# Patient Record
Sex: FEMALE | Race: WHITE | NOT HISPANIC OR LATINO | Employment: FULL TIME | URBAN - METROPOLITAN AREA
[De-identification: names, ages, dates, MRNs, and addresses within clinical notes are randomized per-mention and may not be internally consistent; named-entity substitution may affect disease eponyms.]

---

## 2017-05-07 NOTE — PROGRESS NOTES
SUBJECTIVE:                                                    Joan Lam is a 52 year old female who presents to clinic today for the following health issues:      Depression and Anxiety Follow-Up    Status since last visit: Improved managing better (lifestyle)    Other associated symptoms: None    Complicating factors:     Significant life event: No     Current substance abuse: None    Using trazodone rarely.  Using lorazepam sparingly (Has used about #60 in past 5 months)    PHQ-9 SCORE 6/23/2016 10/17/2016 5/8/2017   Total Score - - -   Total Score 1 2 0     NATALIE-7 SCORE 6/23/2016 10/17/2016 5/8/2017   Total Score - - -   Total Score 0 6 2        PHQ-9  English      PHQ-9   Any Language     GAD7       Amount of exercise or physical activity: 4-5 days/week for an average of 30-45 minutes    Problems taking medications regularly: No    Medication side effects: none    Diet: regular (no restrictions)    Hypertension Follow-up      Outpatient blood pressures are not being checked.    Has gained some weight and not eating well over the winter.    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        Problem list and histories reviewed & adjusted, as indicated.  Additional history: as documented    Patient Active Problem List   Diagnosis     Allergic rhinitis     Mild recurrent major depression (H)     Eczema of eyelid     Anxiety disorder     Essential hypertension with goal blood pressure less than 140/90     Past Surgical History:   Procedure Laterality Date     BUNIONECTOMY RT/LT  2005    Right Foot     BUNIONECTOMY RT/LT  2006    Left Foot        Social History   Substance Use Topics     Smoking status: Never Smoker     Smokeless tobacco: Not on file     Alcohol use Yes      Comment: occ     Family History   Problem Relation Age of Onset     Hypertension Father      Breast Cancer Mother      CEREBROVASCULAR DISEASE Maternal  "Grandmother      Breast Cancer Maternal Grandmother          BP Readings from Last 3 Encounters:   05/08/17 (!) 146/99   12/15/16 126/82   10/17/16 (!) 128/94    Wt Readings from Last 3 Encounters:   05/08/17 200 lb 4 oz (90.8 kg)   12/15/16 199 lb (90.3 kg)   10/17/16 198 lb (89.8 kg)           Reviewed and updated as needed this visit by clinical staff  Tobacco  Allergies  Meds  Problems  Med Hx  Surg Hx  Fam Hx  Soc Hx        Reviewed and updated as needed this visit by Provider  Allergies  Meds  Problems         ROS:  CONST: NEGATIVE for problems with sleep and POSITIVE for weight gain  PSYCH: NEGATIVE for changes in mood    OBJECTIVE:                                                    BP (!) 146/99 (BP Location: Left arm, Patient Position: Chair, Cuff Size: Adult Large)  Pulse 90  Temp 98  F (36.7  C) (Oral)  Ht 5' 4.5\" (1.638 m)  Wt 200 lb 4 oz (90.8 kg)  LMP 04/10/2017  SpO2 98%  Breastfeeding? No  BMI 33.84 kg/m2  Body mass index is 33.84 kg/(m^2).  GEN:  no apparent distress  PSYCH:    Appearance: appropriately and casually dressed    Attitude: cooperative    Speech:  normal rate, rhythm, and tone    Thought Form: organized and logical    Thought Content: no evidence of psychotic thought    Mood: good    Affect: intensity is flat    Insight: good          ASSESSMENT/PLAN:                                                            1. Recurrent major depressive disorder, in full remission (H)  stable/well controlled   - FLUoxetine (PROZAC) 20 MG capsule; Take 2 capsules (40 mg) by mouth daily  Dispense: 180 capsule; Refill: 1    2. NATALIE (generalized anxiety disorder)  stable/well controlled.  Reviewed that lorazepam is a controlled substances and should be used sparingly as she is.    - LORazepam (ATIVAN) 0.5 MG tablet; Take 1 tablet (0.5 mg) by mouth daily as needed  Dispense: 60 tablet; Refill: 0  - FLUoxetine (PROZAC) 20 MG capsule; Take 2 capsules (40 mg) by mouth daily  Dispense: 180 " capsule; Refill: 1    3. Eczema of eyelid, unspecified laterality  Mild and related to seasonal allergy seasons  - desonide (DESOWEN) 0.05 % ointment; Apply topically 2 times daily as needed up to one week at a time.  Dispense: 15 g; Refill: 2    4. Essential hypertension with goal blood pressure less than 140/90  Running high today.  She wants to keep working on diet/exercise and will self-monitor blood pressure.  She will let me know if it persists > 140/90.  We did discuss sequelae/risks of untreated hypertension.      5. Screen for colon cancer  - GASTROENTEROLOGY ADULT REF PROCEDURE ONLY    FUTURE APPOINTMENTS:       - Follow-up visit in 6 months.    Doreen Aguila MD  [facility]

## 2017-05-08 ENCOUNTER — OFFICE VISIT (OUTPATIENT)
Dept: FAMILY MEDICINE | Facility: CLINIC | Age: 52
End: 2017-05-08
Payer: COMMERCIAL

## 2017-05-08 VITALS
WEIGHT: 200.25 LBS | TEMPERATURE: 98 F | DIASTOLIC BLOOD PRESSURE: 99 MMHG | BODY MASS INDEX: 33.36 KG/M2 | HEIGHT: 65 IN | SYSTOLIC BLOOD PRESSURE: 146 MMHG | OXYGEN SATURATION: 98 % | HEART RATE: 90 BPM

## 2017-05-08 DIAGNOSIS — H01.139 ECZEMA OF EYELID, UNSPECIFIED LATERALITY: ICD-10-CM

## 2017-05-08 DIAGNOSIS — F41.1 GAD (GENERALIZED ANXIETY DISORDER): ICD-10-CM

## 2017-05-08 DIAGNOSIS — F33.42 RECURRENT MAJOR DEPRESSIVE DISORDER, IN FULL REMISSION (H): Primary | ICD-10-CM

## 2017-05-08 DIAGNOSIS — Z12.11 SCREEN FOR COLON CANCER: ICD-10-CM

## 2017-05-08 DIAGNOSIS — I10 ESSENTIAL HYPERTENSION WITH GOAL BLOOD PRESSURE LESS THAN 140/90: ICD-10-CM

## 2017-05-08 PROCEDURE — 99214 OFFICE O/P EST MOD 30 MIN: CPT | Performed by: FAMILY MEDICINE

## 2017-05-08 RX ORDER — LORAZEPAM 0.5 MG/1
0.5 TABLET ORAL DAILY PRN
Qty: 60 TABLET | Refills: 0 | Status: SHIPPED | OUTPATIENT
Start: 2017-05-08 | End: 2017-08-03

## 2017-05-08 RX ORDER — DESONIDE 0.5 MG/G
OINTMENT TOPICAL 2 TIMES DAILY PRN
Qty: 15 G | Refills: 2 | Status: SHIPPED | OUTPATIENT
Start: 2017-05-08 | End: 2019-09-02

## 2017-05-08 ASSESSMENT — ANXIETY QUESTIONNAIRES
5. BEING SO RESTLESS THAT IT IS HARD TO SIT STILL: NOT AT ALL
1. FEELING NERVOUS, ANXIOUS, OR ON EDGE: NOT AT ALL
GAD7 TOTAL SCORE: 2
7. FEELING AFRAID AS IF SOMETHING AWFUL MIGHT HAPPEN: NOT AT ALL
3. WORRYING TOO MUCH ABOUT DIFFERENT THINGS: SEVERAL DAYS
2. NOT BEING ABLE TO STOP OR CONTROL WORRYING: NOT AT ALL
IF YOU CHECKED OFF ANY PROBLEMS ON THIS QUESTIONNAIRE, HOW DIFFICULT HAVE THESE PROBLEMS MADE IT FOR YOU TO DO YOUR WORK, TAKE CARE OF THINGS AT HOME, OR GET ALONG WITH OTHER PEOPLE: NOT DIFFICULT AT ALL
6. BECOMING EASILY ANNOYED OR IRRITABLE: NOT AT ALL

## 2017-05-08 ASSESSMENT — PATIENT HEALTH QUESTIONNAIRE - PHQ9: 5. POOR APPETITE OR OVEREATING: SEVERAL DAYS

## 2017-05-08 NOTE — NURSING NOTE
"Chief Complaint   Patient presents with     Depression     Anxiety       Initial BP (!) 146/99 (BP Location: Left arm, Patient Position: Chair, Cuff Size: Adult Large)  Pulse 90  Temp 98  F (36.7  C) (Oral)  Ht 5' 4.5\" (1.638 m)  Wt 200 lb 4 oz (90.8 kg)  LMP 04/10/2017  SpO2 98%  Breastfeeding? No  BMI 33.84 kg/m2 Estimated body mass index is 33.84 kg/(m^2) as calculated from the following:    Height as of this encounter: 5' 4.5\" (1.638 m).    Weight as of this encounter: 200 lb 4 oz (90.8 kg).  Medication Reconciliation: complete     Franci Grove MA      "

## 2017-05-08 NOTE — MR AVS SNAPSHOT
After Visit Summary   5/8/2017    Joan Lam    MRN: [id number]           Patient Information     Date Of Birth          1965        Visit Information        Provider Department      5/8/2017 9:00 AM Doreen Aguila MD [facility]        Today's Diagnoses     Screen for colon cancer    -  1    Eczema of eyelid, unspecified laterality        NATALIE (generalized anxiety disorder)        Recurrent major depressive disorder, in full remission (H)           Follow-ups after your visit        Additional Services     GASTROENTEROLOGY ADULT REF PROCEDURE ONLY       Last Lab Result: Creatinine (mg/dL)       Date                     Value                 10/09/2007               0.72             ----------  Body mass index is 33.84 kg/(m^2).      Patient will be contacted to schedule procedure.     Please be aware that coverage of these services is subject to the terms and limitations of your health insurance plan.  Call member services at your health plan with any benefit or coverage questions.  Any procedures must be performed at a [place] [place] facility OR coordinated by your clinic's referral office.    Please bring the following with you to your appointment:    (1) Any X-Rays, CTs or MRIs which have been performed.  Contact the facility where they were done to arrange for  prior to your scheduled appointment.    (2) List of current medications   (3) This referral request   (4) Any documents/labs given to you for this referral                  Follow-up notes from your care team     Return in about 6 months (around 11/8/2017).      Who to contact     If you have questions or need follow up information about today's clinic visit or your schedule please contact [facility] directly at [phone].  Normal or non-critical lab and imaging results will be communicated to you by MyChart, letter or phone within 4 business days after the clinic has received the results. If you  "do not hear from us within 7 days, please contact the clinic through [employer] or phone. If you have a critical or abnormal lab result, we will notify you by phone as soon as possible.  Submit refill requests through [employer] or call your pharmacy and they will forward the refill request to us. Please allow 3 business days for your refill to be completed.          Additional Information About Your Visit        [employer] Information     [employer] gives you secure access to your electronic health record. If you see a primary care provider, you can also send messages to your care team and make appointments. If you have questions, please call your primary care clinic.  If you do not have a primary care provider, please call [phone] and they will assist you.        Care EveryWhere ID     This is your Care EveryWhere ID. This could be used by other organizations to access your [place] medical records  [id number]        Your Vitals Were     Pulse Temperature Height Last Period Pulse Oximetry Breastfeeding?    90 98  F (36.7  C) (Oral) 5' 4.5\" (1.638 m) 04/10/2017 98% No    BMI (Body Mass Index)                   33.84 kg/m2            Blood Pressure from Last 3 Encounters:   05/08/17 (!) 146/92   12/15/16 126/82   10/17/16 (!) 128/94    Weight from Last 3 Encounters:   05/08/17 200 lb 4 oz (90.8 kg)   12/15/16 199 lb (90.3 kg)   10/17/16 198 lb (89.8 kg)              We Performed the Following     GASTROENTEROLOGY ADULT REF PROCEDURE ONLY          Where to get your medicines      These medications were sent to [facility] [address] AT [address] [address]     Phone:  [phone]     desonide 0.05 % ointment    FLUoxetine 20 MG capsule         Some of these will need a paper prescription and others can be bought over the counter.  Ask your nurse if you have questions.     Bring a paper prescription for each of these medications     LORazepam 0.5 MG " tablet          Primary Care Provider Office Phone # Fax #    Doreen Aguila [phone]       [facility] [address] [facility] [address]        Thank you!     Thank you for choosing [facility]  for your care. Our goal is always to provide you with excellent care. Hearing back from our patients is one way we can continue to improve our services. Please take a few minutes to complete the written survey that you may receive in the mail after your visit with us. Thank you!             Your Updated Medication List - Protect others around you: Learn how to safely use, store and throw away your medicines at www.disposemymeds.org.          This list is accurate as of: 5/8/17  9:25 AM.  Always use your most recent med list.                   Brand Name Dispense Instructions for use    cetirizine 10 MG tablet    zyrTEC     Take 10 mg by mouth daily       desonide 0.05 % ointment    DESOWEN    15 g    Apply topically 2 times daily as needed up to one week at a time.       FLUoxetine 20 MG capsule    PROzac    180 capsule    Take 2 capsules (40 mg) by mouth daily       fluticasone 50 MCG/ACT spray    FLONASE     Spray 2 sprays into both nostrils daily       levothyroxine 100 MCG tablet    SYNTHROID/LEVOTHROID     Take 1 tablet by mouth daily       LORazepam 0.5 MG tablet    ATIVAN    60 tablet    Take 1 tablet (0.5 mg) by mouth daily as needed       progesterone 100 MG capsule    PROMETRIUM     Take 300 mg by mouth At Bedtime       traZODone 100 MG tablet    DESYREL     Take 0.5-1 tablets ( mg) by mouth nightly as needed for sleep       VITAMIN D (CHOLECALCIFEROL) PO      Take 1,000 Units by mouth daily Takes 3 tablets in am

## 2017-05-09 ASSESSMENT — ANXIETY QUESTIONNAIRES: GAD7 TOTAL SCORE: 2

## 2017-05-09 ASSESSMENT — PATIENT HEALTH QUESTIONNAIRE - PHQ9: SUM OF ALL RESPONSES TO PHQ QUESTIONS 1-9: 0

## 2017-05-16 ENCOUNTER — TELEPHONE (OUTPATIENT)
Dept: FAMILY MEDICINE | Facility: CLINIC | Age: 52
End: 2017-05-16

## 2017-05-16 DIAGNOSIS — I10 ESSENTIAL HYPERTENSION WITH GOAL BLOOD PRESSURE LESS THAN 140/90: Primary | ICD-10-CM

## 2017-05-16 RX ORDER — AMLODIPINE BESYLATE 5 MG/1
5 TABLET ORAL DAILY
Qty: 30 TABLET | Refills: 1 | Status: SHIPPED | OUTPATIENT
Start: 2017-05-16 | End: 2017-06-12

## 2017-05-16 NOTE — TELEPHONE ENCOUNTER
The patient saw Dr. Aguila 5/8/17 and they discussed the possibility of starting blood pressure medication.  The patient has now decided that she would like to start taking a blood pressure medication, she uses the [facility] on [address].

## 2017-05-16 NOTE — TELEPHONE ENCOUNTER
5/8/17 ov- 4. Essential hypertension with goal blood pressure less than 140/90  Running high today. She wants to keep working on diet/exercise and will self-monitor blood pressure. She will let me know if it persists > 140/90. We did discuss sequelae/risks of untreated hypertension.     Pharmacy pended.  YOGI Mckenzie

## 2017-05-16 NOTE — TELEPHONE ENCOUNTER
Good choice!  I sent in prescription for amlodipine 5 mg once daily.  She should schedule nurse-only appointment for blood pressure recheck in about 3-4 weeks.  Let us know if she has any side effects: dizziness or ankle swelling.    Doreen Aguila MD

## 2017-06-12 ENCOUNTER — OFFICE VISIT (OUTPATIENT)
Dept: FAMILY MEDICINE | Facility: CLINIC | Age: 52
End: 2017-06-12
Payer: COMMERCIAL

## 2017-06-12 VITALS
SYSTOLIC BLOOD PRESSURE: 124 MMHG | TEMPERATURE: 98.3 F | BODY MASS INDEX: 33.72 KG/M2 | DIASTOLIC BLOOD PRESSURE: 82 MMHG | OXYGEN SATURATION: 98 % | HEART RATE: 73 BPM | WEIGHT: 199.5 LBS | RESPIRATION RATE: 22 BRPM

## 2017-06-12 DIAGNOSIS — I10 ESSENTIAL HYPERTENSION WITH GOAL BLOOD PRESSURE LESS THAN 140/90: Primary | ICD-10-CM

## 2017-06-12 LAB
ALBUMIN UR-MCNC: NEGATIVE MG/DL
APPEARANCE UR: CLEAR
BACTERIA #/AREA URNS HPF: ABNORMAL /HPF
BILIRUB UR QL STRIP: NEGATIVE
COLOR UR AUTO: YELLOW
GLUCOSE UR STRIP-MCNC: NEGATIVE MG/DL
HGB UR QL STRIP: ABNORMAL
KETONES UR STRIP-MCNC: NEGATIVE MG/DL
LEUKOCYTE ESTERASE UR QL STRIP: NEGATIVE
NITRATE UR QL: NEGATIVE
PH UR STRIP: 6 PH (ref 5–7)
RBC #/AREA URNS AUTO: ABNORMAL /HPF (ref 0–2)
SP GR UR STRIP: 1.01 (ref 1–1.03)
URN SPEC COLLECT METH UR: ABNORMAL
UROBILINOGEN UR STRIP-ACNC: 0.2 EU/DL (ref 0.2–1)
WBC #/AREA URNS AUTO: ABNORMAL /HPF (ref 0–2)

## 2017-06-12 PROCEDURE — 36415 COLL VENOUS BLD VENIPUNCTURE: CPT | Performed by: FAMILY MEDICINE

## 2017-06-12 PROCEDURE — 81001 URINALYSIS AUTO W/SCOPE: CPT | Performed by: FAMILY MEDICINE

## 2017-06-12 PROCEDURE — 99213 OFFICE O/P EST LOW 20 MIN: CPT | Performed by: FAMILY MEDICINE

## 2017-06-12 PROCEDURE — 80048 BASIC METABOLIC PNL TOTAL CA: CPT | Performed by: FAMILY MEDICINE

## 2017-06-12 PROCEDURE — 93000 ELECTROCARDIOGRAM COMPLETE: CPT | Performed by: FAMILY MEDICINE

## 2017-06-12 RX ORDER — AMLODIPINE BESYLATE 5 MG/1
5 TABLET ORAL DAILY
Qty: 90 TABLET | Refills: 3 | Status: SHIPPED | OUTPATIENT
Start: 2017-06-12 | End: 2018-06-12

## 2017-06-12 NOTE — NURSING NOTE
"Chief Complaint   Patient presents with     Hypertension       Initial /82  Pulse 73  Temp 98.3  F (36.8  C) (Oral)  Resp 22  Wt 199 lb 8 oz (90.5 kg)  SpO2 98%  BMI 33.72 kg/m2 Estimated body mass index is 33.72 kg/(m^2) as calculated from the following:    Height as of 5/8/17: 5' 4.5\" (1.638 m).    Weight as of this encounter: 199 lb 8 oz (90.5 kg).  Medication Reconciliation: complete   Samantha Bray      "

## 2017-06-12 NOTE — MR AVS SNAPSHOT
After Visit Summary   6/12/2017    Joan Lam    MRN: [id number]           Patient Information     Date Of Birth          1965        Visit Information        Provider Department      6/12/2017 4:40 PM Doreen Aguila MD [facility]        Today's Diagnoses     Essential hypertension with goal blood pressure less than 140/90    -  1      Care Instructions    Medical Supply Stores:    [facility] at [address] [phone]     [facility] at [address], [phone].    [facility], [facility] at [place], [address], [place], [place] [phone]    [facility] at [address], [phone] (Monay-Friday 8-5:30 and Saturday 9-2)                Follow-ups after your visit        Who to contact     If you have questions or need follow up information about today's clinic visit or your schedule please contact [facility] directly at [phone].  Normal or non-critical lab and imaging results will be communicated to you by MyChart, letter or phone within 4 business days after the clinic has received the results. If you do not hear from us within 7 days, please contact the clinic through [employer] or phone. If you have a critical or abnormal lab result, we will notify you by phone as soon as possible.  Submit refill requests through [employer] or call your pharmacy and they will forward the refill request to us. Please allow 3 business days for your refill to be completed.          Additional Information About Your Visit        MyChart Information     [employer] gives you secure access to your electronic health record. If you see a primary care provider, you can also send messages to your care team and make appointments. If you have questions, please call your primary care clinic.  If you do not have a primary care provider, please call [phone] and  they will assist you.        Care EveryWhere ID     This is your Care EveryWhere ID. This could be used by other organizations to access your [place] medical records  [id number]        Your Vitals Were     Pulse Temperature Respirations Pulse Oximetry BMI (Body Mass Index)       73 98.3  F (36.8  C) (Oral) 22 98% 33.72 kg/m2        Blood Pressure from Last 3 Encounters:   06/12/17 124/82   05/08/17 (!) 146/99   12/15/16 126/82    Weight from Last 3 Encounters:   06/12/17 199 lb 8 oz (90.5 kg)   05/08/17 200 lb 4 oz (90.8 kg)   12/15/16 199 lb (90.3 kg)              We Performed the Following     Basic metabolic panel     EKG 12-lead complete w/read - Clinics     UA with Microscopic reflex to Culture          Where to get your medicines      These medications were sent to [facility] [address] [place] [address] AT [address] [address]     Phone:  [phone]     amLODIPine 5 MG tablet          Primary Care Provider Office Phone # Fax #    Doreen Aguila [phone]       [facility] [address]  [facility] [address]        Thank you!     Thank you for choosing [facility]  for your care. Our goal is always to provide you with excellent care. Hearing back from our patients is one way we can continue to improve our services. Please take a few minutes to complete the written survey that you may receive in the mail after your visit with us. Thank you!             Your Updated Medication List - Protect others around you: Learn how to safely use, store and throw away your medicines at www.disposemymeds.org.          This list is accurate as of: 6/12/17  5:05 PM.  Always use your most recent med list.                   Brand Name Dispense Instructions for use    amLODIPine 5 MG tablet    NORVASC    90 tablet    Take 1 tablet (5 mg) by mouth daily       cetirizine 10 MG tablet    zyrTEC     Take 10 mg by mouth daily        desonide 0.05 % ointment    DESOWEN    15 g    Apply topically 2 times daily as needed up to one week at a time.       FLUoxetine 20 MG capsule    PROzac    180 capsule    Take 2 capsules (40 mg) by mouth daily       fluticasone 50 MCG/ACT spray    FLONASE     Spray 2 sprays into both nostrils daily       levothyroxine 100 MCG tablet    SYNTHROID/LEVOTHROID     Take 1 tablet by mouth daily       LORazepam 0.5 MG tablet    ATIVAN    60 tablet    Take 1 tablet (0.5 mg) by mouth daily as needed       progesterone 100 MG capsule    PROMETRIUM     Take 300 mg by mouth At Bedtime       traZODone 100 MG tablet    DESYREL     Take 0.5-1 tablets ( mg) by mouth nightly as needed for sleep       VITAMIN D (CHOLECALCIFEROL) PO      Take 1,000 Units by mouth daily Takes 3 tablets in am

## 2017-06-12 NOTE — PROGRESS NOTES
SUBJECTIVE:                                                    Joan Lam is a 52 year old female who presents to clinic today for the following health issues:      Hypertension Follow-up      Outpatient blood pressures are not being checked.    Low Salt Diet: low salt    She started amlodipine 5 mg daily 3 weeks ago.       Amount of exercise or physical activity: 4-5 days/week for an average of 30-45 minutes    Problems taking medications regularly: No    Medication side effects: none    Diet: regular (no restrictions)          Problem list and histories reviewed & adjusted, as indicated.  Additional history: as documented    Patient Active Problem List   Diagnosis     Allergic rhinitis     Mild recurrent major depression (H)     Eczema of eyelid     Anxiety disorder     Essential hypertension with goal blood pressure less than 140/90     Past Surgical History:   Procedure Laterality Date     BUNIONECTOMY RT/LT  2005    Right Foot     BUNIONECTOMY RT/LT  2006    Left Foot        Social History   Substance Use Topics     Smoking status: Never Smoker     Smokeless tobacco: Not on file     Alcohol use Yes      Comment: occ     Family History   Problem Relation Age of Onset     Hypertension Father      Breast Cancer Mother      CEREBROVASCULAR DISEASE Maternal Grandmother      Breast Cancer Maternal Grandmother          BP Readings from Last 3 Encounters:   06/12/17 124/82   05/08/17 (!) 146/99   12/15/16 126/82    Wt Readings from Last 3 Encounters:   06/12/17 199 lb 8 oz (90.5 kg)   05/08/17 200 lb 4 oz (90.8 kg)   12/15/16 199 lb (90.3 kg)              Reviewed and updated as needed this visit by clinical staff  Tobacco  Allergies  Meds  Med Hx  Surg Hx  Fam Hx  Soc Hx        ROS:  CV: NEGATIVE for swelling    OBJECTIVE:                                                    /82  Pulse 73  Temp 98.3  F (36.8  C) (Oral)  Resp 22  Wt 199 lb 8 oz (90.5 kg)  SpO2 98%  BMI 33.72 kg/m2  Body mass  index is 33.72 kg/(m^2).  GEN:  no apparent distress      Diagnostic Test Results:  EKG: appears normal, NSR, RR' in V1 consistent with incomplete RBBB, normal axis, normal intervals, no acute ST/T changes c/w ischemia, no LVH by voltage criteria    Results for orders placed or performed in visit on 06/12/17   UA with Microscopic reflex to Culture   Result Value Ref Range    Color Urine Yellow     Appearance Urine Clear     Glucose Urine Negative NEG mg/dL    Bilirubin Urine Negative NEG    Ketones Urine Negative NEG mg/dL    Specific Gravity Urine 1.015 1.003 - 1.035    pH Urine 6.0 5.0 - 7.0 pH    Protein Albumin Urine Negative NEG mg/dL    Urobilinogen Urine 0.2 0.2 - 1.0 EU/dL    Nitrite Urine Negative NEG    Blood Urine Trace (A) NEG    Leukocyte Esterase Urine Negative NEG    Source Midstream Urine     WBC Urine O - 2 0 - 2 /HPF    RBC Urine O - 2 0 - 2 /HPF    Bacteria Urine Few (A) NEG /HPF         ASSESSMENT/PLAN:                                                            1. Essential hypertension with goal blood pressure less than 140/90  New diagnosis.  We performed initial workup for new-onset hypertension.  She's doing well on amlodipine and we'll have her continue that.   - EKG 12-lead complete w/read - Clinics  - Basic metabolic panel  - UA with Microscopic reflex to Culture  - amLODIPine (NORVASC) 5 MG tablet; Take 1 tablet (5 mg) by mouth daily  Dispense: 90 tablet; Refill: 3    FUTURE APPOINTMENTS:       - Follow-up visit in 1 year.    Doreen Aguila MD  [facility]

## 2017-06-12 NOTE — PATIENT INSTRUCTIONS
Medical Supply Stores:    [facility] at [address] [place] [address] [phone]     [facility] at [address], [phone].    [facility], [facility], [address], [place], [place] [phone]    [facility] at [address], [phone] (Monay-Friday 8-5:30 and Saturday 9-2)

## 2017-06-14 LAB
ANION GAP SERPL CALCULATED.3IONS-SCNC: 9 MMOL/L (ref 3–14)
BUN SERPL-MCNC: 10 MG/DL (ref 7–30)
CALCIUM SERPL-MCNC: 9 MG/DL (ref 8.5–10.1)
CHLORIDE SERPL-SCNC: 105 MMOL/L (ref 94–109)
CO2 SERPL-SCNC: 24 MMOL/L (ref 20–32)
CREAT SERPL-MCNC: 0.67 MG/DL (ref 0.52–1.04)
GFR SERPL CREATININE-BSD FRML MDRD: NORMAL ML/MIN/1.7M2
GLUCOSE SERPL-MCNC: 94 MG/DL (ref 70–99)
POTASSIUM SERPL-SCNC: 3.8 MMOL/L (ref 3.4–5.3)
SODIUM SERPL-SCNC: 138 MMOL/L (ref 133–144)

## 2017-06-14 NOTE — PROGRESS NOTES
Twan Franco,  Thanks for coming to clinic.  This all looks great as did your EKG.  As we discussed these are all routine, baseline tests for new-onset hypertension (to rule-out other causes or complications of hypertension).     Doreen Aguila MD

## 2017-08-01 NOTE — PROGRESS NOTES
SUBJECTIVE:                                                    Joan Lam is a 52 year old female who presents to clinic today for the following health issues:      Depression and Anxiety Follow-Up    Status since last visit: No change    Other associated symptoms: more stress due to work, a lot of drama there.    Complicating factors:     Significant life event: No     Current substance abuse: None    She continues to take fluoxetine and lorazepam with no problems or noted side effects.  She has used #60 lorazepam over the past 3 months.  She typically uses this in the afternoon or evening and not every day - just PRN.  A lot of co-workers are leaving her company and so she is taking on more work while she too is trying to do a job search.       PHQ-9 SCORE 10/17/2016 5/8/2017 8/3/2017   Total Score - - -   Total Score 2 0 2     NATALIE-7 SCORE 10/17/2016 5/8/2017 8/3/2017   Total Score - - -   Total Score 6 2 2           Amount of exercise or physical activity: None    Problems taking medications regularly: No    Medication side effects: none    Diet: regular (no restrictions)          Problem list and histories reviewed & adjusted, as indicated.  Additional history: as documented    Past Medical History:   Diagnosis Date     Allergic rhinitis 8/11/2005     Anxiety disorder     Has Panic Attacks     History of prolactinoma     Dr. Fischer, [facility]     Mild recurrent major depression (H) 8/11/2005     Unspecified contraceptive management      Patient Active Problem List   Diagnosis     Allergic rhinitis     Mild recurrent major depression (H)     Eczema of eyelid     Anxiety disorder     Essential hypertension with goal blood pressure less than 140/90      BP Readings from Last 3 Encounters:   08/03/17 128/85   06/12/17 124/82   05/08/17 (!) 146/99    Wt Readings from Last 3 Encounters:   08/03/17 204 lb (92.5 kg)   06/12/17 199 lb 8 oz (90.5 kg)   05/08/17 200 lb 4 oz (90.8 kg)            Reviewed  "and updated as needed this visit by clinical staffTobacco  Allergies  Meds  Problems       Reviewed and updated as needed this visit by Provider  Meds  Problems             OBJECTIVE:     /85 (BP Location: Right arm)  Pulse 86  Temp 98.5  F (36.9  C) (Oral)  Resp 12  Ht 5' 4.5\" (1.638 m)  Wt 204 lb (92.5 kg)  LMP 07/14/2017 (Approximate)  SpO2 99%  BMI 34.48 kg/m2  Body mass index is 34.48 kg/(m^2).  GEN:  no apparent distress  PSYCH:    Appearance: appropriately and casually dressed    Attitude: cooperative    Speech:  normal rate, rhythm, and tone    Thought Form: organized    Thought Content: no evidence of psychotic thought    Mood: good    Affect: mood congruent    Insight: good     Diagnostic Test Results:  none     ASSESSMENT/PLAN:       1. NATALIE (generalized anxiety disorder)  Discussed importance of limiting use of benzo.  Discussed risk of tolerance and resulting need for dose escalation if used on a regular basis.  I recommended she try taking 1/2 tab at a time and that she look into other non-medication stress management strategies such as mindfulness, medication, yoga, etc.  I'll continue to monitor her use and encourage her to wean down.  - LORazepam (ATIVAN) 0.5 MG tablet; Take 1 tablet (0.5 mg) by mouth daily as needed  Dispense: 60 tablet; Refill: 0    2. Major depressive disorder, recurrent episode, in full remission (H)  - DEPRESSION ACTION PLAN (DAP)       FUTURE APPOINTMENTS:       - Follow-up visit in 3 months     Doreen Aguila MD  [facility]     "

## 2017-08-03 ENCOUNTER — OFFICE VISIT (OUTPATIENT)
Dept: FAMILY MEDICINE | Facility: CLINIC | Age: 52
End: 2017-08-03
Payer: COMMERCIAL

## 2017-08-03 VITALS
WEIGHT: 204 LBS | RESPIRATION RATE: 12 BRPM | SYSTOLIC BLOOD PRESSURE: 128 MMHG | OXYGEN SATURATION: 99 % | TEMPERATURE: 98.5 F | HEIGHT: 65 IN | BODY MASS INDEX: 33.99 KG/M2 | DIASTOLIC BLOOD PRESSURE: 85 MMHG | HEART RATE: 86 BPM

## 2017-08-03 DIAGNOSIS — F33.42 MAJOR DEPRESSIVE DISORDER, RECURRENT EPISODE, IN FULL REMISSION (H): ICD-10-CM

## 2017-08-03 DIAGNOSIS — F41.1 GAD (GENERALIZED ANXIETY DISORDER): Primary | ICD-10-CM

## 2017-08-03 PROCEDURE — 99213 OFFICE O/P EST LOW 20 MIN: CPT | Performed by: FAMILY MEDICINE

## 2017-08-03 RX ORDER — LORAZEPAM 0.5 MG/1
0.5 TABLET ORAL DAILY PRN
Qty: 60 TABLET | Refills: 0 | Status: SHIPPED | OUTPATIENT
Start: 2017-08-03 | End: 2017-12-13

## 2017-08-03 ASSESSMENT — ANXIETY QUESTIONNAIRES
6. BECOMING EASILY ANNOYED OR IRRITABLE: NOT AT ALL
5. BEING SO RESTLESS THAT IT IS HARD TO SIT STILL: NOT AT ALL
3. WORRYING TOO MUCH ABOUT DIFFERENT THINGS: NOT AT ALL
1. FEELING NERVOUS, ANXIOUS, OR ON EDGE: SEVERAL DAYS
GAD7 TOTAL SCORE: 2
7. FEELING AFRAID AS IF SOMETHING AWFUL MIGHT HAPPEN: NOT AT ALL
IF YOU CHECKED OFF ANY PROBLEMS ON THIS QUESTIONNAIRE, HOW DIFFICULT HAVE THESE PROBLEMS MADE IT FOR YOU TO DO YOUR WORK, TAKE CARE OF THINGS AT HOME, OR GET ALONG WITH OTHER PEOPLE: SOMEWHAT DIFFICULT
2. NOT BEING ABLE TO STOP OR CONTROL WORRYING: NOT AT ALL

## 2017-08-03 ASSESSMENT — PATIENT HEALTH QUESTIONNAIRE - PHQ9: 5. POOR APPETITE OR OVEREATING: SEVERAL DAYS

## 2017-08-03 NOTE — LETTER
My Depression Action Plan  Name: Joan Lam   Date of Birth 1965  Date: 8/3/2017    My doctor: Doreen Aguila   My clinic: [address] [address] [phone]          GREEN    ZONE   Good Control    What it looks like:     Things are going generally well. You have normal up s and down s. You may even feel depressed from time to time, but bad moods usually last less than a day.   What you need to do:  1. Continue to care for yourself (see self care plan)  2. Check your depression survival kit and update it as needed  3. Follow your physician s recommendations including any medication.  4. Do not stop taking medication unless you consult with your physician first.           YELLOW         ZONE Getting Worse    What it looks like:     Depression is starting to interfere with your life.     It may be hard to get out of bed; you may be starting to isolate yourself from others.    Symptoms of depression are starting to last most all day and this has happened for several days.     You may have suicidal thoughts but they are not constant.   What you need to do:     1. Call your care team, your response to treatment will improve if you keep your care team informed of your progress. Yellow periods are signs an adjustment may need to be made.     2. Continue your self-care, even if you have to fake it!    3. Talk to someone in your support network    4. Open up your depression survival kit           RED    ZONE Medical Alert - Get Help    What it looks like:     Depression is seriously interfering with your life.     You may experience these or other symptoms: You can t get out of bed most days, can t work or engage in other necessary activities, you have trouble taking care of basic hygiene, or basic responsibilities, thoughts of suicide or death that will not go away, self-injurious behavior.     What you need to do:  1. Call your care team and  request a same-day appointment. If they are not available (weekends or after hours) call your local crisis line, emergency room or 911.      Electronically signed by: Patricia Tamayo, August 3, 2017    Depression Self Care Plan / Survival Kit    Self-Care for Depression  Here s the deal. Your body and mind are really not as separate as most people think.  What you do and think affects how you feel and how you feel influences what you do and think. This means if you do things that people who feel good do, it will help you feel better.  Sometimes this is all it takes.  There is also a place for medication and therapy depending on how severe your depression is, so be sure to consult with your medical provider and/ or Behavioral Health Consultant if your symptoms are worsening or not improving.     In order to better manage my stress, I will:    Exercise  Get some form of exercise, every day. This will help reduce pain and release endorphins, the  feel good  chemicals in your brain. This is almost as good as taking antidepressants!  This is not the same as joining a gym and then never going! (they count on that by the way ) It can be as simple as just going for a walk or doing some gardening, anything that will get you moving.      Hygiene   Maintain good hygiene (Get out of bed in the morning, Make your bed, Brush your teeth, Take a shower, and Get dressed like you were going to work, even if you are unemployed).  If your clothes don't fit try to get ones that do.    Diet  I will strive to eat foods that are good for me, drink plenty of water, and avoid excessive sugar, caffeine, alcohol, and other mood-altering substances.  Some foods that are helpful in depression are: complex carbohydrates, B vitamins, flaxseed, fish or fish oil, fresh fruits and vegetables.    Psychotherapy  I agree to participate in Individual Therapy (if recommended).    Medication  If prescribed medications, I agree to take them.  Missing doses can  result in serious side effects.  I understand that drinking alcohol, or other illicit drug use, may cause potential side effects.  I will not stop my medication abruptly without first discussing it with my provider.    Staying Connected With Others  I will stay in touch with my friends, family members, and my primary care provider/team.    Use your imagination  Be creative.  We all have a creative side; it doesn t matter if it s oil painting, sand castles, or mud pies! This will also kick up the endorphins.    Witness Beauty  (AKA stop and smell the roses) Take a look outside, even in mid-winter. Notice colors, textures. Watch the squirrels and birds.     Service to others  Be of service to others.  There is always someone else in need.  By helping others we can  get out of ourselves  and remember the really important things.  This also provides opportunities for practicing all the other parts of the program.    Humor  Laugh and be silly!  Adjust your TV habits for less news and crime-drama and more comedy.    Control your stress  Try breathing deep, massage therapy, biofeedback, and meditation. Find time to relax each day.     My support system    Clinic Contact:  Phone number:    Contact 1:  Phone number:    Contact 2:  Phone number:    Latter day/:  Phone number:    Therapist:  Phone number:    Local crisis center:    Phone number:    Other community support:  Phone number:

## 2017-08-03 NOTE — MR AVS SNAPSHOT
"              After Visit Summary   8/3/2017    Joan Lam    MRN: [id number]           Patient Information     Date Of Birth          1965        Visit Information        Provider Department      8/3/2017 11:40 AM Doreen Aguila MD [facility]        Today's Diagnoses     Mild recurrent major depression (H)    -  1    NATALIE (generalized anxiety disorder)           Follow-ups after your visit        Who to contact     If you have questions or need follow up information about today's clinic visit or your schedule please contact [facility] directly at [phone].  Normal or non-critical lab and imaging results will be communicated to you by MyChart, letter or phone within 4 business days after the clinic has received the results. If you do not hear from us within 7 days, please contact the clinic through [employer] or phone. If you have a critical or abnormal lab result, we will notify you by phone as soon as possible.  Submit refill requests through [employer] or call your pharmacy and they will forward the refill request to us. Please allow 3 business days for your refill to be completed.          Additional Information About Your Visit        MyChart Information     [employer] gives you secure access to your electronic health record. If you see a primary care provider, you can also send messages to your care team and make appointments. If you have questions, please call your primary care clinic.  If you do not have a primary care provider, please call [phone] and they will assist you.        Care EveryWhere ID     This is your Care EveryWhere ID. This could be used by other organizations to access your [place] medical records  [id number]        Your Vitals Were     Pulse Temperature Respirations Height Last Period Pulse Oximetry    86 98.5  F (36.9  C) (Oral) 12 5' 4.5\" (1.638 m) 07/14/2017 (Approximate) 99%    BMI (Body Mass Index)                   34.48 kg/m2            " Blood Pressure from Last 3 Encounters:   08/03/17 128/85   06/12/17 124/82   05/08/17 (!) 146/99    Weight from Last 3 Encounters:   08/03/17 204 lb (92.5 kg)   06/12/17 199 lb 8 oz (90.5 kg)   05/08/17 200 lb 4 oz (90.8 kg)              We Performed the Following     DEPRESSION ACTION PLAN (DAP)          Where to get your medicines      Some of these will need a paper prescription and others can be bought over the counter.  Ask your nurse if you have questions.     Bring a paper prescription for each of these medications     LORazepam 0.5 MG tablet          Primary Care Provider Office Phone # Fax #    Doreen Aguila [phone]       [facility] [address]  [facility] [address]        Equal Access to Services     LAURA JACOBSON : Meaghan Colunga, waaxshiela rider, qaybta kaalmashiela portillo, shital graham . So [facility] [place] [phone].    ATENCIÓN: Si habla español, tiene a pérez disposición servicios gratuitos de asistencia lingüística. Ervin al [phone].    We comply with applicable federal civil rights laws and [place] laws. We do not discriminate on the basis of race, color, national origin, age, disability sex, sexual orientation or gender identity.            Thank you!     Thank you for choosing [facility]  for your care. Our goal is always to provide you with excellent care. Hearing back from our patients is one way we can continue to improve our services. Please take a few minutes to complete the written survey that you may receive in the mail after your visit with us. Thank you!             Your Updated Medication List - Protect others around you: Learn how to safely use, store and throw away your medicines at www.disposemymeds.org.          This list is accurate as of: 8/3/17 12:12 PM.  Always use your most recent med list.                   Brand Name Dispense Instructions for use Diagnosis    amLODIPine 5 MG tablet     NORVASC    90 tablet    Take 1 tablet (5 mg) by mouth daily    Essential hypertension with goal blood pressure less than 140/90       cetirizine 10 MG tablet    zyrTEC     Take 10 mg by mouth daily        desonide 0.05 % ointment    DESOWEN    15 g    Apply topically 2 times daily as needed up to one week at a time.    Eczema of eyelid, unspecified laterality       FLUoxetine 20 MG capsule    PROzac    180 capsule    Take 2 capsules (40 mg) by mouth daily    NATALIE (generalized anxiety disorder), Recurrent major depressive disorder, in full remission (H)       fluticasone 50 MCG/ACT spray    FLONASE     Spray 2 sprays into both nostrils daily        levothyroxine 100 MCG tablet    SYNTHROID/LEVOTHROID     Take 1 tablet by mouth daily        LORazepam 0.5 MG tablet    ATIVAN    60 tablet    Take 1 tablet (0.5 mg) by mouth daily as needed    NATALIE (generalized anxiety disorder)       progesterone 100 MG capsule    PROMETRIUM     Take 300 mg by mouth At Bedtime        traZODone 100 MG tablet    DESYREL     Take 0.5-1 tablets ( mg) by mouth nightly as needed for sleep    Insomnia, unspecified type       VITAMIN D (CHOLECALCIFEROL) PO      Take 1,000 Units by mouth daily Takes 3 tablets in am

## 2017-08-03 NOTE — NURSING NOTE
"Chief Complaint   Patient presents with     Anxiety     Depression       Initial /85 (BP Location: Right arm)  Pulse 86  Temp 98.5  F (36.9  C) (Oral)  Resp 12  Ht 5' 4.5\" (1.638 m)  Wt 204 lb (92.5 kg)  LMP 07/14/2017 (Approximate)  SpO2 99%  BMI 34.48 kg/m2 Estimated body mass index is 34.48 kg/(m^2) as calculated from the following:    Height as of this encounter: 5' 4.5\" (1.638 m).    Weight as of this encounter: 204 lb (92.5 kg).  Medication Reconciliation: complete     Patricia Tamayo CMA       "

## 2017-08-04 ASSESSMENT — PATIENT HEALTH QUESTIONNAIRE - PHQ9: SUM OF ALL RESPONSES TO PHQ QUESTIONS 1-9: 2

## 2017-08-04 ASSESSMENT — ANXIETY QUESTIONNAIRES: GAD7 TOTAL SCORE: 2

## 2017-11-02 DIAGNOSIS — F41.1 GAD (GENERALIZED ANXIETY DISORDER): ICD-10-CM

## 2017-11-02 DIAGNOSIS — F33.42 RECURRENT MAJOR DEPRESSIVE DISORDER, IN FULL REMISSION (H): ICD-10-CM

## 2017-12-10 NOTE — PROGRESS NOTES
SUBJECTIVE:  Joan Lam, a 52 year old female, is here to discuss the following issues:     HYPERTENSION FOLLOW-UP    Current medication regimen includes amlodipine 5 mg.   Patient reports no problems or side effects with the medication.  Headaches:  No  Dizziness: No  Patient does have blood pressure checked at dentist and other specialty appointments and it has been 120's/80's.       DEPRESSION AND ANXIETY FOLLOW-UP   She continues to take fluoxetine 40 mg daily and lorazepam 0.5 mg prn with no noted side effects.  She has a controlled substance agreement for #60 lorazepam every 3 months but has not needed refill for 4.5 months.  She sometimes uses just half tab and finds that is sometimes effective.    She has also used trazodone prn for sleep.  When I saw her 4 months ago she noted increased stress at work and was starting to do a job search.  Dog .    Sleep:  Some trouble falling asleep.  Finds if she takes her prometrium earlier in the evening.      Alcohol:  Less than 1 beverage / week  Exercise:  Minimal - did get a portable step machine and has tried using it in her living room.  Also does yoga.      PHQ-9 SCORE 2017 8/3/2017 2017   Total Score - - -   Total Score 0 2 9     No flowsheet data found.  NATALIE-7 SCORE 2017 8/3/2017 2017   Total Score - - -   Total Score 2 2 8           Problem list and histories reviewed & updated, as indicated.  Patient Active Problem List   Diagnosis     Allergic rhinitis     Mild recurrent major depression (H)     Eczema of eyelid     Anxiety disorder     Essential hypertension with goal blood pressure less than 140/90       BP Readings from Last 3 Encounters:   17 138/89   17 128/85   17 124/82    Wt Readings from Last 3 Encounters:   17 200 lb (90.7 kg)   17 204 lb (92.5 kg)   17 199 lb 8 oz (90.5 kg)          ROS:  RESP: NEGATIVE for shortness of breath  CV: NEGATIVE for chest pain        OBJECTIVE:    BP  138/89 (BP Location: Right arm)  Pulse 68  Temp 98.1  F (36.7  C) (Oral)  Resp 12  Wt 200 lb (90.7 kg)  LMP 11/15/2017  SpO2 99%  BMI 33.8 kg/m2  GEN:  no apparent distress  LUNGS:  normal respiratory effort, and lungs clear to auscultation bilaterally - no rales, rhonchi or wheezes  CV: regular rate and rhythm, normal S1 S2, no S3 or S4 and no murmur, click or rub         ASSESSMENT/PLAN:  1. Recurrent major depressive disorder, in partial remission (H)  2. NATALIE (generalized anxiety disorder)  Worsening control due, she feels, in large part to work stress and season.  Discussed option of changing medication or increasing fluoxetine dose but she'd like to continue with current regimen for now.  Reviewed importance of exercise to help improve mood and sleep.    - FLUoxetine (PROZAC) 20 MG capsule; Take 2 capsules (40 mg) by mouth daily  Dispense: 180 capsule; Refill: 1  - LORazepam (ATIVAN) 0.5 MG tablet; Take 1 tablet (0.5 mg) by mouth daily as needed  Dispense: 60 tablet; Refill: 0    3. Essential hypertension with goal blood pressure less than 140/90  Borderline high today but overall adequately controlled.  Continue amlodipine at same dose.         Reminded patient to schedule colonoscopy.  Return to Clinic in 3 months - or when next needing lorazepam renewed.       Doreen Aguila MD   [facility]

## 2017-12-13 ENCOUNTER — OFFICE VISIT (OUTPATIENT)
Dept: FAMILY MEDICINE | Facility: CLINIC | Age: 52
End: 2017-12-13
Payer: COMMERCIAL

## 2017-12-13 VITALS
TEMPERATURE: 98.1 F | BODY MASS INDEX: 33.8 KG/M2 | SYSTOLIC BLOOD PRESSURE: 138 MMHG | OXYGEN SATURATION: 99 % | DIASTOLIC BLOOD PRESSURE: 89 MMHG | HEART RATE: 68 BPM | RESPIRATION RATE: 12 BRPM | WEIGHT: 200 LBS

## 2017-12-13 DIAGNOSIS — I10 ESSENTIAL HYPERTENSION WITH GOAL BLOOD PRESSURE LESS THAN 140/90: ICD-10-CM

## 2017-12-13 DIAGNOSIS — F33.41 RECURRENT MAJOR DEPRESSIVE DISORDER, IN PARTIAL REMISSION (H): Primary | ICD-10-CM

## 2017-12-13 DIAGNOSIS — F41.1 GAD (GENERALIZED ANXIETY DISORDER): ICD-10-CM

## 2017-12-13 PROCEDURE — 99214 OFFICE O/P EST MOD 30 MIN: CPT | Performed by: FAMILY MEDICINE

## 2017-12-13 RX ORDER — LORAZEPAM 0.5 MG/1
0.5 TABLET ORAL DAILY PRN
Qty: 60 TABLET | Refills: 0 | Status: SHIPPED | OUTPATIENT
Start: 2017-12-13 | End: 2018-07-26

## 2017-12-13 ASSESSMENT — ANXIETY QUESTIONNAIRES
2. NOT BEING ABLE TO STOP OR CONTROL WORRYING: SEVERAL DAYS
GAD7 TOTAL SCORE: 8
1. FEELING NERVOUS, ANXIOUS, OR ON EDGE: MORE THAN HALF THE DAYS
7. FEELING AFRAID AS IF SOMETHING AWFUL MIGHT HAPPEN: NOT AT ALL
IF YOU CHECKED OFF ANY PROBLEMS ON THIS QUESTIONNAIRE, HOW DIFFICULT HAVE THESE PROBLEMS MADE IT FOR YOU TO DO YOUR WORK, TAKE CARE OF THINGS AT HOME, OR GET ALONG WITH OTHER PEOPLE: SOMEWHAT DIFFICULT
3. WORRYING TOO MUCH ABOUT DIFFERENT THINGS: SEVERAL DAYS
6. BECOMING EASILY ANNOYED OR IRRITABLE: SEVERAL DAYS
5. BEING SO RESTLESS THAT IT IS HARD TO SIT STILL: NOT AT ALL

## 2017-12-13 ASSESSMENT — PATIENT HEALTH QUESTIONNAIRE - PHQ9
5. POOR APPETITE OR OVEREATING: NEARLY EVERY DAY
SUM OF ALL RESPONSES TO PHQ QUESTIONS 1-9: 9

## 2017-12-13 NOTE — NURSING NOTE
"Chief Complaint   Patient presents with     Recheck Medication     Anxiety     Depression       Initial /89 (BP Location: Right arm)  Pulse 68  Temp 98.1  F (36.7  C) (Oral)  Resp 12  Wt 200 lb (90.7 kg)  LMP 11/15/2017  SpO2 99%  BMI 33.8 kg/m2 Estimated body mass index is 33.8 kg/(m^2) as calculated from the following:    Height as of 8/3/17: 5' 4.5\" (1.638 m).    Weight as of this encounter: 200 lb (90.7 kg).  Medication Reconciliation: complete     Patricia Tamayo CMA      "

## 2017-12-13 NOTE — MR AVS SNAPSHOT
After Visit Summary   12/13/2017    Joan Lam    MRN: [id number]           Patient Information     Date Of Birth          1965        Visit Information        Provider Department      12/13/2017 8:40 AM Doreen Aguila MD [facility]        Today's Diagnoses     Screen for colon cancer        Need for prophylactic vaccination and inoculation against influenza        NATALIE (generalized anxiety disorder)        Recurrent major depressive disorder, in full remission (H)           Follow-ups after your visit        Follow-up notes from your care team     Return in about 3 months (around 3/13/2018).      Who to contact     If you have questions or need follow up information about today's clinic visit or your schedule please contact [facility] directly at [phone].  Normal or non-critical lab and imaging results will be communicated to you by MyChart, letter or phone within 4 business days after the clinic has received the results. If you do not hear from us within 7 days, please contact the clinic through [employer] or phone. If you have a critical or abnormal lab result, we will notify you by phone as soon as possible.  Submit refill requests through MyPrint[employer] or call your pharmacy and they will forward the refill request to us. Please allow 3 business days for your refill to be completed.          Additional Information About Your Visit        MyChart Information     MyPrintCloud gives you secure access to your electronic health record. If you see a primary care provider, you can also send messages to your care team and make appointments. If you have questions, please call your primary care clinic.  If you do not have a primary care provider, please call [phone] and they will assist you.        Care EveryWhere ID     This is your Care EveryWhere ID. This could be used by other organizations to access your [place] medical records  [id number]        Your Vitals Were      Pulse Temperature Respirations Last Period Pulse Oximetry BMI (Body Mass Index)    68 98.1  F (36.7  C) (Oral) 12 11/15/2017 99% 33.8 kg/m2       Blood Pressure from Last 3 Encounters:   12/13/17 138/89   08/03/17 128/85   06/12/17 124/82    Weight from Last 3 Encounters:   12/13/17 200 lb (90.7 kg)   08/03/17 204 lb (92.5 kg)   06/12/17 199 lb 8 oz (90.5 kg)              Today, you had the following     No orders found for display         Today's Medication Changes          These changes are accurate as of: 12/13/17  9:08 AM.  If you have any questions, ask your nurse or doctor.               These medicines have changed or have updated prescriptions.        Dose/Directions    FLUoxetine 20 MG capsule   Commonly known as:  PROzac   This may have changed:  See the new instructions.   Used for:  NATALIE (generalized anxiety disorder), Recurrent major depressive disorder, in full remission (H)   Changed by:  Doreen Aguila MD        Dose:  40 mg   Take 2 capsules (40 mg) by mouth daily   Quantity:  180 capsule   Refills:  1            Where to get your medicines      These medications were sent to [facility] [address] [place] [address] AT [address] [address]     Phone:  [phone]     FLUoxetine 20 MG capsule         Some of these will need a paper prescription and others can be bought over the counter.  Ask your nurse if you have questions.     Bring a paper prescription for each of these medications     LORazepam 0.5 MG tablet                Primary Care Provider Office Phone # Fax #    Doreen Aguila [phone] [address]  [facility] [place] [address]        Equal Access to Services     RAMSES JACOBSON AH: Meaghan Colunga, waconstance luqadaha, qaybta kaalmada adeegyada, shital hooks. So [facility] [phone].    ATENCIÓN: Si habla español, tiene a pérez disposición servicios gratuitos de asistencia  lingüísticaChester Mueller al [phone].    We comply with applicable federal civil rights laws and [place] laws. We do not discriminate on the basis of race, color, national origin, age, disability, sex, sexual orientation, or gender identity.            Thank you!     Thank you for choosing [facility][note 3]  for your care. Our goal is always to provide you with excellent care. Hearing back from our patients is one way we can continue to improve our services. Please take a few minutes to complete the written survey that you may receive in the mail after your visit with us. Thank you!             Your Updated Medication List - Protect others around you: Learn how to safely use, store and throw away your medicines at www.disposemymeds.org.          This list is accurate as of: 12/13/17  9:08 AM.  Always use your most recent med list.                   Brand Name Dispense Instructions for use Diagnosis    amLODIPine 5 MG tablet    NORVASC    90 tablet    Take 1 tablet (5 mg) by mouth daily    Essential hypertension with goal blood pressure less than 140/90       cetirizine 10 MG tablet    zyrTEC     Take 10 mg by mouth daily        desonide 0.05 % ointment    DESOWEN    15 g    Apply topically 2 times daily as needed up to one week at a time.    Eczema of eyelid, unspecified laterality       FLUoxetine 20 MG capsule    PROzac    180 capsule    Take 2 capsules (40 mg) by mouth daily    NATALIE (generalized anxiety disorder), Recurrent major depressive disorder, in full remission (H)       fluticasone 50 MCG/ACT spray    FLONASE     Spray 2 sprays into both nostrils daily        levothyroxine 100 MCG tablet    SYNTHROID/LEVOTHROID     Take 1 tablet by mouth daily        LORazepam 0.5 MG tablet    ATIVAN    60 tablet    Take 1 tablet (0.5 mg) by mouth daily as needed    NATALIE (generalized anxiety disorder)       progesterone 100 MG capsule    PROMETRIUM     Take 300 mg by mouth At Bedtime        traZODone 100 MG tablet     DESYREL     Take 0.5-1 tablets ( mg) by mouth nightly as needed for sleep    Insomnia, unspecified type       * VITAMIN D (CHOLECALCIFEROL) PO      Take 1,000 Units by mouth daily Takes 3 tablets in am        * cholecalciferol 1000 UNIT tablet    vitamin D3     Take 3 tablets (3,000 Units) by mouth daily        * Notice:  This list has 2 medication(s) that are the same as other medications prescribed for you. Read the directions carefully, and ask your doctor or other care provider to review them with you.

## 2017-12-14 ASSESSMENT — ANXIETY QUESTIONNAIRES: GAD7 TOTAL SCORE: 8

## 2017-12-28 ENCOUNTER — MYC MEDICAL ADVICE (OUTPATIENT)
Dept: FAMILY MEDICINE | Facility: CLINIC | Age: 52
End: 2017-12-28

## 2017-12-28 DIAGNOSIS — D35.2 PROLACTINOMA (H): Primary | ICD-10-CM

## 2017-12-29 ENCOUNTER — TELEPHONE (OUTPATIENT)
Dept: ENDOCRINOLOGY | Facility: CLINIC | Age: 52
End: 2017-12-29

## 2017-12-29 NOTE — TELEPHONE ENCOUNTER
To schedulers: Please schedule her for lst available endocrine. Preferred provider Kristian.    Amanda Meza MD  Endocrine triage

## 2017-12-29 NOTE — TELEPHONE ENCOUNTER
----- Message from Daniela Cast sent at 12/29/2017  9:52 AM CST -----  Regarding: New Pt  Contact: [phone]  New pt referred by CAITLYN BAUTISTA.    Please call the pt at [phone]    Julio Suarez    Please DO NOT send this message and/or reply back to sender.  Call Center Representatives DO NOT respond to messages.

## 2018-01-08 ENCOUNTER — TELEPHONE (OUTPATIENT)
Dept: GASTROENTEROLOGY | Facility: OUTPATIENT CENTER | Age: 53
End: 2018-01-08

## 2018-01-09 ENCOUNTER — TELEPHONE (OUTPATIENT)
Dept: GASTROENTEROLOGY | Facility: OUTPATIENT CENTER | Age: 53
End: 2018-01-09

## 2018-01-09 NOTE — TELEPHONE ENCOUNTER
Patient taking any blood thinners ? No    Heart disease ? denies    Lung disease ? denies      Sleep apnea ? denies    Diabetic ? denies    Kidney disease ? denies    Dialysis ? n/a    Electronic implanted medical devices ?    Are you taking any narcotic pain medication ? no  What is your daily dosage ?    PTSD ? n/a    Prep instructions reviewed with patient ? Patient declined review.  policy, MAC sedation plan reviewed. Advised patient to have someone stay with her post exam    Pharmacy : n/a    Indication for procedure : Screen for colon cancer [Z12.11    Referring provider :Doreen Aguila MD     Arrival Time : instructed patient to arrive at 8:45 AM

## 2018-01-15 ENCOUNTER — TRANSFERRED RECORDS (OUTPATIENT)
Dept: HEALTH INFORMATION MANAGEMENT | Facility: CLINIC | Age: 53
End: 2018-01-15

## 2018-01-15 ENCOUNTER — DOCUMENTATION ONLY (OUTPATIENT)
Dept: GASTROENTEROLOGY | Facility: OUTPATIENT CENTER | Age: 53
End: 2018-01-15
Payer: COMMERCIAL

## 2018-01-17 LAB — COPATH REPORT: NORMAL

## 2018-04-09 ASSESSMENT — ENCOUNTER SYMPTOMS
DECREASED LIBIDO: 1
DIARRHEA: 0
BREAST MASS: 0
NAUSEA: 0
RECTAL PAIN: 0
VOMITING: 0
HOT FLASHES: 1
ABDOMINAL PAIN: 0
CONSTIPATION: 0
BOWEL INCONTINENCE: 0
HEARTBURN: 1
BLOATING: 0
BREAST PAIN: 1
BLOOD IN STOOL: 0
JAUNDICE: 0

## 2018-04-10 ENCOUNTER — OFFICE VISIT (OUTPATIENT)
Dept: ENDOCRINOLOGY | Facility: CLINIC | Age: 53
End: 2018-04-10

## 2018-04-10 VITALS
WEIGHT: 202.2 LBS | BODY MASS INDEX: 33.69 KG/M2 | DIASTOLIC BLOOD PRESSURE: 88 MMHG | SYSTOLIC BLOOD PRESSURE: 137 MMHG | HEIGHT: 65 IN | HEART RATE: 76 BPM

## 2018-04-10 DIAGNOSIS — D49.7 PITUITARY TUMOR: ICD-10-CM

## 2018-04-10 DIAGNOSIS — D35.2 PROLACTINOMA (H): ICD-10-CM

## 2018-04-10 DIAGNOSIS — E03.9 HYPOTHYROIDISM, UNSPECIFIED TYPE: ICD-10-CM

## 2018-04-10 DIAGNOSIS — D49.7 PITUITARY TUMOR: Primary | ICD-10-CM

## 2018-04-10 LAB
ALBUMIN SERPL-MCNC: 4 G/DL (ref 3.4–5)
ALP SERPL-CCNC: 112 U/L (ref 40–150)
ALT SERPL W P-5'-P-CCNC: 26 U/L (ref 0–50)
ANION GAP SERPL CALCULATED.3IONS-SCNC: 6 MMOL/L (ref 3–14)
AST SERPL W P-5'-P-CCNC: 14 U/L (ref 0–45)
BILIRUB SERPL-MCNC: 0.2 MG/DL (ref 0.2–1.3)
BUN SERPL-MCNC: 9 MG/DL (ref 7–30)
CALCIUM SERPL-MCNC: 8.9 MG/DL (ref 8.5–10.1)
CHLORIDE SERPL-SCNC: 103 MMOL/L (ref 94–109)
CO2 SERPL-SCNC: 29 MMOL/L (ref 20–32)
CREAT SERPL-MCNC: 0.69 MG/DL (ref 0.52–1.04)
GFR SERPL CREATININE-BSD FRML MDRD: 89 ML/MIN/1.7M2
GLUCOSE SERPL-MCNC: 89 MG/DL (ref 70–99)
POTASSIUM SERPL-SCNC: 3.4 MMOL/L (ref 3.4–5.3)
PROLACTIN SERPL-MCNC: 67 UG/L (ref 3–27)
PROT SERPL-MCNC: 7.9 G/DL (ref 6.8–8.8)
SODIUM SERPL-SCNC: 138 MMOL/L (ref 133–144)
T4 FREE SERPL-MCNC: 1.1 NG/DL (ref 0.76–1.46)
TSH SERPL DL<=0.005 MIU/L-ACNC: 0.97 MU/L (ref 0.4–4)

## 2018-04-10 NOTE — LETTER
4/10/2018       RE: Joan Lam  [address] [place] [facility] [address]     Dear Colleague,    Thank you for referring your patient, Joan Lam, to the [facility] ENDOCRINOLOGY at [facility]. Please see a copy of my visit note below.    - Endocrinology Initial Consultation -    Reason for visit/consult: Elevated prolactin level, remote history of a pituitary microadenoma    Primary care provider: Doreen Aguila    HPI: A 52yo female here for evaluation of her elevated prolactin level.  Different from her primary care physician.     She has remote history of micro prolactinoma 12 years ago age 40, when she experienced galactorrhea and amenorrhea.  At that time her prolactin level was around 60-70s, she also underwent brain MRI which showed microadenoma per patient.  At the time she never tried any medical treatment and prolactin level spontaneously decreased and she stopped visiting endocrinologist at that time.   Meanwhile she was on progesterone p.o. for her hot flashes range from 100-300 mg daily.     She has been followed prolactin level approximately once a year at her primary care physician's office.  In 2015 her prolactin level was 42, however in November 2017 her prolactin level increased to 96.1, with a symptom of the breast tenderness, without the galactorrhea.     HA: no , Dizziness: no, knausea: no, fatigue: no, braest tendernss: for the 2 weeks, bilateral,   No galactorrhea, No hair loss, gainign weight: 5 lb past 1 year  Nirsutism: no, no Acrnes, no DM, mild HTN with norvasc, no change of shoes size      LMP: after acupuncture since 2008. Age 43. Prozac 40 mg daily, Ativan 0.5 mg twice a week    Other medical history includes, anxiety, depression and hypothryoidism for 4 years and taking levothryoixne 100 mcg, stable dose for a while.       Past Medical/Surgical History:  Past Medical History:   Diagnosis Date     Allergic rhinitis 8/11/2005      "Anxiety disorder     Has Panic Attacks     History of prolactinoma     Dr. Fischer, [facility]     Mild recurrent major depression (H) 8/11/2005     Unspecified contraceptive management      Past Surgical History:   Procedure Laterality Date     BUNIONECTOMY RT/LT  2005    Right Foot     BUNIONECTOMY RT/LT  2006    Left Foot        Allergies:  Allergies   Allergen Reactions     No Known Drug Allergies        Current Medications   Current Outpatient Prescriptions   Medication     cholecalciferol (VITAMIN D3) 1000 UNIT tablet     FLUoxetine (PROZAC) 20 MG capsule     LORazepam (ATIVAN) 0.5 MG tablet     amLODIPine (NORVASC) 5 MG tablet     desonide (DESOWEN) 0.05 % ointment     traZODone (DESYREL) 100 MG tablet     levothyroxine (SYNTHROID,LEVOTHROID) 100 MCG tablet     progesterone (PROMETRIUM) 100 MG capsule     fluticasone (FLONASE) 50 MCG/ACT nasal spray     cetirizine (ZYRTEC) 10 MG tablet     No current facility-administered medications for this visit.        Family History:  Family History   Problem Relation Age of Onset     Hypertension Father      Breast Cancer Mother      CEREBROVASCULAR DISEASE Maternal Grandmother      Breast Cancer Maternal Grandmother    no family history of pituitary disease, no family history of thyroid dz, no family history of early menopause.     Social History:  Social History   Substance Use Topics     Smoking status: Never Smoker     Smokeless tobacco: Not on file     Alcohol use Yes      Comment: occ   Lives with room mate. Single     ROS:  Full review of systems taken with the help of the intake sheet. Otherwise a complete 14 point review of systems was taken and is negative unless stated in the history above.      Physical Exam:   Blood pressure 137/88, pulse 76, height 1.645 m (5' 4.75\"), weight 91.7 kg (202 lb 3.2 oz), not currently breastfeeding.    General: well appearing, no acute distress, pleasant and conversant,   Mental Status/neuro: alert and oriented  Face: " symmetrical, normal facial color  Eyes: anicteric, PERRL, no proptosis or lid lag  Visual Field: intact  Occular motor: full  Facial skin: no acnes and no hirsutism  Neck: suppler, no lymphadenopahty  Thyroid: normal size and texture, no nodule palpable, no bruits  Breast: no galactorrhea, very mild tenderness on the nipple bilateral  Heart: regular rhythm, S1S2, no murmur appreciated  Lung: clear to auscultation bilaterally  Abdomen: soft, NT/ND, no hepatomegaly, no striea  Legs: no swelling or edema        Labs : I reviewed data from epic and extract and summarize the pertinent data here.     11/2017 [facility];   PRL 96.1 (4.8-23.3)  TSH 1.141  Free T4 0.99  Vitamin D 24.86  WBC 7.7, hemoglobin 13.9, platelet 290  Lab Results   Component Value Date     06/12/2017      Lab Results   Component Value Date    POTASSIUM 3.8 06/12/2017     Lab Results   Component Value Date    CHLORIDE 105 06/12/2017     Lab Results   Component Value Date    ESTELLA 9.0 06/12/2017     Lab Results   Component Value Date    CO2 24 06/12/2017     Lab Results   Component Value Date    BUN 10 06/12/2017     Lab Results   Component Value Date    CR 0.67 06/12/2017     Lab Results   Component Value Date    GLC 94 06/12/2017 7/2015: PRL 42 (1.9-25),     MRI Brain: Last MRI in 2008 per patient but no report available.    No results found for this or any previous visit.      Assessment and Plan  53 year old female with elevated prolactin level remote history of a microadenoma,     # Elevated Prolactin levels  Patient has a history of galactorrhea and amenorrhea.  And currently has breast tenderness.   Also has remote history of microadenoma, prolactinoma possibly   We will check prolactin, IGF-I, CMP today.   - PRL, IGF1, CMP today   - If PRL still elevated and has evidence of tumor, then we will  try cabergoline with small dose such as half tab once a week.   - Recheck PRL in 2 month    # remote history of a microadenoma    - MRI pituitary   In May 2018 to evaluate results will be discussed over the phone.    # Hypothyroidism  - TSH, free T4 today, meanwhile continue current dose of levothyroxine 100 mcg daily.      - RTC with me in 7 month    I spent 45 minutes with this patient face to face and explained the conditions and plans (more than 50% of time was counseling/coordination of care, symptoms related elevated prolactin, follow up plan for pituitary tumor) . The patient understood and is satisfied with today's visit. Return to clinic with me in 7 months.         Shaunna Townsend MD  Staff Physician  Endocrinology and Metabolism  License: [id number]

## 2018-04-10 NOTE — PROGRESS NOTES
- Endocrinology Initial Consultation -    Reason for visit/consult: Elevated prolactin level, remote history of a pituitary microadenoma    Primary care provider: Doreen Aguila    HPI: A 52yo female here for evaluation of her elevated prolactin level.  Different from her primary care physician.     She has remote history of micro prolactinoma 12 years ago age 40, when she experienced galactorrhea and amenorrhea.  At that time her prolactin level was around 60-70s, she also underwent brain MRI which showed microadenoma per patient.  At the time she never tried any medical treatment and prolactin level spontaneously decreased and she stopped visiting endocrinologist at that time.   Meanwhile she was on progesterone p.o. for her hot flashes range from 100-300 mg daily.     She has been followed prolactin level approximately once a year at her primary care physician's office.  In 2015 her prolactin level was 42, however in November 2017 her prolactin level increased to 96.1, with a symptom of the breast tenderness, without the galactorrhea.     HA: no , Dizziness: no, knausea: no, fatigue: no, braest tendernss: for the 2 weeks, bilateral,   No galactorrhea, No hair loss, gainign weight: 5 lb past 1 year  Nirsutism: no, no Acrnes, no DM, mild HTN with norvasc, no change of shoes size      LMP: after acupuncture since 2008. Age 43. Prozac 40 mg daily, Ativan 0.5 mg twice a week    Other medical history includes, anxiety, depression and hypothryoidism for 4 years and taking levothryoixne 100 mcg, stable dose for a while.       Past Medical/Surgical History:  Past Medical History:   Diagnosis Date     Allergic rhinitis 8/11/2005     Anxiety disorder     Has Panic Attacks     History of prolactinoma     Dr. Fischer, [facility]     Mild recurrent major depression (H) 8/11/2005     Unspecified contraceptive management      Past Surgical  "History:   Procedure Laterality Date     BUNIONECTOMY RT/LT  2005    Right Foot     BUNIONECTOMY RT/LT  2006    Left Foot        Allergies:  Allergies   Allergen Reactions     No Known Drug Allergies        Current Medications   Current Outpatient Prescriptions   Medication     cholecalciferol (VITAMIN D3) 1000 UNIT tablet     FLUoxetine (PROZAC) 20 MG capsule     LORazepam (ATIVAN) 0.5 MG tablet     amLODIPine (NORVASC) 5 MG tablet     desonide (DESOWEN) 0.05 % ointment     traZODone (DESYREL) 100 MG tablet     levothyroxine (SYNTHROID,LEVOTHROID) 100 MCG tablet     progesterone (PROMETRIUM) 100 MG capsule     fluticasone (FLONASE) 50 MCG/ACT nasal spray     cetirizine (ZYRTEC) 10 MG tablet     No current facility-administered medications for this visit.        Family History:  Family History   Problem Relation Age of Onset     Hypertension Father      Breast Cancer Mother      CEREBROVASCULAR DISEASE Maternal Grandmother      Breast Cancer Maternal Grandmother    no family history of pituitary disease, no family history of thyroid dz, no family history of early menopause.     Social History:  Social History   Substance Use Topics     Smoking status: Never Smoker     Smokeless tobacco: Not on file     Alcohol use Yes      Comment: occ   Lives with room mate. Single     ROS:  Full review of systems taken with the help of the intake sheet. Otherwise a complete 14 point review of systems was taken and is negative unless stated in the history above.      Physical Exam:   Blood pressure 137/88, pulse 76, height 1.645 m (5' 4.75\"), weight 91.7 kg (202 lb 3.2 oz), not currently breastfeeding.    General: well appearing, no acute distress, pleasant and conversant,   Mental Status/neuro: alert and oriented  Face: symmetrical, normal facial color  Eyes: anicteric, PERRL, no proptosis or lid lag  Visual Field: intact  Occular motor: full  Facial skin: no acnes and no hirsutism  Neck: suppler, no lymphadenopahty  Thyroid: " normal size and texture, no nodule palpable, no bruits  Breast: no galactorrhea, very mild tenderness on the nipple bilateral  Heart: regular rhythm, S1S2, no murmur appreciated  Lung: clear to auscultation bilaterally  Abdomen: soft, NT/ND, no hepatomegaly, no striea  Legs: no swelling or edema        Labs : I reviewed data from epic and extract and summarize the pertinent data here.     11/2017 [facility];   PRL 96.1 (4.8-23.3)  TSH 1.141  Free T4 0.99  Vitamin D 24.86  WBC 7.7, hemoglobin 13.9, platelet 290  Lab Results   Component Value Date     06/12/2017      Lab Results   Component Value Date    POTASSIUM 3.8 06/12/2017     Lab Results   Component Value Date    CHLORIDE 105 06/12/2017     Lab Results   Component Value Date    ESTELLA 9.0 06/12/2017     Lab Results   Component Value Date    CO2 24 06/12/2017     Lab Results   Component Value Date    BUN 10 06/12/2017     Lab Results   Component Value Date    CR 0.67 06/12/2017     Lab Results   Component Value Date    GLC 94 06/12/2017 7/2015: PRL 42 (1.9-25),     MRI Brain: Last MRI in 2008 per patient but no report available.    No results found for this or any previous visit.      Assessment and Plan  53 year old female with elevated prolactin level remote history of a microadenoma,     # Elevated Prolactin levels  Patient has a history of galactorrhea and amenorrhea.  And currently has breast tenderness.   Also has remote history of microadenoma, prolactinoma possibly   We will check prolactin, IGF-I, CMP today.   - PRL, IGF1, CMP today   - If PRL still elevated and has evidence of tumor, then we will  try cabergoline with small dose such as half tab once a week.   - Recheck PRL in 2 month    # remote history of a microadenoma    - MRI pituitary  In May 2018 to evaluate results will be discussed over the phone.    # Hypothyroidism  - TSH, free T4 today, meanwhile continue current dose of levothyroxine 100 mcg daily.      - RTC with me in 7 month    I  spent 45 minutes with this patient face to face and explained the conditions and plans (more than 50% of time was counseling/coordination of care, symptoms related elevated prolactin, follow up plan for pituitary tumor) . The patient understood and is satisfied with today's visit. Return to clinic with me in 7 months.         Shaunna Townsend MD  Staff Physician  Endocrinology and Metabolism  License: [id number]

## 2018-04-10 NOTE — NURSING NOTE
"Chief Complaint   Patient presents with     Consult     PROLACTINOMA CONSULTATION        Initial /88 (BP Location: Right arm)  Pulse 76  Ht 1.645 m (5' 4.75\")  Wt 91.7 kg (202 lb 3.2 oz)  BMI 33.91 kg/m2 Estimated body mass index is 33.91 kg/(m^2) as calculated from the following:    Height as of this encounter: 1.645 m (5' 4.75\").    Weight as of this encounter: 91.7 kg (202 lb 3.2 oz).  Medication Reconciliation: complete           "

## 2018-04-10 NOTE — MR AVS SNAPSHOT
After Visit Summary   4/10/2018    Joan Lam    MRN: [id number]           Patient Information     Date Of Birth          1965        Visit Information        Provider Department      4/10/2018 3:30 PM Shaunna Townsend MD [facility]        Today's Diagnoses     Pituitary tumor    -  1    Prolactinoma (H)        Hypothyroidism, unspecified type          Care Instructions    Radiology/Imaging [phone]          Follow-ups after your visit        Your next 10 appointments already scheduled     Apr 10, 2018  4:45 PM CDT   LAB with [facility] ([facility])    [address] [place] [address] [phone]           Please do not eat 10-12 hours before your appointment if you are coming in fasting for labs on lipids, cholesterol, or glucose (sugar). This does not apply to pregnant women. Water, hot tea and black coffee (with nothing added) are okay. Do not drink other fluids, diet soda or chew gum.              Future tests that were ordered for you today     Open Future Orders        Priority Expected Expires Ordered    MRI Brain-PITUITARY  w & w/o contrast Routine  11/6/2018 4/10/2018    Prolactin Routine  4/10/2019 4/10/2018    TSH Routine  4/10/2019 4/10/2018    T4 free Routine  4/6/2019 4/10/2018    Comprehensive metabolic panel Routine  4/10/2019 4/10/2018    Insulin Growth Factor 1 by Immunoassay Routine  4/10/2019 4/10/2018            Who to contact     Please call your clinic at [phone] to:    Ask questions about your health    Make or cancel appointments    Discuss your medicines    Learn about your test results    Speak to your doctor            Additional Information About Your Visit        SubHubhart Information     [employer]ined [place] gives you secure access to your electronic health record. If you see a primary care provider, you can also send messages to your care team and make appointments. If you have questions,  "please call your primary care clinic.  If you do not have a primary care provider, please call [phone] and they will assist you.      Brandizi is an electronic gateway that provides easy, online access to your medical records. With Brandizi, you can request a clinic appointment, read your test results, renew a prescription or communicate with your care team.     To access your existing account, please contact your [facility] or call [phone] for assistance.        Care EveryWhere ID     This is your Care EveryWhere ID. This could be used by other organizations to access your [place] medical records  [id number]        Your Vitals Were     Pulse Height BMI (Body Mass Index)             76 1.645 m (5' 4.75\") 33.91 kg/m2          Blood Pressure from Last 3 Encounters:   04/10/18 137/88   12/13/17 138/89   08/03/17 128/85    Weight from Last 3 Encounters:   04/10/18 91.7 kg (202 lb 3.2 oz)   12/13/17 90.7 kg (200 lb)   08/03/17 92.5 kg (204 lb)               Primary Care Provider Office Phone # Fax #    Doreen Aguila [phone]       [address] [facility] [address]        Equal Access to Services     LAURA JACOBSON : Hadii rip ku hadasho Soomaali, waaxda luqadaha, qaybta kaalmada adeegyada, shital de leónin hayterrancen di graham . So [facility] [phone].    ATENCIÓN: Si habla español, tiene a pérez disposición servicios gratuitos de asistencia lingüística. [facility] [phone].    We comply with applicable federal civil rights laws and [place] laws. We do not discriminate on the basis of race, color, national origin, age, disability, sex, sexual orientation, or gender identity.            Thank you!     Thank you for choosing [facility]  for your care. Our goal is always to provide you with excellent care. Hearing back from our patients is one way we can continue to improve our services. Please take a few minutes to complete the written survey " that you may receive in the mail after your visit with us. Thank you!             Your Updated Medication List - Protect others around you: Learn how to safely use, store and throw away your medicines at www.disposemymeds.org.          This list is accurate as of 4/10/18  4:37 PM.  Always use your most recent med list.                   Brand Name Dispense Instructions for use Diagnosis    amLODIPine 5 MG tablet    NORVASC    90 tablet    Take 1 tablet (5 mg) by mouth daily    Essential hypertension with goal blood pressure less than 140/90       cetirizine 10 MG tablet    zyrTEC     Take 10 mg by mouth daily        cholecalciferol 1000 UNIT tablet    vitamin D3     Take 3 tablets (3,000 Units) by mouth daily        desonide 0.05 % ointment    DESOWEN    15 g    Apply topically 2 times daily as needed up to one week at a time.    Eczema of eyelid, unspecified laterality       FLUoxetine 20 MG capsule    PROzac    180 capsule    Take 2 capsules (40 mg) by mouth daily    NATALIE (generalized anxiety disorder), Recurrent major depressive disorder, in partial remission (H)       fluticasone 50 MCG/ACT spray    FLONASE     Spray 2 sprays into both nostrils daily        levothyroxine 100 MCG tablet    SYNTHROID/LEVOTHROID     Take 1 tablet by mouth daily        LORazepam 0.5 MG tablet    ATIVAN    60 tablet    Take 1 tablet (0.5 mg) by mouth daily as needed    NATALIE (generalized anxiety disorder)       progesterone 100 MG capsule    PROMETRIUM     Take 300 mg by mouth At Bedtime        traZODone 100 MG tablet    DESYREL     Take 0.5-1 tablets ( mg) by mouth nightly as needed for sleep    Insomnia, unspecified type

## 2018-04-12 DIAGNOSIS — D35.2 PROLACTINOMA (H): Primary | ICD-10-CM

## 2018-04-12 RX ORDER — CABERGOLINE 0.5 MG/1
0.25 TABLET ORAL WEEKLY
Qty: 4 TABLET | Refills: 3 | Status: SHIPPED | OUTPATIENT
Start: 2018-04-12 | End: 2018-11-29

## 2018-04-12 NOTE — PROGRESS NOTES
Results for SANDRA CLIFFORD (MRN [id number]) as of 4/12/2018 10:55   Ref. Range 4/10/2018 16:45   Prolactin Latest Ref Range: 3 - 27 ug/L 67 (H)     Notified patient and start cabergoline 0.25 mg once a week.        Shaunna Townsend MD  Endocrinology and Metabolism  [facility]

## 2018-04-12 NOTE — PROGRESS NOTES
Dear Joan     Here is your results. Prescribed cabergoline half tablet once a week.  Please continue the plan we discussed.  Please call nursing line at [phone] if you have any questions.    Take care  Shaunna Townsend MD

## 2018-04-13 LAB — IGF-I BLD-MCNC: 102 NG/ML (ref 55–235)

## 2018-04-15 NOTE — PROGRESS NOTES
Dear Joan     Here is your results. Other hormone (IGF1) was within normal limits.  Please continue the plan we discussed.  Please call nursing line at [phone] if you have any questions.    Take care  Shaunna Townsend MD

## 2018-06-12 DIAGNOSIS — I10 ESSENTIAL HYPERTENSION WITH GOAL BLOOD PRESSURE LESS THAN 140/90: ICD-10-CM

## 2018-06-12 NOTE — TELEPHONE ENCOUNTER
"Requested Prescriptions   Pending Prescriptions Disp Refills     amLODIPine (NORVASC) 5 MG tablet [Pharmacy Med Name: AMLODIPINE BESYLATE 5MG TABLETS]  Last Written Prescription Date:  6-12-17  Last Fill Quantity: 90 tab,  # refills: 3   Last office visit: 12/13/2017 with prescribing provider:  Doreen Aguila   Future Office Visit:    90 tablet 0     Sig: TAKE 1 TABLET(5 MG) BY MOUTH DAILY    Calcium Channel Blockers Protocol  Passed    6/12/2018  4:23 PM       Passed - Blood pressure under 140/90 in past 12 months    BP Readings from Last 3 Encounters:   04/10/18 137/88   12/13/17 138/89   08/03/17 128/85          Passed - Recent (12 mo) or future (30 days) visit within the authorizing provider's specialty    Patient had office visit in the last 12 months or has a visit in the next 30 days with authorizing provider or within the authorizing provider's specialty.  See \"Patient Info\" tab in inbasket, or \"Choose Columns\" in Meds & Orders section of the refill encounter.           Passed - Patient is age 18 or older       Passed - No active pregnancy on record       Passed - Normal serum creatinine on file in past 12 months    Recent Labs   Lab Test  04/10/18   1645   CR  0.69          Passed - No positive pregnancy test in past 12 months          "

## 2018-06-14 RX ORDER — AMLODIPINE BESYLATE 5 MG/1
TABLET ORAL
Qty: 90 TABLET | Refills: 1 | Status: SHIPPED | OUTPATIENT
Start: 2018-06-14 | End: 2018-11-29

## 2018-07-24 NOTE — PROGRESS NOTES
SUBJECTIVE:  Joan Lam, a 53 year old female, is here to discuss the following issues:     HYPERTENSION FOLLOW-UP    Current medication regimen includes amlodipine.   Patient reports no problems or side effects with the medication.    DEPRESSION AND ANXIETY FOLLOW-UP   She continues to take fluoxetine 40 QD and lorazepam 0.5 mg PRN with no noted side effects.    She has been getting #60 lorazepam every 3-6 months and has a controlled substance agreement.  Her last script for lorazepam was 7 months ago.  She describes her mood and anxiety as much improved since changing jobs.  She now works as an  for [employer].    PHQ-9 8/3/2017 12/13/2017 7/26/2018   Total Score 2 9 1   Q9: Suicide Ideation Not at all Not at all Not at all     NATALIE-7 SCORE 8/3/2017 12/13/2017 7/26/2018   Total Score - - -   Total Score - - 5 (mild anxiety)   Total Score 2 8 5     ELEVATED PROLACTIN LEVELS  She has a remote history of prolactinoma and re-established care with Endocrinology this past spring when prolactin levels were noted to be elevated.  Dr. Townsend ordered MRI and started her on cabergoline 0.25 MG once a week with plan to check MRI and recheck prolactin level in 2 months.  Given the job and insurance changes she deferred both the MRI and medication but did have repeat levels checked through her NP which showed decreasing prolactin.  She has had 2 periods in the past year - after several years of amenorrhea.    Problem list and histories reviewed & updated, as indicated.  Patient Active Problem List   Diagnosis     Allergic rhinitis     Mild recurrent major depression (H)     Eczema of eyelid     Anxiety disorder     Essential hypertension with goal blood pressure less than 140/90       BP Readings from Last 3 Encounters:   07/26/18 135/83   04/10/18 137/88   12/13/17 138/89    Wt Readings from Last 3 Encounters:   07/26/18 204 lb (92.5 kg)   04/10/18 202 lb 3.2 oz (91.7 kg)   12/13/17 200 lb (90.7  kg)          ROS:  RESP: NEGATIVE for shortness of breath  CV: NEGATIVE for chest pain    OBJECTIVE:    /83  Pulse 72  Temp 98.1  F (36.7  C) (Oral)  Resp 16  Wt 204 lb (92.5 kg)  LMP 06/10/2018 (Approximate)  SpO2 96%  BMI 34.21 kg/m2  GEN:  no apparent distress  ENT: external ears and nose without lesions or scars and oropharynx clear with moist mucus membranes and normal landmarks  NECK:  Supple without adenopathy, mass, or thyromegaly  LUNGS:  normal respiratory effort, and lungs clear to auscultation bilaterally - no rales, rhonchi or wheezes  CV: regular rate and rhythm, normal S1 S2, no S3 or S4 and no murmur, click or rub     ASSESSMENT/PLAN:  1. Major depressive disorder, recurrent episode, in full remission (H)  Improved control with recent job change.  She'll continue on fluoxetine for now.  - DEPRESSION ACTION PLAN (DAP)  - FLUoxetine (PROZAC) 20 MG capsule; Take 2 capsules (40 mg) by mouth daily  Dispense: 180 capsule; Refill: 1    2. NATALIE (generalized anxiety disorder)  Improved control with recent job change.  She has needed and used much less lorazepam.    - LORazepam (ATIVAN) 0.5 MG tablet; Take 1 tablet (0.5 mg) by mouth daily as needed  Dispense: 60 tablet; Refill: 0  - FLUoxetine (PROZAC) 20 MG capsule; Take 2 capsules (40 mg) by mouth daily  Dispense: 180 capsule; Refill: 1    3. Essential hypertension with goal blood pressure less than 140/90  stable/well controlled - continue amlodipine.     4. Postmenopausal bleeding  Discussed that postmenopausal bleeding can be a sign of endometrial cancer or precancerous changes and does require further workup.  Certainly the bleeding could be due to hormonal changes, including (potentially) falling prolactin levels but I do recommend further workup and discussed that this would typically start with imaging.  I ordered pelvic ultrasound.    - US Pelvic Complete with Transvaginal; Future     Patient is behind on multiple preventive measures and  I encouraged her to schedule mammogram and routine preventive physical with pap smear.      Patient Instructions   Call [facility] at [phone] ([facility] - [address], [address]) to schedule pelvic ultrasound.      Schedule your mammogram and routine preventive physical with pap.      Doreen Aguila MD   [facility]

## 2018-07-26 ENCOUNTER — OFFICE VISIT (OUTPATIENT)
Dept: FAMILY MEDICINE | Facility: CLINIC | Age: 53
End: 2018-07-26
Payer: COMMERCIAL

## 2018-07-26 VITALS
TEMPERATURE: 98.1 F | WEIGHT: 204 LBS | OXYGEN SATURATION: 96 % | DIASTOLIC BLOOD PRESSURE: 83 MMHG | RESPIRATION RATE: 16 BRPM | SYSTOLIC BLOOD PRESSURE: 135 MMHG | HEART RATE: 72 BPM | BODY MASS INDEX: 34.21 KG/M2

## 2018-07-26 DIAGNOSIS — F33.42 MAJOR DEPRESSIVE DISORDER, RECURRENT EPISODE, IN FULL REMISSION (H): Primary | ICD-10-CM

## 2018-07-26 DIAGNOSIS — N95.0 POSTMENOPAUSAL BLEEDING: ICD-10-CM

## 2018-07-26 DIAGNOSIS — I10 ESSENTIAL HYPERTENSION WITH GOAL BLOOD PRESSURE LESS THAN 140/90: ICD-10-CM

## 2018-07-26 DIAGNOSIS — F41.1 GAD (GENERALIZED ANXIETY DISORDER): ICD-10-CM

## 2018-07-26 PROCEDURE — 99214 OFFICE O/P EST MOD 30 MIN: CPT | Performed by: FAMILY MEDICINE

## 2018-07-26 RX ORDER — LORAZEPAM 0.5 MG/1
0.5 TABLET ORAL DAILY PRN
Qty: 60 TABLET | Refills: 0 | Status: SHIPPED | OUTPATIENT
Start: 2018-07-26 | End: 2018-11-29

## 2018-07-26 ASSESSMENT — ANXIETY QUESTIONNAIRES
2. NOT BEING ABLE TO STOP OR CONTROL WORRYING: NOT AT ALL
1. FEELING NERVOUS, ANXIOUS, OR ON EDGE: SEVERAL DAYS
GAD7 TOTAL SCORE: 5
3. WORRYING TOO MUCH ABOUT DIFFERENT THINGS: SEVERAL DAYS
6. BECOMING EASILY ANNOYED OR IRRITABLE: NOT AT ALL
7. FEELING AFRAID AS IF SOMETHING AWFUL MIGHT HAPPEN: SEVERAL DAYS
5. BEING SO RESTLESS THAT IT IS HARD TO SIT STILL: NOT AT ALL
GAD7 TOTAL SCORE: 5
4. TROUBLE RELAXING: MORE THAN HALF THE DAYS
GAD7 TOTAL SCORE: 5
7. FEELING AFRAID AS IF SOMETHING AWFUL MIGHT HAPPEN: SEVERAL DAYS

## 2018-07-26 ASSESSMENT — PATIENT HEALTH QUESTIONNAIRE - PHQ9
10. IF YOU CHECKED OFF ANY PROBLEMS, HOW DIFFICULT HAVE THESE PROBLEMS MADE IT FOR YOU TO DO YOUR WORK, TAKE CARE OF THINGS AT HOME, OR GET ALONG WITH OTHER PEOPLE: NOT DIFFICULT AT ALL
SUM OF ALL RESPONSES TO PHQ QUESTIONS 1-9: 1
SUM OF ALL RESPONSES TO PHQ QUESTIONS 1-9: 1

## 2018-07-26 NOTE — MR AVS SNAPSHOT
After Visit Summary   7/26/2018    Joan Lam    MRN: [id number]           Patient Information     Date Of Birth          1965        Visit Information        Provider Department      7/26/2018 9:00 AM Doreen Aguila MD [facility]        Today's Diagnoses     Screening for malignant neoplasm of cervix    -  1    Mild recurrent major depression (H)        NATALIE (generalized anxiety disorder)        Postmenopausal bleeding        Recurrent major depressive disorder, in partial remission (H)          Care Instructions    Call [facility] at [phone] ([facility] - [address] [place] [address] [facility] [address] to schedule pelvic ultrasound.      Schedule your mammogram and routine preventive physical with pap.              Follow-ups after your visit        Future tests that were ordered for you today     Open Future Orders        Priority Expected Expires Ordered    US Pelvic Complete with Transvaginal Routine 10/24/2018 1/22/2019 7/26/2018            Who to contact     If you have questions or need follow up information about today's clinic visit or your schedule please contact [facility] directly at [phone].  Normal or non-critical lab and imaging results will be communicated to you by Social Yuppieshart, letter or phone within 4 business days after the clinic has received the results. If you do not hear from us within 7 days, please contact the clinic through [employer] or phone. If you have a critical or abnormal lab result, we will notify you by phone as soon as possible.  Submit refill requests through [employer] or call your pharmacy and they will forward the refill request to us. Please allow 3 business days for your refill to be completed.          Additional Information About Your Visit        Social Yuppieshart Information     [employer] gives you secure access to your electronic health record. If you see a primary care provider, you can also send  messages to your care team and make appointments. If you have questions, please call your primary care clinic.  If you do not have a primary care provider, please call [phone] and they will assist you.        Care EveryWhere ID     This is your Care EveryWhere ID. This could be used by other organizations to access your [place] medical records  [id number]        Your Vitals Were     Pulse Temperature Respirations Last Period Pulse Oximetry BMI (Body Mass Index)    72 98.1  F (36.7  C) (Oral) 16 06/10/2018 (Approximate) 96% 34.21 kg/m2       Blood Pressure from Last 3 Encounters:   07/26/18 135/83   04/10/18 137/88   12/13/17 138/89    Weight from Last 3 Encounters:   07/26/18 204 lb (92.5 kg)   04/10/18 202 lb 3.2 oz (91.7 kg)   12/13/17 200 lb (90.7 kg)              We Performed the Following     DEPRESSION ACTION PLAN (DAP)          Today's Medication Changes          These changes are accurate as of 7/26/18  9:18 AM.  If you have any questions, ask your nurse or doctor.               Stop taking these medicines if you haven't already. Please contact your care team if you have questions.     traZODone 100 MG tablet   Commonly known as:  DESYREL   Stopped by:  Doreen Aguila MD                Where to get your medicines      These medications were sent to [facility] [address] [place] [address] AT [address] [address]     Phone:  [phone]     FLUoxetine 20 MG capsule         Some of these will need a paper prescription and others can be bought over the counter.  Ask your nurse if you have questions.     Bring a paper prescription for each of these medications     LORazepam 0.5 MG tablet                Primary Care Provider Office Phone # Fax #    Doreen Aguila [phone] [address]  [facility] [address]        Equal Access to Services     LAURA JACOBSON AH: yoon Silva qaybta  shital sherman mag hooks. So [facility] [phone].    ATENCIÓN: Si sabino alexander, tiene a pérez disposición servicios gratuitos de asistencia lingüística. Ervin al [phone].    We comply with applicable federal civil rights laws and [place] laws. We do not discriminate on the basis of race, color, national origin, age, disability, sex, sexual orientation, or gender identity.            Thank you!     Thank you for choosing [facility]  for your care. Our goal is always to provide you with excellent care. Hearing back from our patients is one way we can continue to improve our services. Please take a few minutes to complete the written survey that you may receive in the mail after your visit with us. Thank you!             Your Updated Medication List - Protect others around you: Learn how to safely use, store and throw away your medicines at www.disposemymeds.org.          This list is accurate as of 7/26/18  9:18 AM.  Always use your most recent med list.                   Brand Name Dispense Instructions for use Diagnosis    amLODIPine 5 MG tablet    NORVASC    90 tablet    TAKE 1 TABLET(5 MG) BY MOUTH DAILY    Essential hypertension with goal blood pressure less than 140/90       cabergoline 0.5 MG tablet    DOSTINEX    4 tablet    Take 0.5 tablets (0.25 mg) by mouth once a week    Prolactinoma (H)       cetirizine 10 MG tablet    zyrTEC     Take 10 mg by mouth daily        cholecalciferol 1000 UNIT tablet    vitamin D3     Take 3 tablets (3,000 Units) by mouth daily        desonide 0.05 % ointment    DESOWEN    15 g    Apply topically 2 times daily as needed up to one week at a time.    Eczema of eyelid, unspecified laterality       FLUoxetine 20 MG capsule    PROzac    180 capsule    Take 2 capsules (40 mg) by mouth daily    NATALIE (generalized anxiety disorder), Recurrent major depressive disorder, in partial remission (H)       fluticasone 50 MCG/ACT spray     FLONASE     Spray 2 sprays into both nostrils daily        levothyroxine 100 MCG tablet    SYNTHROID/LEVOTHROID     Take 1 tablet by mouth daily        LORazepam 0.5 MG tablet    ATIVAN    60 tablet    Take 1 tablet (0.5 mg) by mouth daily as needed    NATALIE (generalized anxiety disorder)       progesterone 100 MG capsule    PROMETRIUM     Take 300 mg by mouth At Bedtime

## 2018-07-26 NOTE — PATIENT INSTRUCTIONS
Call [facility] at [phone] ([facility] - [address] [place] [address], [address]) to schedule pelvic ultrasound.      Schedule your mammogram and routine preventive physical with pap.

## 2018-07-26 NOTE — LETTER
My Depression Action Plan  Name: Joan Lam   Date of Birth 1965  Date: 7/26/2018    My doctor: Doreen Aguila   My clinic: [address] [address] [phone]          GREEN    ZONE   Good Control    What it looks like:     Things are going generally well. You have normal up s and down s. You may even feel depressed from time to time, but bad moods usually last less than a day.   What you need to do:  1. Continue to care for yourself (see self care plan)  2. Check your depression survival kit and update it as needed  3. Follow your physician s recommendations including any medication.  4. Do not stop taking medication unless you consult with your physician first.           YELLOW         ZONE Getting Worse    What it looks like:     Depression is starting to interfere with your life.     It may be hard to get out of bed; you may be starting to isolate yourself from others.    Symptoms of depression are starting to last most all day and this has happened for several days.     You may have suicidal thoughts but they are not constant.   What you need to do:     1. Call your care team, your response to treatment will improve if you keep your care team informed of your progress. Yellow periods are signs an adjustment may need to be made.     2. Continue your self-care, even if you have to fake it!    3. Talk to someone in your support network    4. Open up your depression survival kit           RED    ZONE Medical Alert - Get Help    What it looks like:     Depression is seriously interfering with your life.     You may experience these or other symptoms: You can t get out of bed most days, can t work or engage in other necessary activities, you have trouble taking care of basic hygiene, or basic responsibilities, thoughts of suicide or death that will not go away, self-injurious behavior.     What you need to do:  1. Call your care team and  request a same-day appointment. If they are not available (weekends or after hours) call your local crisis line, emergency room or 911.            Depression Self Care Plan / Survival Kit    Self-Care for Depression  Here s the deal. Your body and mind are really not as separate as most people think.  What you do and think affects how you feel and how you feel influences what you do and think. This means if you do things that people who feel good do, it will help you feel better.  Sometimes this is all it takes.  There is also a place for medication and therapy depending on how severe your depression is, so be sure to consult with your medical provider and/ or Behavioral Health Consultant if your symptoms are worsening or not improving.     In order to better manage my stress, I will:    Exercise  Get some form of exercise, every day. This will help reduce pain and release endorphins, the  feel good  chemicals in your brain. This is almost as good as taking antidepressants!  This is not the same as joining a gym and then never going! (they count on that by the way ) It can be as simple as just going for a walk or doing some gardening, anything that will get you moving.      Hygiene   Maintain good hygiene (Get out of bed in the morning, Make your bed, Brush your teeth, Take a shower, and Get dressed like you were going to work, even if you are unemployed).  If your clothes don't fit try to get ones that do.    Diet  I will strive to eat foods that are good for me, drink plenty of water, and avoid excessive sugar, caffeine, alcohol, and other mood-altering substances.  Some foods that are helpful in depression are: complex carbohydrates, B vitamins, flaxseed, fish or fish oil, fresh fruits and vegetables.    Psychotherapy  I agree to participate in Individual Therapy (if recommended).    Medication  If prescribed medications, I agree to take them.  Missing doses can result in serious side effects.  I understand that  drinking alcohol, or other illicit drug use, may cause potential side effects.  I will not stop my medication abruptly without first discussing it with my provider.    Staying Connected With Others  I will stay in touch with my friends, family members, and my primary care provider/team.    Use your imagination  Be creative.  We all have a creative side; it doesn t matter if it s oil painting, sand castles, or mud pies! This will also kick up the endorphins.    Witness Beauty  (AKA stop and smell the roses) Take a look outside, even in mid-winter. Notice colors, textures. Watch the squirrels and birds.     Service to others  Be of service to others.  There is always someone else in need.  By helping others we can  get out of ourselves  and remember the really important things.  This also provides opportunities for practicing all the other parts of the program.    Humor  Laugh and be silly!  Adjust your TV habits for less news and crime-drama and more comedy.    Control your stress  Try breathing deep, massage therapy, biofeedback, and meditation. Find time to relax each day.     My support system    Clinic Contact:  Phone number:    Contact 1:  Phone number:    Contact 2:  Phone number:    Christian/:  Phone number:    Therapist:  Phone number:    Local crisis center:    Phone number:    Other community support:  Phone number:

## 2018-07-27 ASSESSMENT — ANXIETY QUESTIONNAIRES: GAD7 TOTAL SCORE: 5

## 2018-07-27 ASSESSMENT — PATIENT HEALTH QUESTIONNAIRE - PHQ9: SUM OF ALL RESPONSES TO PHQ QUESTIONS 1-9: 1

## 2018-07-30 ENCOUNTER — RADIANT APPOINTMENT (OUTPATIENT)
Dept: ULTRASOUND IMAGING | Facility: CLINIC | Age: 53
End: 2018-07-30
Attending: FAMILY MEDICINE
Payer: COMMERCIAL

## 2018-07-30 ENCOUNTER — RADIANT APPOINTMENT (OUTPATIENT)
Dept: MAMMOGRAPHY | Facility: CLINIC | Age: 53
End: 2018-07-30
Attending: FAMILY MEDICINE
Payer: COMMERCIAL

## 2018-07-30 DIAGNOSIS — Z12.31 VISIT FOR SCREENING MAMMOGRAM: ICD-10-CM

## 2018-07-30 DIAGNOSIS — N95.0 POSTMENOPAUSAL BLEEDING: ICD-10-CM

## 2018-07-30 PROCEDURE — 77067 SCR MAMMO BI INCL CAD: CPT

## 2018-07-30 PROCEDURE — 76856 US EXAM PELVIC COMPLETE: CPT | Performed by: OBSTETRICS & GYNECOLOGY

## 2018-07-30 PROCEDURE — 76830 TRANSVAGINAL US NON-OB: CPT | Performed by: OBSTETRICS & GYNECOLOGY

## 2018-08-02 NOTE — PROGRESS NOTES
Kalpesh Doss for completing the pelvic ultrasound.  Your ovaries look fine.  Your endometrial lining is slightly thickened.  I recommend that you see one of the gynecologists to discuss these findings and determine if any further workup (such as biopsy) is advised.  You can schedule that with Dr. Low at [facility] at [phone] or any of the gynecologists at her main office: [facility] at [phone] ([facility] - [address] [place] [address], [address]).     Doreen Aguila MD

## 2018-08-14 ENCOUNTER — OFFICE VISIT (OUTPATIENT)
Dept: OBGYN | Facility: CLINIC | Age: 53
End: 2018-08-14
Payer: COMMERCIAL

## 2018-08-14 VITALS
DIASTOLIC BLOOD PRESSURE: 104 MMHG | WEIGHT: 198 LBS | HEART RATE: 115 BPM | OXYGEN SATURATION: 98 % | BODY MASS INDEX: 33.2 KG/M2 | SYSTOLIC BLOOD PRESSURE: 177 MMHG

## 2018-08-14 DIAGNOSIS — N95.0 POSTMENOPAUSAL BLEEDING: Primary | ICD-10-CM

## 2018-08-14 PROCEDURE — 88305 TISSUE EXAM BY PATHOLOGIST: CPT | Performed by: OBSTETRICS & GYNECOLOGY

## 2018-08-14 PROCEDURE — 99202 OFFICE O/P NEW SF 15 MIN: CPT | Mod: 25 | Performed by: OBSTETRICS & GYNECOLOGY

## 2018-08-14 PROCEDURE — 58100 BIOPSY OF UTERUS LINING: CPT | Performed by: OBSTETRICS & GYNECOLOGY

## 2018-08-14 NOTE — MR AVS SNAPSHOT
After Visit Summary   8/14/2018    Joan Lam    MRN: [id number]           Patient Information     Date Of Birth          1965        Visit Information        Provider Department      8/14/2018 3:00 PM Hayley Low MD [facility]        Today's Diagnoses     Postmenopausal bleeding    -  1       Follow-ups after your visit        Who to contact     If you have questions or need follow up information about today's clinic visit or your schedule please contact [facility] directly at [phone].  Normal or non-critical lab and imaging results will be communicated to you by MyChart, letter or phone within 4 business days after the clinic has received the results. If you do not hear from us within 7 days, please contact the clinic through [employer] or phone. If you have a critical or abnormal lab result, we will notify you by phone as soon as possible.  Submit refill requests through [employer] or call your pharmacy and they will forward the refill request to us. Please allow 3 business days for your refill to be completed.          Additional Information About Your Visit        MyChart Information     [employer] gives you secure access to your electronic health record. If you see a primary care provider, you can also send messages to your care team and make appointments. If you have questions, please call your primary care clinic.  If you do not have a primary care provider, please call [phone] and they will assist you.        Care EveryWhere ID     This is your Care EveryWhere ID. This could be used by other organizations to access your [facility] medical records  [id number]        Your Vitals Were     Pulse Pulse Oximetry Breastfeeding? BMI (Body Mass Index)          115 98% No 33.2 kg/m2         Blood Pressure from Last 3 Encounters:   08/14/18 (!) 177/104   07/26/18 135/83   04/10/18 137/88    Weight from Last 3 Encounters:   08/14/18 198 lb (89.8 kg)   07/26/18  204 lb (92.5 kg)   04/10/18 202 lb 3.2 oz (91.7 kg)              We Performed the Following     ENDOMETRIAL BIOPSY W/O CERVICAL DILATION     Surgical pathology exam        Primary Care Provider Office Phone # Fax #    Doreen Aguila [phone] [address]  [facility] [address]        Equal Access to Services     [facility] : Hadii aad ku hadasho Soomaali, waaxda luqadaha, qaybta kaalmada adeegyada, waxay idiin hayaan adeeg kharash la'aan . So [facility] [phone].    ATENCIÓN: Si habla catherine, tiene a pérez disposición servicios gratuitos de asistencia lingüística. Darcyame al [phone].    We comply with applicable federal civil rights laws and [place] laws. We do not discriminate on the basis of race, color, national origin, age, disability, sex, sexual orientation, or gender identity.            Thank you!     Thank you for choosing [facility]  for your care. Our goal is always to provide you with excellent care. Hearing back from our patients is one way we can continue to improve our services. Please take a few minutes to complete the written survey that you may receive in the mail after your visit with us. Thank you!             Your Updated Medication List - Protect others around you: Learn how to safely use, store and throw away your medicines at www.disposemymeds.org.          This list is accurate as of 8/14/18  4:26 PM.  Always use your most recent med list.                   Brand Name Dispense Instructions for use Diagnosis    amLODIPine 5 MG tablet    NORVASC    90 tablet    TAKE 1 TABLET(5 MG) BY MOUTH DAILY    Essential hypertension with goal blood pressure less than 140/90       cabergoline 0.5 MG tablet    DOSTINEX    4 tablet    Take 0.5 tablets (0.25 mg) by mouth once a week    Prolactinoma (H)       cetirizine 10 MG tablet    zyrTEC     Take 10 mg by mouth daily        cholecalciferol 1000 UNIT tablet    vitamin D3     Take 3 tablets (3,000 Units) by  mouth daily        desonide 0.05 % ointment    DESOWEN    15 g    Apply topically 2 times daily as needed up to one week at a time.    Eczema of eyelid, unspecified laterality       FLUoxetine 20 MG capsule    PROzac    180 capsule    Take 2 capsules (40 mg) by mouth daily    NATALIE (generalized anxiety disorder), Major depressive disorder, recurrent episode, in full remission (H)       fluticasone 50 MCG/ACT spray    FLONASE     Spray 2 sprays into both nostrils daily        levothyroxine 100 MCG tablet    SYNTHROID/LEVOTHROID     Take 1 tablet by mouth daily        LORazepam 0.5 MG tablet    ATIVAN    60 tablet    Take 1 tablet (0.5 mg) by mouth daily as needed    NATALIE (generalized anxiety disorder)       progesterone 100 MG capsule    PROMETRIUM     Take 300 mg by mouth At Bedtime

## 2018-08-14 NOTE — PROGRESS NOTES
Joan Lam is a 53 year old  who presents to assess vaginal bleeding.  She had no bleeding for a few years, but in the past year had fairly heavy bleeding beginning April 10 and then later bleeding episodes in  and July.  She had had increased pain and cramping with the bleeding.  She has a known prolactinoma, with most recent prolactin level 67 on 4/10/2018.       She has been taking Prometrium, 200-300 mg, daily in order to improve sleep and well-being.  Her provider [facility] has monitored this medication.    An ultrasound done 2018 showed small simple bilateral ovarian cysts (stable from prior ultrasound in ).  The endometrium measured 4.9 mm.    We discussed postmenopausal bleeding.  We discussed estrogen-depletion atrophy, breakthrough bleeding.  We discussed cervical problems.  We discussed endometrial hyperplasia, and endometrial cancer.  We discussed treatment of the prior problems.  We discussed further evaluation with ultrasound, endometrial assessment.  We discussed that while ultrasound does not offer a pathologic diagnosis, a thin endometrium is reassuring.  We discussed office endometrial biopsy.  We discussed hysteroscopy and D&C in the operating room, with local anesthetic and sedation.  Operative risks and benefits were discussed with the patient and questions were answered.  These risks include but are not limited to anesthesia, injury to internal organs, uterine perforation, bleeding, infection, and need for further surgery.  Questions answered.        After discussion, she wished to proceed with endometrial biopsy.       OBJECTIVE: BP (!) 177/104  Pulse 115  Wt 198 lb (89.8 kg)  SpO2 98%  Breastfeeding? No  BMI 33.2 kg/m2     Informed consent was obtained for endometrial biopsy.  Using standard technique and hibiclens prep of the cervix and vagina, pipelle aspiration of the endometrium was obtained.  The cervix was grasped anteriorly with  a tenaculum.  The uterus sounded to 7 cm.  Tissue was obtained, placed in a container with formalin, labeled, and submitted to pathology.    The patient tolerated the procedure well.  Blood loss was less than one cc.  Standard post-biopsy precautions were given.    ASSESSMENT:  Postmenopausal bleeding.  Prolactinoma, elevated prolactin.      PLAN:  Call for biopsy results in 1 week.  Will re-evaluate depending on findings.  If benign, she may observe.       In addition to the procedure, I spent 20 minutes with the patient, with more than 50% spent in counseling/discussing the diagnosis and treatment options.

## 2018-08-14 NOTE — NURSING NOTE
"Chief Complaint   Patient presents with     Consult       Initial There were no vitals taken for this visit. Estimated body mass index is 34.21 kg/(m^2) as calculated from the following:    Height as of 4/10/18: 5' 4.75\" (1.645 m).    Weight as of 18: 204 lb (92.5 kg).  BP completed using cuff size: regular        The following HM Due: NONE      The following patient reported/Care Every where data was sent to:  P ABSTRACT QUALITY INITIATIVES [55830]  none    n/a              "

## 2018-08-17 LAB — COPATH REPORT: NORMAL

## 2018-08-21 ENCOUNTER — MYC MEDICAL ADVICE (OUTPATIENT)
Dept: OBGYN | Facility: CLINIC | Age: 53
End: 2018-08-21

## 2018-08-22 NOTE — TELEPHONE ENCOUNTER
Spoke with patient--discussed simple hyperplasia found on biopsy.  She has not been taking prometrium consistently for a while. We discussed complex endometrial hyperplasia, options of treatment of hyperplasia.  We discussed endometrial cancer, referral to GYN/Oncology for further treatment including surgery if indicated.  After discussion, she will continue oral prometrium 200 mg daily, will call if she needs refill.  She will call if she has abnormal bleeding.  If all is well, will plan repeat office endometrial biopsy in about 2 months, late October 2018.  Questions answered, she agrees with plan.

## 2018-09-30 ENCOUNTER — HEALTH MAINTENANCE LETTER (OUTPATIENT)
Age: 53
End: 2018-09-30

## 2018-10-23 ENCOUNTER — TELEPHONE (OUTPATIENT)
Dept: FAMILY MEDICINE | Facility: CLINIC | Age: 53
End: 2018-10-23

## 2018-10-23 DIAGNOSIS — R23.4 SKIN TEXTURE CHANGES: ICD-10-CM

## 2018-10-23 DIAGNOSIS — B07.9 WART ON THUMB: Primary | ICD-10-CM

## 2018-10-23 NOTE — TELEPHONE ENCOUNTER
Reason for Call:  Other call back    Detailed comments: Patient has questions about dermatology     Phone Number Patient can be reached at: Home number on file [phone] (home)    Best Time: Any     Can we leave a detailed message on this number? YES    Call taken on 10/23/2018 at 12:49 PM by Chris Ramirez

## 2018-10-23 NOTE — TELEPHONE ENCOUNTER
Writer called patient who stated:  1. Saw dermatology about 1 year ago to have rough areas on hands removed   A. Not moles, but they have recurred and she needs them removed  2. Also has a wart on thumb  3. Should she see dermatology or can she come in to family practice clinic?    Writer explained to patient wart on thumb can be addressed at [facility] by a provider, but for the other concerns on hands that she would like removed, probable best to see dermatology.    Offered to place dermatology referral.    Patient verbalized understanding and in agreement with plan.    Patient would like to see dermatology and was last seen at [facility] in [place], [place].  Has their phone number.    Dermatology referral ordered.    TIFFANY AshbyN, RN

## 2018-11-28 NOTE — PROGRESS NOTES
SUBJECTIVE:  Joan Lam, a 53 year old female, is here to discuss the following issues:     Depression and Anxiety Follow-Up    She continues to take fluoxetine 40 mg QD with no problems or noted side effects.     She also uses lorazepam 0.5 mg prn for anxiety.  She has been getting #60 tabs every 3-6 months and she last received #60 tabs 4 months ago.    Status since last visit: Mood is improved but still struggling with anxiety.  She notes that the prometrium is also calming and helpful for managing anxiety.    Complicating factors:     Significant life event: Yes-  Has had 4 jobs in past year.  This has been very stressful.  She now has started a new job that is a permanent, full-time position with [employer] and is pleased about that.  (She just started 2 weeks ago.)     PHQ 12/13/2017 7/26/2018 11/29/2018   PHQ-9 Total Score 9 1 4   Q9: Suicide Ideation Not at all Not at all Not at all     NATALIE-7 SCORE 12/13/2017 7/26/2018 11/29/2018   Total Score - - -   Total Score - 5 (mild anxiety) -   Total Score 8 5 10       Hypertension Follow-up   Current medication regimen includes amlodipine 5 mg QD.   Patient reports no problems or side effects with the medication.  Patient does not check blood pressure outside of clinic.          Problem list and histories reviewed & updated, as indicated.  Patient Active Problem List   Diagnosis     Allergic rhinitis     Mild recurrent major depression (H)     Eczema of eyelid     Anxiety disorder     Essential hypertension with goal blood pressure less than 140/90       BP Readings from Last 3 Encounters:   11/29/18 139/86   08/14/18 (!) 177/104   07/26/18 135/83    Wt Readings from Last 3 Encounters:   11/29/18 191 lb (86.6 kg)   08/14/18 198 lb (89.8 kg)   07/26/18 204 lb (92.5 kg)                    ROS:  12 point ROS of systems including Constitutional, Eyes, ENT, Respiratory, Cardiovascular, Gastroenterology, Genitourinary, Integumentary, Muscularskeletal,  Neurologic, Psychiatric, and Hematologic/Lymphatic were all negative except for pertinent positives noted in my HPI.     OBJECTIVE:    /86  Pulse 72  Temp 97.2  F (36.2  C) (Oral)  Resp 16  Wt 191 lb (86.6 kg)  SpO2 98%  BMI 32.03 kg/m2  GENERAL: No apparent distress   HEAD: Normocephalic.  EYES:  No discharge or erythema.   NOSE: Normal without discharge.  SKIN: Clear. No significant rash, abnormal pigmentation or lesions  NEUROLOGIC: No focal findings. Cranial nerves grossly intact. No tics or tremors. Normal gait, strength and tone    PSYCH:    Appearance: appropriately and casually dressed    Eye Contact: good  Behavior: calm  Attitude: cooperative    Speech:  normal rate, tone, latency, and volume    Thought Form: organized    Thought Content: no evidence of psychotic thought    Mood: euthymic    Affect: mood congruent    Insight: good       ASSESSMENT/PLAN:  1. NATALIE (generalized anxiety disorder)  Adequate control - anticipate improvement in anxiety once she settles into her new job.  [place] Prescription Monitoring Program website checked and no concerning activity so I did renew her lorazepam script.    - FLUoxetine (PROZAC) 20 MG capsule; Take 2 capsules (40 mg) by mouth daily  Dispense: 180 capsule; Refill: 1  - LORazepam (ATIVAN) 0.5 MG tablet; Take 1 tablet (0.5 mg) by mouth daily as needed for anxiety  Dispense: 60 tablet; Refill: 0    2. Major depressive disorder, recurrent episode, in full remission (H)  stable/well controlled  - FLUoxetine (PROZAC) 20 MG capsule; Take 2 capsules (40 mg) by mouth daily  Dispense: 180 capsule; Refill: 1    3. Essential hypertension with goal blood pressure less than 140/90  Adequate control.  She is considering obtaining a home blood pressure monitor.  Discussed that we could consider increasing amlodipine dose if blood pressures are > 140/90.  - amLODIPine (NORVASC) 5 MG tablet; TAKE 1 TABLET(5 MG) BY MOUTH DAILY  Dispense: 90 tablet; Refill: 1     Return to  Clinic in 6 months.    Patient Instructions   Check with your insurance on the coverage of Shingrix (new shingles vaccine).   The Shingrix vaccination is a 2-shot series.  The second dose is given 6 months after the first.  (It cannot be given sooner than 2 months after the first dose - at the earliest.)  You should be aware that patients are reporting feeling a bit under the weather after the injection, including fatigue, malaise, and low-grade fever that may last a couple days.  Rarely patients can get a mild, shingles-like rash.   But these symptoms are much better than the Shingles disease.         Doreen Aguila MD   [facility]

## 2018-11-29 ENCOUNTER — OFFICE VISIT (OUTPATIENT)
Dept: FAMILY MEDICINE | Facility: CLINIC | Age: 53
End: 2018-11-29
Payer: COMMERCIAL

## 2018-11-29 VITALS
TEMPERATURE: 97.2 F | RESPIRATION RATE: 16 BRPM | SYSTOLIC BLOOD PRESSURE: 139 MMHG | WEIGHT: 191 LBS | BODY MASS INDEX: 32.03 KG/M2 | DIASTOLIC BLOOD PRESSURE: 86 MMHG | OXYGEN SATURATION: 98 % | HEART RATE: 72 BPM

## 2018-11-29 DIAGNOSIS — F41.1 GAD (GENERALIZED ANXIETY DISORDER): Primary | ICD-10-CM

## 2018-11-29 DIAGNOSIS — I10 ESSENTIAL HYPERTENSION WITH GOAL BLOOD PRESSURE LESS THAN 140/90: ICD-10-CM

## 2018-11-29 DIAGNOSIS — F33.42 MAJOR DEPRESSIVE DISORDER, RECURRENT EPISODE, IN FULL REMISSION (H): ICD-10-CM

## 2018-11-29 PROCEDURE — 99214 OFFICE O/P EST MOD 30 MIN: CPT | Performed by: FAMILY MEDICINE

## 2018-11-29 RX ORDER — AMLODIPINE BESYLATE 5 MG/1
TABLET ORAL
Qty: 90 TABLET | Refills: 1 | Status: SHIPPED | OUTPATIENT
Start: 2018-11-29 | End: 2019-04-06

## 2018-11-29 RX ORDER — LORAZEPAM 0.5 MG/1
0.5 TABLET ORAL DAILY PRN
Qty: 60 TABLET | Refills: 0 | Status: SHIPPED | OUTPATIENT
Start: 2018-11-29 | End: 2019-04-08

## 2018-11-29 ASSESSMENT — ANXIETY QUESTIONNAIRES
5. BEING SO RESTLESS THAT IT IS HARD TO SIT STILL: SEVERAL DAYS
GAD7 TOTAL SCORE: 10
2. NOT BEING ABLE TO STOP OR CONTROL WORRYING: MORE THAN HALF THE DAYS
7. FEELING AFRAID AS IF SOMETHING AWFUL MIGHT HAPPEN: NOT AT ALL
1. FEELING NERVOUS, ANXIOUS, OR ON EDGE: NEARLY EVERY DAY
3. WORRYING TOO MUCH ABOUT DIFFERENT THINGS: SEVERAL DAYS
IF YOU CHECKED OFF ANY PROBLEMS ON THIS QUESTIONNAIRE, HOW DIFFICULT HAVE THESE PROBLEMS MADE IT FOR YOU TO DO YOUR WORK, TAKE CARE OF THINGS AT HOME, OR GET ALONG WITH OTHER PEOPLE: SOMEWHAT DIFFICULT
6. BECOMING EASILY ANNOYED OR IRRITABLE: NOT AT ALL

## 2018-11-29 ASSESSMENT — PATIENT HEALTH QUESTIONNAIRE - PHQ9
SUM OF ALL RESPONSES TO PHQ QUESTIONS 1-9: 4
5. POOR APPETITE OR OVEREATING: NEARLY EVERY DAY

## 2018-11-29 NOTE — MR AVS SNAPSHOT
After Visit Summary   11/29/2018    Joan Lam    MRN: [id number]           Patient Information     Date Of Birth          1965        Visit Information        Provider Department      11/29/2018 3:40 PM Doreen Aguila MD [facility]        Today's Diagnoses     Screening for malignant neoplasm of cervix    -  1    NATALIE (generalized anxiety disorder)        Major depressive disorder, recurrent episode, in full remission (H)        Essential hypertension with goal blood pressure less than 140/90          Care Instructions    Check with your insurance on the coverage of Shingrix (new shingles vaccine).   The Shingrix vaccination is a 2-shot series.  The second dose is given 6 months after the first.  (It cannot be given sooner than 2 months after the first dose - at the earliest.)  You should be aware that patients are reporting feeling a bit under the weather after the injection, including fatigue, malaise, and low-grade fever that may last a couple days.  Rarely patients can get a mild, shingles-like rash.   But these symptoms are much better than the Shingles disease.             Follow-ups after your visit        Follow-up notes from your care team     Return in about 6 months (around 5/29/2019) for Follow-up chronic condition(s).      Who to contact     If you have questions or need follow up information about today's clinic visit or your schedule please contact [facility] directly at [phone].  Normal or non-critical lab and imaging results will be communicated to you by MyChart, letter or phone within 4 business days after the clinic has received the results. If you do not hear from us within 7 days, please contact the clinic through MyChart or phone. If you have a critical or abnormal lab result, we will notify you by phone as soon as possible.  Submit refill requests through [email or web address] or call your pharmacy and they will forward the refill request to us.  Please allow 3 business days for your refill to be completed.          Additional Information About Your Visit        MyChart Information     [employer] gives you secure access to your electronic health record. If you see a primary care provider, you can also send messages to your care team and make appointments. If you have questions, please call your primary care clinic.  If you do not have a primary care provider, please call [phone] and they will assist you.        Care EveryWhere ID     This is your Care EveryWhere ID. This could be used by other organizations to access your [facility] medical records  [id number]        Your Vitals Were     Pulse Temperature Respirations Pulse Oximetry BMI (Body Mass Index)       72 97.2  F (36.2  C) (Oral) 16 98% 32.03 kg/m2        Blood Pressure from Last 3 Encounters:   11/29/18 139/86   08/14/18 (!) 177/104   07/26/18 135/83    Weight from Last 3 Encounters:   11/29/18 191 lb (86.6 kg)   08/14/18 198 lb (89.8 kg)   07/26/18 204 lb (92.5 kg)              Today, you had the following     No orders found for display         Today's Medication Changes          These changes are accurate as of 11/29/18  4:30 PM.  If you have any questions, ask your nurse or doctor.               These medicines have changed or have updated prescriptions.        Dose/Directions    LORazepam 0.5 MG tablet   Commonly known as:  ATIVAN   This may have changed:  reasons to take this   Used for:  NATALIE (generalized anxiety disorder)   Changed by:  Doreen Aguila MD        Dose:  0.5 mg   Take 1 tablet (0.5 mg) by mouth daily as needed for anxiety   Quantity:  60 tablet   Refills:  0            Where to get your medicines      These medications were sent to [facility] [address] [address] AT [address] [address]     Phone:  [phone]     amLODIPine 5 MG tablet    FLUoxetine 20 MG capsule         Some of these will need a paper  prescription and others can be bought over the counter.  Ask your nurse if you have questions.     Bring a paper prescription for each of these medications     LORazepam 0.5 MG tablet                Primary Care Provider Office Phone # Fax #    Doreen Aguila [phone] [address]  [facility] [place] [address]        Equal Access to Services     LAURA JACOBSON : Hadii aad ku hadasho Soomaali, waaxda luqadaha, qaybta kaalmada adeegyada, waxay idiin hayaan adeeg jaspreetlatashajuany lagisselle . So [facility] [phone].    ATENCIÓN: Si habla español, tiene a pérez disposición servicios gratuitos de asistencia lingüística. Darcyame al [phone].    We comply with applicable federal civil rights laws and [place] laws. We do not discriminate on the basis of race, color, national origin, age, disability, sex, sexual orientation, or gender identity.            Thank you!     Thank you for choosing [facility]  for your care. Our goal is always to provide you with excellent care. Hearing back from our patients is one way we can continue to improve our services. Please take a few minutes to complete the written survey that you may receive in the mail after your visit with us. Thank you!             Your Updated Medication List - Protect others around you: Learn how to safely use, store and throw away your medicines at www.disposemymeds.org.          This list is accurate as of 11/29/18  4:30 PM.  Always use your most recent med list.                   Brand Name Dispense Instructions for use Diagnosis    amLODIPine 5 MG tablet    NORVASC    90 tablet    TAKE 1 TABLET(5 MG) BY MOUTH DAILY    Essential hypertension with goal blood pressure less than 140/90       cetirizine 10 MG tablet    zyrTEC     Take 10 mg by mouth daily        desonide 0.05 % external ointment    DESOWEN    15 g    Apply topically 2 times daily as needed up to one week at a time.    Eczema of eyelid, unspecified laterality       FLUoxetine  20 MG capsule    PROzac    180 capsule    Take 2 capsules (40 mg) by mouth daily    NATALIE (generalized anxiety disorder), Major depressive disorder, recurrent episode, in full remission (H)       fluticasone 50 MCG/ACT nasal spray    FLONASE     Spray 2 sprays into both nostrils daily        levothyroxine 100 MCG tablet    SYNTHROID/LEVOTHROID     Take 1 tablet by mouth daily        LORazepam 0.5 MG tablet    ATIVAN    60 tablet    Take 1 tablet (0.5 mg) by mouth daily as needed for anxiety    NATALIE (generalized anxiety disorder)       progesterone 100 MG capsule    PROMETRIUM     Take 300 mg by mouth At Bedtime        vitamin D3 1000 units (25 mcg) tablet    CHOLECALCIFEROL     Take 3 tablets (3,000 Units) by mouth daily

## 2018-11-30 ASSESSMENT — ANXIETY QUESTIONNAIRES: GAD7 TOTAL SCORE: 10

## 2018-12-13 DIAGNOSIS — I10 ESSENTIAL HYPERTENSION WITH GOAL BLOOD PRESSURE LESS THAN 140/90: ICD-10-CM

## 2018-12-13 NOTE — TELEPHONE ENCOUNTER
"Requested Prescriptions   Pending Prescriptions Disp Refills     amLODIPine (NORVASC) 5 MG tablet [Pharmacy Med Name: AMLODIPINE BESYLATE 5MG TABLETS]  Last Written Prescription Date:  11/29/2018  Last Fill Quantity: 90 tablet,  # refills: 1   Last Office Visit: 11/29/2018   Future Office Visit:      90 tablet 0     Sig: TAKE 1 TABLET(5 MG) BY MOUTH DAILY    Calcium Channel Blockers Protocol  Passed - 12/13/2018  4:03 AM       Passed - Blood pressure under 140/90 in past 12 months    BP Readings from Last 3 Encounters:   11/29/18 139/86   08/14/18 (!) 177/104   07/26/18 135/83          Passed - Recent (12 mo) or future (30 days) visit within the authorizing provider's specialty    Patient had office visit in the last 12 months or has a visit in the next 30 days with authorizing provider or within the authorizing provider's specialty.  See \"Patient Info\" tab in inbasket, or \"Choose Columns\" in Meds & Orders section of the refill encounter.           Passed - Patient is age 18 or older       Passed - No active pregnancy on record       Passed - Normal serum creatinine on file in past 12 months    Recent Labs   Lab Test 04/10/18  1645   CR 0.69          Passed - No positive pregnancy test in past 12 months          "

## 2018-12-14 RX ORDER — AMLODIPINE BESYLATE 5 MG/1
TABLET ORAL
Qty: 90 TABLET | Refills: 2 | Status: SHIPPED | OUTPATIENT
Start: 2018-12-14 | End: 2019-04-16

## 2019-01-23 ENCOUNTER — OFFICE VISIT (OUTPATIENT)
Dept: OBGYN | Facility: CLINIC | Age: 54
End: 2019-01-23
Payer: COMMERCIAL

## 2019-01-23 VITALS
BODY MASS INDEX: 31.42 KG/M2 | WEIGHT: 195.5 LBS | HEART RATE: 76 BPM | DIASTOLIC BLOOD PRESSURE: 86 MMHG | OXYGEN SATURATION: 97 % | SYSTOLIC BLOOD PRESSURE: 156 MMHG | HEIGHT: 66 IN

## 2019-01-23 DIAGNOSIS — N85.01 SIMPLE ENDOMETRIAL HYPERPLASIA: ICD-10-CM

## 2019-01-23 DIAGNOSIS — Z12.4 SCREENING FOR MALIGNANT NEOPLASM OF CERVIX: ICD-10-CM

## 2019-01-23 DIAGNOSIS — Z01.419 ENCOUNTER FOR GYNECOLOGICAL EXAMINATION WITHOUT ABNORMAL FINDING: Primary | ICD-10-CM

## 2019-01-23 PROCEDURE — 58100 BIOPSY OF UTERUS LINING: CPT | Performed by: OBSTETRICS & GYNECOLOGY

## 2019-01-23 PROCEDURE — 99213 OFFICE O/P EST LOW 20 MIN: CPT | Mod: 25 | Performed by: OBSTETRICS & GYNECOLOGY

## 2019-01-23 PROCEDURE — 88305 TISSUE EXAM BY PATHOLOGIST: CPT | Performed by: OBSTETRICS & GYNECOLOGY

## 2019-01-23 PROCEDURE — 99396 PREV VISIT EST AGE 40-64: CPT | Mod: 25 | Performed by: OBSTETRICS & GYNECOLOGY

## 2019-01-23 PROCEDURE — 87624 HPV HI-RISK TYP POOLED RSLT: CPT | Performed by: OBSTETRICS & GYNECOLOGY

## 2019-01-23 PROCEDURE — G0145 SCR C/V CYTO,THINLAYER,RESCR: HCPCS | Performed by: OBSTETRICS & GYNECOLOGY

## 2019-01-23 ASSESSMENT — MIFFLIN-ST. JEOR: SCORE: 1510.59

## 2019-01-23 NOTE — PROGRESS NOTES
Joan Lam is a 53 year old  female who presents for annual exam.  History of simple endometrial hyperplasia, biopsy 18.  She had been taking Prometrium intermittently at [facility], began taking it consistently after the biopsy results were known.  She currently is taking 300 mg daily.  She had a bleeding episode in early January which seemed like a normal menstrual period.  I recommended endometrial biopsy and she agreed.    Informed consent was obtained for endometrial biopsy.  Using standard technique and hibiclens prep of the cervix and vagina, pipelle aspiration of the endometrium was obtained.  The cervix was grasped anteriorly with a tenaculum.  The uterus sounded to 7 cm.  Scant tissue was obtained, placed in a container with formalin, labeled, and submitted to pathology.    The patient tolerated the procedure well.  Blood loss was less than one cc.  Standard post-biopsy precautions were given.    PLAN:  Call for biopsy results in 1 week.  Will re-evaluate depending on findings.  If benign, she may observe.        Menses are irregular and normal lasting 4 days.  Menses flow: normal.  Patient's last menstrual period was 2019.. Using none for contraception.  She is not currently considering pregnancy.  Besides routine health maintenance, she has no other health concerns today .  GYNECOLOGIC HISTORY:  Menarche:     Joan is  Not sexually active with male partner(s) and is not currently in monogamous relationship with 0.    History sexually transmitted infections:No STD history  STI testing offered?  Declined  KAITLIN exposure: No  History of abnormal Pap smear: NO - age 30- 65 PAP every 3 years recommended  Family history of breast CA: Yes (Please explain): mother and m, aaunt  Family history of uterine/ovarian CA: No    Family history of colon CA: m grandmother    HEALTH MAINTENANCE:  Cholesterol: (  Cholesterol   Date Value Ref Range Status    2013 142 115 - 199 mg/dL Final    History of abnormal lipids: No  Mammo:  20. History of abnormal Mammo: Not applicable.  Regular Self Breast Exams: Yes  Calcium/Vitamin D intake: source:  dairy, dietary supplement(s) Adequate? Yes  TSH: (  TSH   Date Value Ref Range Status   04/10/2018 0.97 0.40 - 4.00 mU/L Final    )  Pap; (No results found for: PAP )    HISTORY:  Obstetric History       T0      L0     SAB0   TAB0   Ectopic0   Multiple0   Live Births0         Past Medical History:   Diagnosis Date     Allergic rhinitis 2005     Anxiety disorder     Has Panic Attacks     Depressive disorder     and NATALIE. Taking Fluoxetine (40mg)     [facility] of prolactinoma     [facility]     Hypertension 2016    As of late , now taking Amlopidine     Mild recurrent major depression (H) 2005     Thyroid disease 2014    Hypo-thyroid. Taking Levothyroxine     Unspecified contraceptive management      Past Surgical History:   Procedure Laterality Date     BIOPSY  mid-90's    from cervix after abnormal Pap. Benign.     BUNIONECTOMY RT/LT      Right Foot     BUNIONECTOMY RT/LT      Left Foot      COLONOSCOPY  January 15, 2018    1 polyp - benign. Next exam in 10 years.     Family History   Problem Relation Age of Onset     Hypertension Father      Breast Cancer Mother         Cancer-free for 2 years     Cerebrovascular Disease Maternal Grandmother      Breast Cancer Maternal Grandmother      Colon Cancer Maternal Grandmother      Substance Abuse Maternal Grandfather      Anxiety Disorder Paternal Grandmother      Depression Paternal Grandfather      Breast Cancer Other         Mat. Aunt - cancer free now     Substance Abuse Brother      Social History     Socioeconomic History     Marital status: Single     Spouse name: Single     Number of children: 0     Years of education: None     Highest education level: None   Social Needs     Financial  resource strain: None     Food insecurity - worry: None     Food insecurity - inability: None     Transportation needs - medical: None     Transportation needs - non-medical: None   Occupational History     Occupation:      Employer: [employer]   Tobacco Use     Smoking status: Never Smoker     Smokeless tobacco: Never Used   Substance and Sexual Activity     Alcohol use: Yes     Comment: Less than 4 servings (drinks) per month     Drug use: No     Sexual activity: No   Other Topics Concern     Parent/sibling w/ CABG, MI or angioplasty before 65F 55M? No   Social History Narrative     None       Current Outpatient Medications:      amLODIPine (NORVASC) 5 MG tablet, TAKE 1 TABLET(5 MG) BY MOUTH DAILY, Disp: 90 tablet, Rfl: 2     amLODIPine (NORVASC) 5 MG tablet, TAKE 1 TABLET(5 MG) BY MOUTH DAILY, Disp: 90 tablet, Rfl: 1     cetirizine (ZYRTEC) 10 MG tablet, Take 10 mg by mouth daily, Disp: , Rfl:      cholecalciferol (VITAMIN D3) 1000 UNIT tablet, Take 3 tablets (3,000 Units) by mouth daily, Disp: , Rfl:      desonide (DESOWEN) 0.05 % ointment, Apply topically 2 times daily as needed up to one week at a time., Disp: 15 g, Rfl: 2     FLUoxetine (PROZAC) 20 MG capsule, Take 2 capsules (40 mg) by mouth daily, Disp: 180 capsule, Rfl: 1     fluticasone (FLONASE) 50 MCG/ACT nasal spray, Spray 2 sprays into both nostrils daily, Disp: , Rfl:      levothyroxine (SYNTHROID,LEVOTHROID) 100 MCG tablet, Take 1 tablet by mouth daily, Disp: , Rfl:      progesterone (PROMETRIUM) 100 MG capsule, Take 300 mg by mouth At Bedtime, Disp: , Rfl:      LORazepam (ATIVAN) 0.5 MG tablet, Take 1 tablet (0.5 mg) by mouth daily as needed for anxiety (Patient not taking: Reported on 1/23/2019), Disp: 60 tablet, Rfl: 0     Allergies   Allergen Reactions     No Known Drug Allergies        Past medical, surgical, social and family history were reviewed and updated in EPIC.    ROS:   C:     NEGATIVE for fever, chills,  "change in weight  I:       NEGATIVE for worrisome rashes, moles or lesions  E:     NEGATIVE for vision changes or irritation  E/M: NEGATIVE for ear, mouth and throat problems  R:     NEGATIVE for significant cough or SOB  CV:   NEGATIVE for chest pain, palpitations or peripheral edema  GI:     NEGATIVE for nausea, abdominal pain, heartburn, or change in bowel habits  :   NEGATIVE for frequency, dysuria, hematuria, vaginal discharge, or irregular bleeding  M:     NEGATIVE for significant arthralgias or myalgia  N:      NEGATIVE for weakness, dizziness or paresthesias  E:      NEGATIVE for temperature intolerance, skin/hair changes  P:      NEGATIVE for changes in mood or affect.    EXAM:  /86   Pulse 76   Ht 1.664 m (5' 5.5\")   Wt 88.7 kg (195 lb 8 oz)   LMP 01/09/2019   SpO2 97%   Breastfeeding? No   BMI 32.04 kg/m     BMI: Body mass index is 32.04 kg/m .  Constitutional: healthy, alert and no distress  Head: Normocephalic. No masses, lesions, tenderness or abnormalities  Neck: Neck supple. Trachea midline. No adenopathy. Thyroid symmetric, normal size.   Cardiovascular: RRR.   Respiratory: Negative.   Breast: No nodularity, asymmetry or nipple discharge bilaterally.  Gastrointestinal: Abdomen soft, non-tender, non-distended. No masses, organomegaly.  :  Vulva:  No external lesions, normal female hair distribution, no inguinal adenopathy.    Urethra:  Midline, non-tender, well supported, no discharge  Vagina:  Moist, pink, no abnormal discharge, no lesions  Uterus:  Normal size, non-tender, freely mobile  Ovaries:  No masses appreciated, non-tender, mobile  Rectal Exam: deferred  Musculoskeletal: extremities normal  Skin: no suspicious lesions or rashes  Psychiatric: Affect appropriate, cooperative,mentation appears normal.     COUNSELING:      reports that  has never smoked. she has never used smokeless tobacco.    Body mass index is 32.04 kg/m .  Weight management plan: Diet and exercise  FRAX " Risk Assessment    ASSESSMENT:  53 year old female with satisfactory annual exam  (Z01.419) Encounter for gynecological examination without abnormal finding  (primary encounter diagnosis)  Comment:   Plan:     (Z12.4) Screening for malignant neoplasm of cervix  Comment:   Plan: Pap imaged thin layer screen with HPV -         recommended age 30 - 65 years (select HPV order        below), HPV High Risk Types DNA Cervical            (N85.01) Simple endometrial hyperplasia  Comment:   Plan: ENDOMETRIAL BIOPSY W/O CERVICAL DILATION,         Surgical pathology exam

## 2019-01-23 NOTE — NURSING NOTE
"Chief Complaint   Patient presents with     Physical       Initial /86   Pulse 76   Ht 1.664 m (5' 5.5\")   Wt 88.7 kg (195 lb 8 oz)   LMP 2019   SpO2 97%   Breastfeeding? No   BMI 32.04 kg/m   Estimated body mass index is 32.04 kg/m  as calculated from the following:    Height as of this encounter: 1.664 m (5' 5.5\").    Weight as of this encounter: 88.7 kg (195 lb 8 oz).  BP completed using cuff size: regular    Questioned patient about current smoking habits.  Pt. has never smoked.          The following HM Due: pap smear      The following patient reported/Care Every where data was sent to:  P ABSTRACT QUALITY INITIATIVES [[id number]]  none      n/a              "

## 2019-01-25 LAB
COPATH REPORT: NORMAL
PAP: NORMAL

## 2019-01-26 LAB — COPATH REPORT: NORMAL

## 2019-01-28 LAB
FINAL DIAGNOSIS: ABNORMAL
HPV HR 12 DNA CVX QL NAA+PROBE: POSITIVE
HPV16 DNA SPEC QL NAA+PROBE: NEGATIVE
HPV18 DNA SPEC QL NAA+PROBE: NEGATIVE
SPECIMEN DESCRIPTION: ABNORMAL
SPECIMEN SOURCE CVX/VAG CYTO: ABNORMAL

## 2019-01-29 PROBLEM — R87.810 CERVICAL HIGH RISK HPV (HUMAN PAPILLOMAVIRUS) TEST POSITIVE: Status: ACTIVE | Noted: 2019-01-23

## 2019-04-06 NOTE — PROGRESS NOTES
SUBJECTIVE:  Joan Lam, a 54 year old female, is here to discuss the following issues:     Hypertension Follow-up    She continues to take amlodipine 5 mg QD with no problems or noted side effects.    Outpatient blood pressures are being checked at home.  Results are 140's/90's.    Low Salt Diet: no added salt    Depression and Anxiety Follow-Up    She continues to take fluoxetine 40 mg QD and lorazepam 0.5 mg prn with no problems or noted side effects.  I last prescribed lorazepam for her on 11/29/18 for #60 tabs.      Status since last visit: No change    Had a hard winter but overall feels she is doing alright.  Likes her job.  Walks her dog.      Other associated symptoms: None    Current substance abuse: None    PHQ 7/26/2018 11/29/2018 4/8/2019   PHQ-9 Total Score 1 4 3   Q9: Thoughts of better off dead/self-harm past 2 weeks Not at all Not at all Not at all     NATALIE-7 SCORE 7/26/2018 11/29/2018 4/8/2019   Total Score - - -   Total Score 5 (mild anxiety) - -   Total Score 5 10 4         Problem list and histories reviewed & updated, as indicated.  Patient Active Problem List   Diagnosis     Allergic rhinitis     Mild recurrent major depression (H)     Eczema of eyelid     Anxiety disorder     Essential hypertension with goal blood pressure less than 140/90     Cervical high risk HPV (human papillomavirus) test positive       BP Readings from Last 3 Encounters:   04/08/19 130/84   01/23/19 156/86   11/29/18 139/86    Wt Readings from Last 3 Encounters:   04/08/19 89.6 kg (197 lb 8 oz)   01/23/19 88.7 kg (195 lb 8 oz)   11/29/18 86.6 kg (191 lb)            ROS:  RESP: NEGATIVE for shortness of breath  CV: NEGATIVE for chest pain    OBJECTIVE:    /84 (BP Location: Right arm, Patient Position: Sitting, Cuff Size: Adult Large)   Pulse 77   Temp 98.4  F (36.9  C) (Oral)   Wt 89.6 kg (197 lb 8 oz)   SpO2 97%   BMI 32.37 kg/m    GENERAL: No apparent distress   HEAD: Normocephalic.  EYES:  No discharge  or erythema.   NOSE: Normal without discharge.  LUNGS: Normal respiratory effort.  Clear to auscultation - no rales, rhonchi or wheezing.  HEART: Regular rhythm. Normal S1/S2. No murmurs. No edema of lower extremities.  SKIN: Clear. No significant rash, abnormal pigmentation or lesions  NEUROLOGIC: No focal findings. Cranial nerves grossly intact. Normal gait, strength and tone      ASSESSMENT/PLAN:  1. Major depressive disorder, recurrent episode, in full remission (H)  2. NATALIE (generalized anxiety disorder)  stable/well controlled   - LORazepam (ATIVAN) 0.5 MG tablet; Take 1 tablet (0.5 mg) by mouth daily as needed for anxiety  Dispense: 60 tablet; Refill: 0  - FLUoxetine (PROZAC) 20 MG capsule; Take 2 capsules (40 mg) by mouth daily  Dispense: 180 capsule; Refill: 1    3. Essential hypertension with goal blood pressure less than 140/90  stable/well controlled by our reading but reported high home readings.  I recommended she have her home blood pressure monitor checked against clinic sphygmomanometer.     Return to Clinic in 6 months.    Patient Instructions   Schedule a Medical Assistant (MA)-only appointment and bring in your home blood pressure monitor so we can check it against clinic reading.        Doreen Aguila MD   [facility]

## 2019-04-08 ENCOUNTER — OFFICE VISIT (OUTPATIENT)
Dept: FAMILY MEDICINE | Facility: CLINIC | Age: 54
End: 2019-04-08
Payer: COMMERCIAL

## 2019-04-08 VITALS
TEMPERATURE: 98.4 F | OXYGEN SATURATION: 97 % | HEART RATE: 77 BPM | BODY MASS INDEX: 32.37 KG/M2 | DIASTOLIC BLOOD PRESSURE: 84 MMHG | SYSTOLIC BLOOD PRESSURE: 130 MMHG | WEIGHT: 197.5 LBS

## 2019-04-08 DIAGNOSIS — F33.42 MAJOR DEPRESSIVE DISORDER, RECURRENT EPISODE, IN FULL REMISSION (H): ICD-10-CM

## 2019-04-08 DIAGNOSIS — I10 ESSENTIAL HYPERTENSION WITH GOAL BLOOD PRESSURE LESS THAN 140/90: ICD-10-CM

## 2019-04-08 DIAGNOSIS — F41.1 GAD (GENERALIZED ANXIETY DISORDER): ICD-10-CM

## 2019-04-08 PROCEDURE — 99214 OFFICE O/P EST MOD 30 MIN: CPT | Performed by: FAMILY MEDICINE

## 2019-04-08 RX ORDER — LORAZEPAM 0.5 MG/1
0.5 TABLET ORAL DAILY PRN
Qty: 60 TABLET | Refills: 0 | Status: SHIPPED | OUTPATIENT
Start: 2019-04-08 | End: 2019-07-10

## 2019-04-08 ASSESSMENT — PATIENT HEALTH QUESTIONNAIRE - PHQ9
5. POOR APPETITE OR OVEREATING: MORE THAN HALF THE DAYS
SUM OF ALL RESPONSES TO PHQ QUESTIONS 1-9: 3

## 2019-04-08 ASSESSMENT — ANXIETY QUESTIONNAIRES
7. FEELING AFRAID AS IF SOMETHING AWFUL MIGHT HAPPEN: NOT AT ALL
2. NOT BEING ABLE TO STOP OR CONTROL WORRYING: SEVERAL DAYS
GAD7 TOTAL SCORE: 4
IF YOU CHECKED OFF ANY PROBLEMS ON THIS QUESTIONNAIRE, HOW DIFFICULT HAVE THESE PROBLEMS MADE IT FOR YOU TO DO YOUR WORK, TAKE CARE OF THINGS AT HOME, OR GET ALONG WITH OTHER PEOPLE: SOMEWHAT DIFFICULT
5. BEING SO RESTLESS THAT IT IS HARD TO SIT STILL: NOT AT ALL
6. BECOMING EASILY ANNOYED OR IRRITABLE: NOT AT ALL
1. FEELING NERVOUS, ANXIOUS, OR ON EDGE: SEVERAL DAYS
3. WORRYING TOO MUCH ABOUT DIFFERENT THINGS: NOT AT ALL

## 2019-04-08 NOTE — PATIENT INSTRUCTIONS
Schedule a Medical Assistant (MA)-only appointment and bring in your home blood pressure monitor so we can check it against clinic reading.

## 2019-04-09 ASSESSMENT — ANXIETY QUESTIONNAIRES: GAD7 TOTAL SCORE: 4

## 2019-04-16 DIAGNOSIS — I10 ESSENTIAL HYPERTENSION WITH GOAL BLOOD PRESSURE LESS THAN 140/90: ICD-10-CM

## 2019-04-16 NOTE — TELEPHONE ENCOUNTER
"Requested Prescriptions   Pending Prescriptions Disp Refills     amLODIPine (NORVASC) 5 MG tablet  Last Written Prescription Date:  12/14/2018  Last Fill Quantity: 90 tablet,  # refills: 2   Last Office Visit: 4/8/2019   Future Office Visit:      90 tablet 2       Calcium Channel Blockers Protocol  Failed - 4/16/2019  4:20 PM        Failed - Normal serum creatinine on file in past 12 months     Recent Labs   Lab Test 04/10/18  1645   CR 0.69           Passed - Blood pressure under 140/90 in past 12 months     BP Readings from Last 3 Encounters:   04/08/19 130/84   01/23/19 156/86   11/29/18 139/86           Passed - Recent (12 mo) or future (30 days) visit within the authorizing provider's specialty     Patient had office visit in the last 12 months or has a visit in the next 30 days with authorizing provider or within the authorizing provider's specialty.  See \"Patient Info\" tab in inbasket, or \"Choose Columns\" in Meds & Orders section of the refill encounter.            Passed - Medication is active on med list        Passed - Patient is age 18 or older        Passed - No active pregnancy on record        Passed - No positive pregnancy test in past 12 months          "

## 2019-04-18 RX ORDER — AMLODIPINE BESYLATE 5 MG/1
TABLET ORAL
Qty: 90 TABLET | Refills: 1 | Status: SHIPPED | OUTPATIENT
Start: 2019-04-18 | End: 2019-09-08

## 2019-05-14 ENCOUNTER — MYC MEDICAL ADVICE (OUTPATIENT)
Dept: FAMILY MEDICINE | Facility: CLINIC | Age: 54
End: 2019-05-14

## 2019-05-14 DIAGNOSIS — Z13.220 LIPID SCREENING: ICD-10-CM

## 2019-05-14 DIAGNOSIS — E03.9 HYPOTHYROIDISM, UNSPECIFIED TYPE: ICD-10-CM

## 2019-05-14 DIAGNOSIS — I10 ESSENTIAL HYPERTENSION WITH GOAL BLOOD PRESSURE LESS THAN 140/90: Primary | ICD-10-CM

## 2019-05-14 NOTE — TELEPHONE ENCOUNTER
Dr Aguila are you willing to assume this prescription for patient?  Patient had been seeing endo.  Is due for annual TSH  Would you need to see her in clinic?  Lab only?  Eli Willams RN

## 2019-05-15 NOTE — TELEPHONE ENCOUNTER
Lipid panel, TSH and BMP, ordered per Dr. Aguila.    Writer responded as per below.    TIFFANY AshbyN, RN

## 2019-05-15 NOTE — TELEPHONE ENCOUNTER
I think lab-only is ok.  Please order all of the labs on her HM and instruct her to fast for a lab-only appointment.

## 2019-05-20 DIAGNOSIS — N85.01 ENDOMETRIAL HYPERPLASIA, SIMPLE: Primary | ICD-10-CM

## 2019-05-20 NOTE — TELEPHONE ENCOUNTER
Reason for Call:  Medication or medication refill:    Do you use a [facility]?  Name of the pharmacy and phone number for the current request:  [facility] on [address] [facility] - [phone]    Name of the medication requested: progesterone (PROMETRIUM) 100 MG capsule [[id number]      Other request: none    Can we leave a detailed message on this number? YES    Phone number patient can be reached at: Home number on file [phone] (home)    Best Time: asap    Call taken on 5/20/2019 at 10:39 AM by Becki Cline

## 2019-05-20 NOTE — TELEPHONE ENCOUNTER
Routing refill request to provider for review/approval because:  Medication is reported/historical  Dose warning firing.  Eli Willams RN

## 2019-05-22 NOTE — TELEPHONE ENCOUNTER
Yes, it would be appropriate to continue the Prometrium.  I'd advise a slight reduction in dosage, to 200 mg daily.  Rx sent.

## 2019-05-22 NOTE — TELEPHONE ENCOUNTER
Patient is calling asking as to the status of her message because of the long weekend coming up. She would like to know asap so if she needs to contact other provider she will have enough time to contact them

## 2019-06-07 DIAGNOSIS — I10 ESSENTIAL HYPERTENSION WITH GOAL BLOOD PRESSURE LESS THAN 140/90: ICD-10-CM

## 2019-06-07 DIAGNOSIS — E03.9 HYPOTHYROIDISM, UNSPECIFIED TYPE: ICD-10-CM

## 2019-06-07 DIAGNOSIS — Z13.220 LIPID SCREENING: ICD-10-CM

## 2019-06-07 LAB
ANION GAP SERPL CALCULATED.3IONS-SCNC: 7 MMOL/L (ref 3–14)
BUN SERPL-MCNC: 11 MG/DL (ref 7–30)
CALCIUM SERPL-MCNC: 8.3 MG/DL (ref 8.5–10.1)
CHLORIDE SERPL-SCNC: 108 MMOL/L (ref 94–109)
CHOLEST SERPL-MCNC: 136 MG/DL
CO2 SERPL-SCNC: 26 MMOL/L (ref 20–32)
CREAT SERPL-MCNC: 0.72 MG/DL (ref 0.52–1.04)
GFR SERPL CREATININE-BSD FRML MDRD: >90 ML/MIN/{1.73_M2}
GLUCOSE SERPL-MCNC: 87 MG/DL (ref 70–99)
HDLC SERPL-MCNC: 37 MG/DL
LDLC SERPL CALC-MCNC: 63 MG/DL
NONHDLC SERPL-MCNC: 99 MG/DL
POTASSIUM SERPL-SCNC: 4 MMOL/L (ref 3.4–5.3)
SODIUM SERPL-SCNC: 141 MMOL/L (ref 133–144)
TRIGL SERPL-MCNC: 181 MG/DL
TSH SERPL DL<=0.005 MIU/L-ACNC: 0.98 MU/L (ref 0.4–4)

## 2019-06-07 PROCEDURE — 80048 BASIC METABOLIC PNL TOTAL CA: CPT | Performed by: FAMILY MEDICINE

## 2019-06-07 PROCEDURE — 84443 ASSAY THYROID STIM HORMONE: CPT | Performed by: FAMILY MEDICINE

## 2019-06-07 PROCEDURE — 36415 COLL VENOUS BLD VENIPUNCTURE: CPT | Performed by: FAMILY MEDICINE

## 2019-06-07 PROCEDURE — 80061 LIPID PANEL: CPT | Performed by: FAMILY MEDICINE

## 2019-06-07 RX ORDER — LEVOTHYROXINE SODIUM 100 UG/1
100 TABLET ORAL DAILY
Qty: 90 TABLET | Refills: 3 | Status: SHIPPED | OUTPATIENT
Start: 2019-06-07 | End: 2020-05-12

## 2019-06-07 NOTE — RESULT ENCOUNTER NOTE
Twan Franco,  Your TSH (thyroid function test) and basic metabolic panel results (blood salts, blood sugar, and kidney function) look good.  Your triglycerides are a bit high and your HDL (good) cholesterol is a bit low but otherwise your lipid results look good.      I sent authorization to your pharmacy to fill the levothyroxine at the same dose for another year.      Triglycerides can be lowered by cutting back on the amount of sugar, alcohol and sweets in your diet, increasing exercise, and with weight loss.  The best way to improve HDL (good) cholesterol is to increase your daily physical activity level (i.e., get more exercise - move your body more every day).    Doreen Aguila MD

## 2019-06-18 DIAGNOSIS — N85.01 ENDOMETRIAL HYPERPLASIA, SIMPLE: ICD-10-CM

## 2019-06-18 NOTE — TELEPHONE ENCOUNTER
[facility] sent new refill request for home delivery for Progesterone. Rx was sent on 5/22/19 for 180 tabs with 1 refill. Call pharmacy to see if pt picked up that prescription.   Rachael Grove RN-BSN

## 2019-06-19 NOTE — TELEPHONE ENCOUNTER
TC to pharmacy. Pt picked up one month on 5/25. Refill sent to mail order.   Rachael Grove RN-BSN

## 2019-07-05 NOTE — PROGRESS NOTES
Subjective     Joan Lam is a 54 year old female who presents to clinic today for the following health issues:    HPI   Anxiety Follow-Up    How are you doing with your anxiety since your last visit? Improved    Are you having other symptoms that might be associated with anxiety? No    Have you had a significant life event? No     Are you feeling depressed? No    Do you have any concerns with your use of alcohol or other drugs? No     She has settled into her new job at [employer].  She enjoys it and it is going well.  She had a stressful spring (with ice dams and water in basement) and did use the lorazepam more frequently but has cut back significantly in the past couple months.  Self-cares for anxiety include walking the dog and yoga.  She does not see a therapist currently but has in the past and has considered reconnecting with her therapist.      Social History     Tobacco Use     Smoking status: Never Smoker     Smokeless tobacco: Never Used   Substance Use Topics     Alcohol use: Yes     Comment: Less than 4 servings (drinks) per month     Drug use: No     NATALIE-7 SCORE 11/29/2018 4/8/2019 7/10/2019   Total Score - - -   Total Score - - 4 (minimal anxiety)   Total Score 10 4 4     PHQ 11/29/2018 4/8/2019 7/10/2019   PHQ-9 Total Score 4 3 1   Q9: Thoughts of better off dead/self-harm past 2 weeks Not at all Not at all Not at all         BP Readings from Last 3 Encounters:   07/10/19 128/87   04/08/19 130/84   01/23/19 156/86    Wt Readings from Last 3 Encounters:   07/10/19 91.2 kg (201 lb)   04/08/19 89.6 kg (197 lb 8 oz)   01/23/19 88.7 kg (195 lb 8 oz)              Reviewed and updated as needed this visit by Provider  Meds  Problems         Review of Systems   ROS COMP: Constitutional, HEENT, cardiovascular, pulmonary, GI, , musculoskeletal, neuro, skin, endocrine and psych systems are negative, except as otherwise noted and POSITIVE for seasonal allergies.      Objective    /87 (BP  Location: Left arm, Patient Position: Sitting, Cuff Size: Adult Large)   Pulse 72   Temp 98.2  F (36.8  C) (Oral)   Resp 16   Wt 91.2 kg (201 lb)   LMP 07/05/2019   SpO2 100%   Breastfeeding? No   BMI 32.94 kg/m    Body mass index is 32.94 kg/m .  Physical Exam   GEN:  no apparent distress  PSYCH:    Appearance: appropriately and casually dressed    Eye Contact: good  Behavior: calm  Attitude: cooperative    Speech:  normal rate, rhythm, and tone    Thought Form: organized    Thought Content: no evidence of psychotic thought    Mood: euthymic    Affect: mood congruent    Insight: good           Assessment & Plan       ICD-10-CM    1. NATALIE (generalized anxiety disorder) F41.1 LORazepam (ATIVAN) 0.5 MG tablet       Return in about 3 months (around 10/10/2019) for Follow-up medical condition(s).    Doreen Aguila MD  [facility] [place]

## 2019-07-10 ENCOUNTER — OFFICE VISIT (OUTPATIENT)
Dept: FAMILY MEDICINE | Facility: CLINIC | Age: 54
End: 2019-07-10
Payer: COMMERCIAL

## 2019-07-10 VITALS
TEMPERATURE: 98.2 F | OXYGEN SATURATION: 100 % | HEART RATE: 72 BPM | WEIGHT: 201 LBS | RESPIRATION RATE: 16 BRPM | SYSTOLIC BLOOD PRESSURE: 128 MMHG | BODY MASS INDEX: 32.94 KG/M2 | DIASTOLIC BLOOD PRESSURE: 87 MMHG

## 2019-07-10 DIAGNOSIS — F41.1 GAD (GENERALIZED ANXIETY DISORDER): ICD-10-CM

## 2019-07-10 PROCEDURE — 99213 OFFICE O/P EST LOW 20 MIN: CPT | Performed by: FAMILY MEDICINE

## 2019-07-10 RX ORDER — LORAZEPAM 0.5 MG/1
0.5 TABLET ORAL DAILY PRN
Qty: 60 TABLET | Refills: 0 | Status: SHIPPED | OUTPATIENT
Start: 2019-07-10 | End: 2020-01-30

## 2019-07-10 ASSESSMENT — ANXIETY QUESTIONNAIRES
GAD7 TOTAL SCORE: 4
3. WORRYING TOO MUCH ABOUT DIFFERENT THINGS: SEVERAL DAYS
6. BECOMING EASILY ANNOYED OR IRRITABLE: NOT AT ALL
7. FEELING AFRAID AS IF SOMETHING AWFUL MIGHT HAPPEN: NOT AT ALL
1. FEELING NERVOUS, ANXIOUS, OR ON EDGE: SEVERAL DAYS
4. TROUBLE RELAXING: SEVERAL DAYS
7. FEELING AFRAID AS IF SOMETHING AWFUL MIGHT HAPPEN: NOT AT ALL
GAD7 TOTAL SCORE: 4
2. NOT BEING ABLE TO STOP OR CONTROL WORRYING: SEVERAL DAYS
GAD7 TOTAL SCORE: 4
5. BEING SO RESTLESS THAT IT IS HARD TO SIT STILL: NOT AT ALL

## 2019-07-10 ASSESSMENT — PATIENT HEALTH QUESTIONNAIRE - PHQ9
SUM OF ALL RESPONSES TO PHQ QUESTIONS 1-9: 1
SUM OF ALL RESPONSES TO PHQ QUESTIONS 1-9: 1
10. IF YOU CHECKED OFF ANY PROBLEMS, HOW DIFFICULT HAVE THESE PROBLEMS MADE IT FOR YOU TO DO YOUR WORK, TAKE CARE OF THINGS AT HOME, OR GET ALONG WITH OTHER PEOPLE: NOT DIFFICULT AT ALL

## 2019-07-11 ASSESSMENT — PATIENT HEALTH QUESTIONNAIRE - PHQ9: SUM OF ALL RESPONSES TO PHQ QUESTIONS 1-9: 1

## 2019-07-11 ASSESSMENT — ANXIETY QUESTIONNAIRES: GAD7 TOTAL SCORE: 4

## 2019-09-02 ENCOUNTER — MYC REFILL (OUTPATIENT)
Dept: FAMILY MEDICINE | Facility: CLINIC | Age: 54
End: 2019-09-02

## 2019-09-02 DIAGNOSIS — H01.139 ECZEMA OF EYELID, UNSPECIFIED LATERALITY: ICD-10-CM

## 2019-09-03 NOTE — TELEPHONE ENCOUNTER
desonide (DESOWEN) 0.05 % external ointment      Last Written Prescription Date:  5/8/2017  Last Fill Quantity: 15g,   # refills: 2  Last Office Visit: 7/10/2019  Future Office visit:       Routing refill request to provider for review/approval because:  Drug not on the [facility], [facility] or [facility] refill protocol or controlled substance

## 2019-09-05 RX ORDER — DESONIDE 0.5 MG/G
OINTMENT TOPICAL 2 TIMES DAILY PRN
Qty: 15 G | Refills: 1 | Status: SHIPPED | OUTPATIENT
Start: 2019-09-05 | End: 2022-08-11

## 2019-09-05 NOTE — TELEPHONE ENCOUNTER
Dr. Aguila-Please review and sign if agree.    Medication not on refill protocol.    Thank you!  TIFFANY AshbyN, RN

## 2019-09-08 DIAGNOSIS — F33.42 MAJOR DEPRESSIVE DISORDER, RECURRENT EPISODE, IN FULL REMISSION (H): ICD-10-CM

## 2019-09-08 DIAGNOSIS — F41.1 GAD (GENERALIZED ANXIETY DISORDER): ICD-10-CM

## 2019-09-08 DIAGNOSIS — I10 ESSENTIAL HYPERTENSION WITH GOAL BLOOD PRESSURE LESS THAN 140/90: ICD-10-CM

## 2019-09-09 NOTE — TELEPHONE ENCOUNTER
"Requested Prescriptions   Pending Prescriptions Disp Refills     amLODIPine (NORVASC) 5 MG tablet [Pharmacy Med Name: AMLODIPINE  5MG  TAB] 90 tablet 1     Sig: TAKE 1 TABLET BY MOUTH  DAILY  Last Written Prescription Date:  4/18/2019  Last Fill Quantity: 90 tablet,  # refills: 1   Last Office Visit: 7/10/2019   Future Office Visit:            Calcium Channel Blockers Protocol  Passed - 9/8/2019  4:05 AM        Passed - Blood pressure under 140/90 in past 12 months     BP Readings from Last 3 Encounters:   07/10/19 128/87   04/08/19 130/84   01/23/19 156/86           Passed - Recent (12 mo) or future (30 days) visit within the authorizing provider's specialty     Patient had office visit in the last 12 months or has a visit in the next 30 days with authorizing provider or within the authorizing provider's specialty.  See \"Patient Info\" tab in inbasket, or \"Choose Columns\" in Meds & Orders section of the refill encounter.            Passed - Medication is active on med list        Passed - Patient is age 18 or older        Passed - No active pregnancy on record        Passed - Normal serum creatinine on file in past 12 months     Recent Labs   Lab Test 06/07/19  0733   CR 0.72           Passed - No positive pregnancy test in past 12 months     _________________________________________________________________       FLUoxetine (PROZAC) 20 MG capsule [Pharmacy Med Name: FLUOXETINE  20MG  CAP] 180 capsule 1     Sig: TAKE 2 CAPSULES BY MOUTH  DAILY  Last Written Prescription Date:  4/8/2019  Last Fill Quantity: 180 capsule,  # refills: 1   Last Office Visit: 7/10/2019   Future Office Visit:            SSRIs Protocol Passed - 9/8/2019  4:05 AM        Passed - PHQ-9 score less than 5 in past 6 months     Please review last PHQ-9 score.   PHQ-9 SCORE 11/29/2018 4/8/2019 7/10/2019   PHQ-9 Total Score - - -   PHQ-9 Total Score MyChart - - 1 (Minimal depression)   PHQ-9 Total Score 4 3 1     NATALIE-7 SCORE 11/29/2018 4/8/2019 " "7/10/2019   Total Score - - -   Total Score - - 4 (minimal anxiety)   Total Score 10 4 4           Passed - Medication is active on med list        Passed - Patient is age 18 or older        Passed - No active pregnancy on record        Passed - No positive pregnancy test in last 12 months        Passed - Recent (6 mo) or future (30 days) visit within the authorizing provider's specialty     Patient had office visit in the last 6 months or has a visit in the next 30 days with authorizing provider or within the authorizing provider's specialty.  See \"Patient Info\" tab in inbasket, or \"Choose Columns\" in Meds & Orders section of the refill encounter.              "

## 2019-09-10 RX ORDER — AMLODIPINE BESYLATE 5 MG/1
TABLET ORAL
Qty: 90 TABLET | Refills: 1 | Status: SHIPPED | OUTPATIENT
Start: 2019-09-10 | End: 2020-01-30

## 2019-10-14 DIAGNOSIS — N85.01 ENDOMETRIAL HYPERPLASIA, SIMPLE: ICD-10-CM

## 2019-10-14 NOTE — TELEPHONE ENCOUNTER
Signed Prescriptions:                        Disp   Refills    progesterone (PROMETRIUM) 100 MG capsule   180 ca*0        Sig: Take 2 capsules (200 mg) by mouth At Bedtime  Authorizing Provider: BORA KOROMA  Ordering User: DOREEN DHILLON    Up to date on AE - rx sent.  Doreen Dhillon RN

## 2019-10-21 ENCOUNTER — OFFICE VISIT (OUTPATIENT)
Dept: FAMILY MEDICINE | Facility: CLINIC | Age: 54
End: 2019-10-21
Payer: COMMERCIAL

## 2019-10-21 VITALS
OXYGEN SATURATION: 95 % | TEMPERATURE: 98.4 F | SYSTOLIC BLOOD PRESSURE: 130 MMHG | RESPIRATION RATE: 13 BRPM | BODY MASS INDEX: 33.43 KG/M2 | HEART RATE: 73 BPM | WEIGHT: 204 LBS | DIASTOLIC BLOOD PRESSURE: 88 MMHG

## 2019-10-21 DIAGNOSIS — I10 ESSENTIAL HYPERTENSION WITH GOAL BLOOD PRESSURE LESS THAN 140/90: ICD-10-CM

## 2019-10-21 DIAGNOSIS — F41.1 GAD (GENERALIZED ANXIETY DISORDER): ICD-10-CM

## 2019-10-21 DIAGNOSIS — R42 DIZZINESS: Primary | ICD-10-CM

## 2019-10-21 LAB
ALBUMIN SERPL-MCNC: 3.9 G/DL (ref 3.4–5)
ALP SERPL-CCNC: 95 U/L (ref 40–150)
ALT SERPL W P-5'-P-CCNC: 28 U/L (ref 0–50)
ANION GAP SERPL CALCULATED.3IONS-SCNC: 9 MMOL/L (ref 3–14)
AST SERPL W P-5'-P-CCNC: 14 U/L (ref 0–45)
BASOPHILS # BLD AUTO: 0 10E9/L (ref 0–0.2)
BASOPHILS NFR BLD AUTO: 0.3 %
BILIRUB SERPL-MCNC: 0.3 MG/DL (ref 0.2–1.3)
BUN SERPL-MCNC: 9 MG/DL (ref 7–30)
CALCIUM SERPL-MCNC: 8.6 MG/DL (ref 8.5–10.1)
CHLORIDE SERPL-SCNC: 106 MMOL/L (ref 94–109)
CO2 SERPL-SCNC: 23 MMOL/L (ref 20–32)
CREAT SERPL-MCNC: 0.62 MG/DL (ref 0.52–1.04)
DIFFERENTIAL METHOD BLD: NORMAL
EOSINOPHIL # BLD AUTO: 0.1 10E9/L (ref 0–0.7)
EOSINOPHIL NFR BLD AUTO: 1.6 %
ERYTHROCYTE [DISTWIDTH] IN BLOOD BY AUTOMATED COUNT: 13.8 % (ref 10–15)
GFR SERPL CREATININE-BSD FRML MDRD: >90 ML/MIN/{1.73_M2}
GLUCOSE SERPL-MCNC: 98 MG/DL (ref 70–99)
HCT VFR BLD AUTO: 41.7 % (ref 35–47)
HGB BLD-MCNC: 13.9 G/DL (ref 11.7–15.7)
LYMPHOCYTES # BLD AUTO: 1.4 10E9/L (ref 0.8–5.3)
LYMPHOCYTES NFR BLD AUTO: 18.9 %
MCH RBC QN AUTO: 29.3 PG (ref 26.5–33)
MCHC RBC AUTO-ENTMCNC: 33.3 G/DL (ref 31.5–36.5)
MCV RBC AUTO: 88 FL (ref 78–100)
MONOCYTES # BLD AUTO: 0.5 10E9/L (ref 0–1.3)
MONOCYTES NFR BLD AUTO: 6.9 %
NEUTROPHILS # BLD AUTO: 5.4 10E9/L (ref 1.6–8.3)
NEUTROPHILS NFR BLD AUTO: 72.3 %
PLATELET # BLD AUTO: 267 10E9/L (ref 150–450)
POTASSIUM SERPL-SCNC: 4 MMOL/L (ref 3.4–5.3)
PROT SERPL-MCNC: 7.4 G/DL (ref 6.8–8.8)
RBC # BLD AUTO: 4.75 10E12/L (ref 3.8–5.2)
SODIUM SERPL-SCNC: 138 MMOL/L (ref 133–144)
TSH SERPL DL<=0.005 MIU/L-ACNC: 1.15 MU/L (ref 0.4–4)
WBC # BLD AUTO: 7.4 10E9/L (ref 4–11)

## 2019-10-21 PROCEDURE — 84443 ASSAY THYROID STIM HORMONE: CPT | Performed by: FAMILY MEDICINE

## 2019-10-21 PROCEDURE — 36415 COLL VENOUS BLD VENIPUNCTURE: CPT | Performed by: FAMILY MEDICINE

## 2019-10-21 PROCEDURE — 85025 COMPLETE CBC W/AUTO DIFF WBC: CPT | Performed by: FAMILY MEDICINE

## 2019-10-21 PROCEDURE — 99214 OFFICE O/P EST MOD 30 MIN: CPT | Performed by: FAMILY MEDICINE

## 2019-10-21 PROCEDURE — 80053 COMPREHEN METABOLIC PANEL: CPT | Performed by: FAMILY MEDICINE

## 2019-10-21 RX ORDER — MECLIZINE HYDROCHLORIDE 25 MG/1
25 TABLET ORAL 3 TIMES DAILY PRN
Qty: 10 TABLET | Refills: 0 | Status: CANCELLED | OUTPATIENT
Start: 2019-10-21 | End: 2019-10-28

## 2019-10-21 ASSESSMENT — ANXIETY QUESTIONNAIRES
1. FEELING NERVOUS, ANXIOUS, OR ON EDGE: NOT AT ALL
7. FEELING AFRAID AS IF SOMETHING AWFUL MIGHT HAPPEN: NOT AT ALL
IF YOU CHECKED OFF ANY PROBLEMS ON THIS QUESTIONNAIRE, HOW DIFFICULT HAVE THESE PROBLEMS MADE IT FOR YOU TO DO YOUR WORK, TAKE CARE OF THINGS AT HOME, OR GET ALONG WITH OTHER PEOPLE: NOT DIFFICULT AT ALL
GAD7 TOTAL SCORE: 0
6. BECOMING EASILY ANNOYED OR IRRITABLE: NOT AT ALL
2. NOT BEING ABLE TO STOP OR CONTROL WORRYING: NOT AT ALL
5. BEING SO RESTLESS THAT IT IS HARD TO SIT STILL: NOT AT ALL
3. WORRYING TOO MUCH ABOUT DIFFERENT THINGS: NOT AT ALL

## 2019-10-21 ASSESSMENT — PATIENT HEALTH QUESTIONNAIRE - PHQ9
5. POOR APPETITE OR OVEREATING: NOT AT ALL
SUM OF ALL RESPONSES TO PHQ QUESTIONS 1-9: 0

## 2019-10-21 NOTE — RESULT ENCOUNTER NOTE
Alessandra LamarChester Lam,  Your results came back and are within acceptable limits. -Normal red blood cell (hgb) levels, normal white blood cell count and normal platelet levels.. If you have any further concerns please do not hesitate to contact us by message, phone or making an appointment.  Have a good day   Dr Diego BAUTISTA

## 2019-10-21 NOTE — RESULT ENCOUNTER NOTE
Alessandra Ms. Lam,  Your results came back and are within acceptable limits. -Liver and gallbladder tests are normal (ALT,AST, Alk phos, bilirubin), kidney function is normal (Cr, GFR), sodium is normal, potassium is normal, calcium is normal, glucose is normal.  -TSH (thyroid stimulating hormone) level is normal which indicates normal thyroid function.. If you have any further concerns please do not hesitate to contact us by message, phone or making an appointment.  Have a good day   Dr Diego BAUTISTA

## 2019-10-21 NOTE — PATIENT INSTRUCTIONS
Labs today.  Try meclizine 25 mg every 8 hours as needed for dizziness.  Flonase to nose and Zyrtec daily to weeks will help decongest sinuses which will also help with dizziness.  Physical therapy referral recommended to evaluate for vertigo.  Avoid benzodiazepines and increase fluid and avoid alcohol until better.  Avoid driving while dizzy.  Go to the ER if worse.  Consider neurology evaluation and imaging if not better.  Can do a cardiac evaluation after that if no answer.  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  Received flu shot at the pharmacy on 10/12/2019.  Follow-up with PCP Dr. Aguila  in 1 month for recheck and also due for a physical etc. in January.        Patient Education     Benign Paroxysmal Positional Vertigo     Your health care provider may move your head in certain ways to treat your BPPV.     Benign paroxysmal positional vertigo (BPPV) is a problem with the inner ear. The inner ear contains the vestibular system. This system is what helps you keep your balance. BPPV causes a feeling of spinning. It is a common problem of the vestibular system.  Understanding the vestibular system  The vestibular system of the ear is made up of very tiny parts. They include the utricle, saccule, and semicircular canals. The utricle is a tiny organ that contains calcium crystals. In some people, the crystals can move into the semicircular canals. When this happens, the system no longer works as it should. This causes BPPV. Benign means it is not life threatening. Paroxysmal means it happens suddenly. Positional means that it happens when you move your head. Vertigo is a feeling of spinning.  What causes BPPV?  Causes include injury to your head or neck. Other problems with the vestibular system may cause BPPV. In many people, the cause of BPPV is not  known.  Symptoms of BPPV  You many have repeated feelings of spinning (vertigo). The vertigo usually lasts less than 1 minute. Some movements, such as rolling over in bed, can bring on vertigo.  Diagnosing BPPV  Your primary healthcare provider may diagnose and treat your BPPV. Or you may see an ear, nose, and throat doctor (otolaryngologist). In some cases, you may see a nervous system doctor (neurologist).  The healthcare provider will ask about your symptoms and your medical history. He or she will examine you. You may have hearing and balance tests. As part of the exam, your healthcare provider may have you move your head and body in certain ways. If you have BPPV, the movements can bring on vertigo. Your provider will also look for abnormal movements of your eyes. You may have other tests to check your vestibular or nervous systems.  Treatment for BPPV  Your healthcare provider may try to move the calcium crystals. This is done by having you move your head and neck in certain ways. This treatment is safe and often works well. You may also be told to do these movements at home. You may still have vertigo for a few weeks. Your healthcare provider will recheck your symptoms, usually in about a month. Special physical therapy may also be part of treatment. In rare cases, surgery may be needed for BPPV that does not go away.     When to call the healthcare provider  Call your healthcare provider right away if you have any of these:    Symptoms that do not go away with treatment    Symptoms that get worse    New symptoms   Date Last Reviewed: 5/1/2017 2000-2018 [employer]. [address] [place] [address], [place] [address]. All rights reserved. This information is not intended as a substitute for professional medical care. Always follow your healthcare professional's instructions.

## 2019-10-21 NOTE — PROGRESS NOTES
Subjective     Joan Lam is a 54 year old female who presents to clinic today for the following health issues:    HPI   Dizziness    Duration: 24 hrs    Description   Feeling faint:  no   Feeling like the surroundings are moving: no   Loss of consciousness or falls: no     Intensity:  moderate    Accompanying signs and symptoms:   Nausea/vomiting: no   Palpitations: no   Weakness in arms or legs: no   Vision or speech changes: no   Ringing in ears (Tinnitus): YES  Hearing loss related to dizziness: no   Other (fevers/chills/sweating/dyspnea): YES-  Scratchy throat and feeling unbalance      History (similar episodes/head trauma/previous evaluation/recent bleeding): None    Precipitating or alleviating factors (new meds/chemicals):  Worst in the morning when getting up and when bending forward  Worse with activity/head movement: YES    Therapies tried and outcome: None  Pt received flu shot at  [facility] on 10/12/2019    Woke up yesterday , got up and off balance, didn't stumble , just felt dizzy, no spinning in space, no blacking out or near syncope, never happened before. Usually get sinus headaches not lately. Has noted ear fullness  . No travel since early sept.  No fever or chills, no headache, mild pressure from rain , sinus pressure, no double or blurry vision, no earache, feel more ringing lately, uses ear plugs, sore throat, mildly scratchy, no runny nose, mild, post nasal drip, no trouble hearing, smelling, tasting or swallowing, no cough, no chest pain, trouble breathing or palpitations, No abdominal pain, cramps monthly even if no period, no heart burn, reflux, no nausea or vomiting or diarrhea or constipation, no blood in stools or black stools, no weight loss or night sweats. No dysuria, hematuria, frequency, urgency, hesitancy, incontinence, No pelvic complaints. No leg swelling or joint pain. No rash.  No change in meds , no herbal remedies, did restart vit d 3.  Hx of allergies , may  be acting up  On Prometrium by gyn for hormonal balance and thickening uterus lining  Not used lorazepam in couple weeks  Drove here but lives close by  Dizziness with position change , if still not noted  Dizziness lasts 30 min to settle after first feels it.  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  Seen by endocrine last in April 2018 and declined treatment with Cabergoline, symptoms were mild to none at the time.  Denies any vision changes and has no breast discharge.  Prior colonoscopy, bunionectomy, cervical biopsy on vitamin D and Prometrium, under care of PCP Dr Aguila.  Seen by her 7/10/2019 for NATALIE and lorazepam refill and recommend follow-up in 3 months.    Patient Active Problem List   Diagnosis     Allergic rhinitis     Mild recurrent major depression (H)     Eczema of eyelid     NATALIE (generalized anxiety disorder)     Essential hypertension with goal blood pressure less than 140/90     Cervical high risk HPV (human papillomavirus) test positive     Past Surgical History:   Procedure Laterality Date     BIOPSY  mid-90's    from cervix after abnormal Pap. Benign.     BUNIONECTOMY RT/LT  2005    Right Foot     BUNIONECTOMY RT/LT  2006    Left Foot      COLONOSCOPY  January 15, 2018    1 polyp - benign. Next exam in 10 years.       Social History     Tobacco Use     Smoking status: Never Smoker     Smokeless tobacco: Never Used   Substance Use Topics     Alcohol use: Yes     Comment: Less than 4 servings (drinks) per month     Family History   Problem Relation Age of Onset      Hypertension Father      Breast Cancer Mother         Cancer-free for 2 years     Cerebrovascular Disease Maternal Grandmother      Breast Cancer Maternal Grandmother      Colon Cancer Maternal Grandmother      Substance Abuse Maternal Grandfather      Anxiety Disorder Paternal Grandmother      Depression Paternal Grandfather      Breast Cancer Other         Mat. Aunt - cancer free now     Substance Abuse Brother          Current Outpatient Medications   Medication Sig Dispense Refill     amLODIPine (NORVASC) 5 MG tablet TAKE 1 TABLET BY MOUTH  DAILY 90 tablet 1     cetirizine (ZYRTEC) 10 MG tablet Take 10 mg by mouth daily       cholecalciferol (VITAMIN D3) 1000 UNIT tablet Take 3 tablets (3,000 Units) by mouth daily       desonide (DESOWEN) 0.05 % external ointment Apply topically 2 times daily as needed (to affected area) up to one week at a time. 15 g 1     FLUoxetine (PROZAC) 20 MG capsule TAKE 2 CAPSULES BY MOUTH  DAILY 180 capsule 0     fluticasone (FLONASE) 50 MCG/ACT nasal spray Spray 2 sprays into both nostrils daily       levothyroxine (SYNTHROID/LEVOTHROID) 100 MCG tablet Take 1 tablet (100 mcg) by mouth daily 90 tablet 3     LORazepam (ATIVAN) 0.5 MG tablet Take 1 tablet (0.5 mg) by mouth daily as needed for anxiety 60 tablet 0     progesterone (PROMETRIUM) 100 MG capsule Take 2 capsules (200 mg) by mouth At Bedtime 180 capsule 0     Allergies   Allergen Reactions     No Known Drug Allergies      Recent Labs   Lab Test 06/07/19  0733 04/10/18  1645  09/17/13   LDL 63  --   --  77   HDL 37*  --   --  37   TRIG 181*  --   --  139   ALT  --  26  --   --    CR 0.72 0.69   < >  --    GFRESTIMATED >90 89   < >  --    GFRESTBLACK >90 >90   < >  --    POTASSIUM 4.0 3.4   < >  --    TSH 0.98 0.97  --   --     < > = values in this interval not displayed.      BP Readings from Last 3 Encounters:   10/21/19 130/88   07/10/19 128/87   04/08/19 130/84    Wt Readings from Last 3 Encounters:   10/21/19 92.5 kg (204  lb)   07/10/19 91.2 kg (201 lb)   04/08/19 89.6 kg (197 lb 8 oz)                    Reviewed and updated as needed this visit by Provider  Tobacco  Allergies  Meds  Problems  Med Hx  Surg Hx  Fam Hx  Soc Hx          Review of Systems   ROS COMP: Constitutional, HEENT, cardiovascular, pulmonary, GI, , musculoskeletal, neuro, skin, endocrine and psych systems are negative, except as otherwise noted.      Objective    /88 (BP Location: Left arm, Patient Position: Chair, Cuff Size: Adult Large)   Pulse 73   Temp 98.4  F (36.9  C) (Tympanic)   Resp 13   Wt 92.5 kg (204 lb)   LMP 07/10/2019 (Exact Date)   SpO2 95%   BMI 33.43 kg/m    Body mass index is 33.43 kg/m .  Physical Exam   GENERAL: healthy, alert, no distress and obese  EYES: Eyes grossly normal to inspection, PERRL and conjunctivae and sclerae normal  HENT: ear canals and TM's normal, nose and mouth without ulcers or lesions  NECK: no adenopathy, no asymmetry, masses, or scars and thyroid normal to palpation  RESP: lungs clear to auscultation - no rales, rhonchi or wheezes  CV: regular rate and rhythm, normal S1 S2, no S3 or S4, no murmur, click or rub, no peripheral edema and peripheral pulses strong  ABDOMEN: soft, nontender, no hepatosplenomegaly, no masses and bowel sounds normal  MS: no gross musculoskeletal defects noted, no edema  SKIN: no suspicious lesions or rashes  NEURO: Normal strength and tone, mentation intact and speech normal, no nystagmus seen.  Cranial nerves III through XII grossly intact.  Gait is normal.  Mild dizziness when goes from lying to sitting.  PSYCH: mentation appears normal, affect normal/bright    Diagnostic Test Results:  Labs reviewed in Epic  No results found for this or any previous visit (from the past 24 hour(s)).        Assessment & Plan       ICD-10-CM    1. Dizziness R42 CBC with platelets differential     Comprehensive metabolic panel     TSH with free T4 reflex     PHYSICAL THERAPY REFERRAL   2.  NATALIE (generalized anxiety disorder) F41.1 TSH with free T4 reflex   3. Essential hypertension with goal blood pressure less than 140/90 I10      Day of mild dizziness with no other symptoms or signs.  Examination benign neuro non focal.  Differential diagnosis include vertigo versus posterior circulation stroke versus prolactinoma versus something else.  Sinus congestion, allergic rhinitis and anxiety may also be contributing. Labs today. Try meclizine 25 mg every 8 hours as needed for dizziness. Flonase to nose and Zyrtec daily to weeks will help decongest sinuses which will also help with dizziness. Physical therapy referral recommended to evaluate for vertigo. Avoid benzodiazepines and increase fluid and avoid alcohol until better. Avoid driving while dizzy. Go to the ER if worse. Consider neurology evaluation and imaging if not better. Can do a cardiac evaluation after that if no answer. Cifuentes snot sound cardiac and neuro right now. 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 Received flu shot at the pharmacy on 10/12/2019. Follow-up with PCP Dr. Aguila  in 1 month for recheck and also due for a physical etc. in January.    Work on weight loss  Regular exercise  See Patient Instructions    Return in about 4 weeks (around 11/18/2019), or if symptoms worsen or fail to improve, for Routine Visit for chronic issues with PCP.    Christiana Cortez MD  [facility]

## 2019-10-22 ASSESSMENT — ANXIETY QUESTIONNAIRES: GAD7 TOTAL SCORE: 0

## 2019-10-24 ENCOUNTER — HOSPITAL ENCOUNTER (OUTPATIENT)
Dept: PHYSICAL THERAPY | Facility: CLINIC | Age: 54
Setting detail: THERAPIES SERIES
End: 2019-10-24
Attending: FAMILY MEDICINE
Payer: COMMERCIAL

## 2019-10-24 DIAGNOSIS — R42 DIZZINESS: ICD-10-CM

## 2019-10-24 PROCEDURE — 97162 PT EVAL MOD COMPLEX 30 MIN: CPT | Mod: GP | Performed by: PHYSICAL THERAPIST

## 2019-10-24 PROCEDURE — 97112 NEUROMUSCULAR REEDUCATION: CPT | Mod: GP | Performed by: PHYSICAL THERAPIST

## 2019-10-25 NOTE — PROGRESS NOTES
10/24/19 0800   Quick Adds   Quick Adds Vestibular Eval   Type of Visit Initial OP PT Evaluation   General Information   Start of Care Date 10/24/19   Referring Physician Christiana Cortez MD    Orders Evaluate and Treat as Indicated   Order Date 10/21/19   Medical Diagnosis Dizziness R42    Onset of illness/injury or Date of Surgery 10/20/19   Precautions/Limitations no known precautions/limitations   Surgical/Medical history reviewed Yes   Pertinent history of current problem (include personal factors and/or comorbidities that impact the POC) Pt reports onset of imbalance/dizziness after rolling out of bed on 10/20/19, denied true room spinning vertigo, felt she needed to hang onto the walls for balance.  Leading up to this, she has noted B but L>R tinnitus and some mild crackling sensation in L>R ear.  She does endorse a mild sense of ear pressure, and does report longstanding hx of sinus issues.  She feels symptoms have improved mildly since onset.  She went to see her PCP, and was prescribed Meclizine, which helped mildly.    She does report increased stress with work lately, also some back pain from desk work, has been lying on her back stretching more over the past couple months.  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   Prior level of function comment Indep PLOF, walks daily, works full time   Previous/Current Treatment Medication(s)  (Meclizine)   Improvement after medication Mild   Patient role/Employment history Employed   Living environment House/[facility]   Assistive Devices Comments none   Patient/Family Goals Statement Resolve dizziness issues   Fall Risk Screen   Fall  "screen completed by PT   Have you fallen 2 or more times in the past year? No   Have you fallen and had an injury in the past year? No   Is patient a fall risk? No   Fall screen comments Low fall risk per balance assessment and 4-item DGI score 12/12   Functional Scales   Functional Scales and Outcomes DHI score 22/100   Pain   Patient currently in pain No   Cognitive Status Examination   Orientation orientation to person, place and time   Integumentary   Integumentary No deficits were identified   Posture   Posture Forward head position   Range of Motion (ROM)   ROM Comment WFL all extremities and CROM   Strength   Strength Comments WFL and symmetrical strength BUEs and BLEs, grossly 5/5   Bed Mobility   Bed Mobility Comments Independent bed mobility, denies overt dizziness with supine positioning, but does have breif dizziness supine > sit   Transfer Skills   Transfer Comments Independent sit <> stand transitions, minimal/no UE support needed   Gait   Gait Comments Amb reciprocal gait pattern, no AD, WFL gait speed and stability.   Gait Special Tests   Gait Special Tests DYNAMIC GAIT INDEX   Gait Special Tests Dynamic Gait Index   Comments 12/12 on 4-item DGI, low fall risk, WNL testing   Balance   Balance Comments Good functional static standing balance and gait stability, challenged with EC and on conforming surfaces   Balance Special Tests   Balance Special Tests Modified CTSIB Conditions   Balance Special Tests Modified CTSIB Conditions   Condition 1, seconds 30 Seconds   Condition 2, seconds 30 Seconds   Condition 4, seconds 30 Seconds   Condition 5, seconds 4 Seconds   Modified CTSIB Comments abnormal findings condition 5, indicating vestibular dysfunction   Sensory Examination   Sensory Perception Comments nothing acute, does report some intermittent R finger tingling she reports due to \"pinched nerve\" issues in her neck/shoulder from poor posture and desk work   Cervicogenic Screen   Neck ROM WFL "   Oculomotor Exam   Smooth Pursuit Normal   Saccades Normal   VOR Normal   Rapid Head Thrust Normal   Convergence Testing Normal   Infrared Goggle Exam or Frenzel Lense Exam   Vestibular Suppressant in Last 24 Hours? No   Exam completed with Infrared Goggles   Spontaneous Nystagmus Negative   Gaze Evoked Nystagmus Negative   Head Shake Horizontal Nystagmus Horizontal R   Head Shake Horizontal Nystagmus comments mild R beat x ~6 beats, mild dizziness reported   Positional Testing comments asymptomatic downbeating nystagmus with all positional testing, increased dizziness lasting <10'' with quick supine > sit positional changes   [place]-Hallpike (right) Downbeating   [place]-Hallpike (right) comments asymptomatic downbeating nystagmus lasting > 45''   [place]-Hallpike (Left) Downbeating   [place]-Hallpike (left) comments asymptomatic downbeating nystagmus lasting > 45''   [facility] Supine Roll Test (Right) Downbeating   [facility] Supine Roll Test (Right) Comments mild asymptomatic downbeating nystagmus lasting > 45''   [facility] Supine Roll Test (Left) Downbeating   [facility] Supine Roll Test (Left) Comments  asymptomatic downbeating nystagmus lasting > 45''   Dynamic Visual Acuity (DVA)   DVA Comments 1-line loss at 2Hz head movement, WNL testing, 10' distance   Planned Therapy Interventions   Planned Therapy Interventions balance training;gait training;neuromuscular re-education;other (see comments)  (VOR training)   Clinical Impression   Criteria for Skilled Therapeutic Interventions Met yes, treatment indicated   PT Diagnosis Dizziness and impaired balance   Influenced by the following impairments Impaired balance, dizziness   Functional limitations due to impairments Impaired tolerance with daily activity, dizziness with head movement   Clinical Presentation Evolving/Changing   Clinical Presentation Rationale uncontrolled symptoms, PMH, PLOF, previous hx of symptoms   Clinical Decision Making (Complexity) Moderate complexity   Therapy Frequency 1  time/week  (decreasing frequency as appropriate)   Predicted Duration of Therapy Intervention (days/wks) 6 wks   Risk & Benefits of therapy have been explained Yes   Patient, Family & other staff in agreement with plan of care Yes   Education Assessment   Barriers to Learning No barriers   GOALS   PT Eval Goals 1;2   Goal 1   Goal Identifier DHI   Goal Description Pt will score <5/100 on the DHI to show signficantly improved subjective report of symptoms and return to PLOF (baseline score 22/100).   Target Date 12/05/19   Goal 2   Goal Identifier Balance   Goal Description Pt will demo 30'' static standing balance EC on conforming surface to show improving balance and reduced impact with daily activities (baseline 5'', norms 30'')   Target Date 12/05/19   Total Evaluation Time   PT Amieal, Moderate Complexity Minutes (91006) 45

## 2019-10-31 ENCOUNTER — HOSPITAL ENCOUNTER (OUTPATIENT)
Dept: PHYSICAL THERAPY | Facility: CLINIC | Age: 54
Setting detail: THERAPIES SERIES
End: 2019-10-31
Attending: FAMILY MEDICINE
Payer: COMMERCIAL

## 2019-10-31 PROCEDURE — 97112 NEUROMUSCULAR REEDUCATION: CPT | Mod: GP | Performed by: PHYSICAL THERAPIST

## 2019-10-31 PROCEDURE — 95992 CANALITH REPOSITIONING PROC: CPT | Mod: GP | Performed by: PHYSICAL THERAPIST

## 2019-11-04 ENCOUNTER — HEALTH MAINTENANCE LETTER (OUTPATIENT)
Age: 54
End: 2019-11-04

## 2019-11-07 ENCOUNTER — HOSPITAL ENCOUNTER (OUTPATIENT)
Dept: PHYSICAL THERAPY | Facility: CLINIC | Age: 54
Setting detail: THERAPIES SERIES
End: 2019-11-07
Attending: FAMILY MEDICINE
Payer: COMMERCIAL

## 2019-11-07 PROCEDURE — 97164 PT RE-EVAL EST PLAN CARE: CPT | Mod: GP | Performed by: PHYSICAL THERAPIST

## 2019-11-07 NOTE — PROGRESS NOTES
11/07/19 0802   Quick Adds   Type of Visit OP PT Re-evaluation   General Information   Start of Care Date 10/24/19   Referring Physician Christiana Cortez MD    Orders Evaluate and Treat as Indicated   Medical Diagnosis Dizziness R42    Onset of illness/injury or Date of Surgery 10/20/19   Pertinent history of current problem (include personal factors and/or comorbidities that impact the POC) Pt presents today for 3rd PT visit to address dizziness concerns. At [facility], her exam showed only asymptomatic downbeating nystagmus with positional testing, but showed (+) s/s of R posterior canal BPPV on 2nd visit with mild balance concerns. She was successfully treated wt CRM on 2nd visit, and today reports resolution of vertigo symptoms.  Does report ongoing mild L>R ear pressure, indicates hx of longstanding sinus problems.  Has returned to baseline level of activity without restrictions.   System Outcome Measures   Dizziness Handicap Inventory (score out of 100) A decrease in score by 17.18 or greater indicates a clinically significant change in symptoms. 12   Gait   Gait Comments WNL gait speed and stability, reciprocal gait pattern, low fall risk   Gait Special Tests Dynamic Gait Index   Comments 12/12 on 4-item DGI, low fall risk, WNL testing   Balance   Balance Comments WNL static balance testing, improved from [facility]   Balance Special Tests Modified CTSIB Conditions   Condition 1, seconds 30 Seconds   Condition 2, seconds 30 Seconds   Condition 4, seconds 30 Seconds   Condition 5, seconds 30 Seconds  (mild/moderate sway)   Modified CTSIB Comments WNL testing   Oculomotor Exam   Smooth Pursuit Normal   Rapid Head Thrust Normal   Infrared Goggle Exam or Frenzel Lense Exam   Vestibular Suppressant in Last 24 Hours? No   Exam completed with Infrared Goggles   Spontaneous Nystagmus Negative   Gaze Evoked Nystagmus Negative   [place]-Hallpike (right) Negative   [place]-Hallpike (right) comments mild asymptomatic downbeating nystagmus  noted, was noted at eval as well   [place]-Hallpike (Left) Negative   [place]-Hallpike (left) comments mild asymptomatic downbeating nystagmus noted, was noted at eval as well   [facility] Supine Roll Test (Right) Negative   [facility] Supine Roll Test (Left) Negative   Clinical Impression   PT Diagnosis Dizziness and impaired balance   Clinical Impression Comments Pt appears appropriate for dc from OP PT services at this time. s/s of BPPV have resolved with CRM techniques.  Balance/gait measures have improved to normal standards, low fall risk.  Does demonstrate mild asymptomatic downbeating nystagmus with L and R hallpike positions, which is not explained by BPPV.  Does have longstanding sinus problems that could be contributing to mild lightheadedness symptoms at times.  Goal 1 not met, goal 2 met.   GOALS   PT Eval Goals 1;2   Goal 1   Goal Identifier DHI   Goal Description Pt will score <5/100 on the DHI to show signficantly improved subjective report of symptoms and return to PLOF (baseline score 22/100).   Target Date 12/05/19   Date Met   (goal not fully met, but improved to 12/100)   Goal 2   Goal Identifier Balance   Goal Description Pt will demo 30'' static standing balance EC on conforming surface to show improving balance and reduced impact with daily activities (baseline 5'', norms 30'')   Target Date 12/05/19   Date Met 11/07/19   Total Evaluation Time   PT Eval, Re-eval Minutes ([id number]) 20

## 2019-11-11 ENCOUNTER — OFFICE VISIT (OUTPATIENT)
Dept: FAMILY MEDICINE | Facility: CLINIC | Age: 54
End: 2019-11-11
Payer: COMMERCIAL

## 2019-11-11 VITALS
RESPIRATION RATE: 14 BRPM | SYSTOLIC BLOOD PRESSURE: 130 MMHG | DIASTOLIC BLOOD PRESSURE: 88 MMHG | WEIGHT: 205.25 LBS | HEART RATE: 80 BPM | TEMPERATURE: 98.3 F | OXYGEN SATURATION: 99 % | BODY MASS INDEX: 33.64 KG/M2

## 2019-11-11 DIAGNOSIS — F41.1 GAD (GENERALIZED ANXIETY DISORDER): ICD-10-CM

## 2019-11-11 DIAGNOSIS — R39.9 LOWER URINARY TRACT SYMPTOMS: Primary | ICD-10-CM

## 2019-11-11 LAB
ALBUMIN UR-MCNC: NEGATIVE MG/DL
APPEARANCE UR: CLEAR
BILIRUB UR QL STRIP: NEGATIVE
COLOR UR AUTO: YELLOW
GLUCOSE UR STRIP-MCNC: NEGATIVE MG/DL
HGB UR QL STRIP: NEGATIVE
KETONES UR STRIP-MCNC: NEGATIVE MG/DL
LEUKOCYTE ESTERASE UR QL STRIP: NEGATIVE
NITRATE UR QL: NEGATIVE
PH UR STRIP: 6.5 PH (ref 5–7)
SOURCE: NORMAL
SP GR UR STRIP: 1.02 (ref 1–1.03)
SPECIMEN SOURCE: NORMAL
UROBILINOGEN UR STRIP-ACNC: 0.2 EU/DL (ref 0.2–1)
WET PREP SPEC: NORMAL

## 2019-11-11 PROCEDURE — 81003 URINALYSIS AUTO W/O SCOPE: CPT | Performed by: FAMILY MEDICINE

## 2019-11-11 PROCEDURE — 99214 OFFICE O/P EST MOD 30 MIN: CPT | Performed by: FAMILY MEDICINE

## 2019-11-11 PROCEDURE — 87210 SMEAR WET MOUNT SALINE/INK: CPT | Performed by: FAMILY MEDICINE

## 2019-11-11 NOTE — PATIENT INSTRUCTIONS
Urine currently does not show a urine infection  Wet prep is also normal  Can try azo otc  possible vaginal atrophy   Avoid caffeine and carbonated drinks  Do not hold urine, go frequently and drink enough water, cotton only to groin area, avoid bubble baths, soaps to area, use less detergent with under clothing as can irritate bladder  Avoid tight underwear, under wear at night.  Keep apt Dr Aguila in nov for mood  Due for physical and pap and update vaccines in jan 2020  Follow up with endocrine and get mri head for hx of possible prolactinoma      Patient Education     Atrophic Vaginitis    Atrophic vaginitis means the walls of your vagina have become thin. This happens when your body makes too little of the hormone estrogen. Menopause or surgical removal of the ovaries are the most common causes for a drop in estrogen. Breastfeeding can also cause the hormone level to drop.  Symptoms of atrophic vaginitis include:    Dryness, soreness, burning, or itching in the vagina    Vaginal discharge  Sex can be uncomfortable, even painful. After sex, you may have bleeding from your vagina. You may also have burning or pain when you urinate.  Home care  Your healthcare provider may recommend 1 or more of these as treatment:    Vaginal creams, lotions, and lubricants. These products help relieve vaginal dryness. They don t need a prescription. They can be found in the personal care section of most pharmacies. Creams and lotions are used daily to help keep the vagina moist. Lubricants help reduce dryness and pain during sex. Choose water-based lubricants. Don t use petroleum jelly, mineral oil, or other oils. These increase the chance of infection.     Hormone therapy (HT). HT increases the amount of estrogen in your body. This can help manage or relieve symptoms. HT can be given in pill form. It may be given as a lotion, cream, ring put into the vagina, or a patch on the skin. The risks and benefits of HT vary for each  woman. For instance, your risk may be higher if you have had breast cancer. Discuss this treatment with your healthcare provider. Not every woman can use HT.  You don t need to give up (abstain from) sex. In fact, regular sex can help keep vaginal tissues healthy. Take steps to make sex more comfortable by using water-based lubricants.  Preventing infections  Atrophic vaginitis makes an infection of the vagina or the urinary tract more likely. To help reduce your risk:    Keep your genitals clean. When you bathe, wash the outside of your vagina with mild soap and water. Clean gently between the folds of your vagina.    Wipe from front to back after a bowel movement.    Don t douche unless your healthcare provider tells you to.    Avoid scented toilet paper, scented vaginal sprays, and scented tampons.    Avoid wearing clothes that are tight in the crotch. These include pantyhose, jeans, and leggings. Wear cotton underwear. Change it every day.  Follow-up care  Follow up with your healthcare provider, or as advised.  When to seek medical advice  Call your health care provider right away if any of these occur:    Fever of 100.4 F (38 C) or higher, or as directed by your healthcare provider    Symptoms don t go away or get worse even with treatment    Vaginal area swells or becomes painful    Vaginal area bleeds, but not because of your period    Bad-smelling discharge from the vagina    Pain or burning when you urinate or you have trouble passing urine    Open sores develop around vagina   Date Last Reviewed: 12/1/2016 2000-2018 [employer]. [address] [place] [address], [place] [address]. All rights reserved. This information is not intended as a substitute for professional medical care. Always follow your healthcare professional's instructions.

## 2019-11-11 NOTE — PROGRESS NOTES
Subjective     Joan Lam is a 54 year old female who presents to clinic today for the following health issues:    HPI   URINARY TRACT SYMPTOMS    Duration:  10/28/2019    Description  frequency, urgency, follow-up UTI    Intensity:  mild    Accompanying signs and symptoms:  Fever/chills: YES- chills early on but not right now  Flank pain no   Nausea and vomiting: no   Vaginal symptoms:  Skin sensitive/ stings   Abdominal/Pelvic Pain: no - pressure    History  History of frequent UTI's: YES  History of kidney stones: no   Sexually Active: no   Possibility of pregnancy: No    Precipitating or alleviating factors:  Coffee make it worst     Therapies tried and outcome: course of antibiotics -  Antibiotic use for UTI and increase fluid intake   Outcome: mild    BMI more than 33, history of anxiety and lorazepam, depression on Prozac, hypertension on amlodipine, allergic rhinitis on Zyrtec and Flonase, eczema on desonide cream, hypothyroidism on levothyroxine history of high risk HPV and remote history of possible prolactinoma normal under care of endocrine in the past, MRI was in 2006, could not afford due to insurance changes but now fully covered.  Seen by endocrine last in April 2018 and declined treatment with Cabergoline, symptoms were mild to none at the time.  Denies any vision changes and has no breast discharge.  Prior colonoscopy, bunionectomy, cervical biopsy on vitamin D and Prometrium by gyn for hormonal balance and thickening uterus lining, under care of PCP Dr Aguila. Seen by her 7/10/2019 for NATALIE and lorazepam refilled and recommend follow-up in 3 months.   Seen first time by this writer on 10/21/19 for one day of mild dizziness with no other symptoms or signs.  Examination was benign & neuro non focal.  Differential diagnosis included vertigo versus posterior circulation stroke versus prolactinoma versus something else.  Sinus congestion, allergic rhinitis and anxiety  could also be contributing.  Labs cbc, TSH & cmp were normal. advised to try meclizine 25 mg every 8 hours as needed for dizziness. Flonase to nose and Zyrtec daily two weeks to help decongest sinuses which could also help with dizziness. Physical therapy referral recommended to evaluate for vertigo. Advised to avoid benzodiazepines and increase fluid and avoid alcohol until better. Avoid driving while dizzy. To go to the ER if worse. To consider neurology evaluation and imaging if not better. Did not sound cardiac and neuro that day. Given prior history of possible prolactinoma advised if symptoms persisted especially to return to endocrine and get the MRI of head  previously recommended & not done. Noted had received flu shot at the pharmacy on 10/12/2019. Seen by PT 10/24 and symptoms resolved by 3rd session noted on 11/7/19.     Here for possible urine infection  Notes her dizziness resolved, she still has a little bit of left earache but is much improved and no longer on the meclizine which she took for the first couple of days.  Soon after being seen on 21 October had her period after a very long time.  Once that was done then started noticing some pelvic pressure and urgency and frequency similar to UTI's in the past.  She did virtual visit at work on 10/28 and given an antibiotic for 5 days, thinks it was nitrofurantoin which she completed on the second about 9 days ago. Her symptoms did not go away and usually in the past with a UTI symptoms go away before med is completed. Symptoms worse with coffee. Had chills one day before symptoms began but not currently. Has had no vaginal discharge, no travel, no tight clothing, no different detergents, no bubble baths, no sex recently, reports no fever. No headache or dizziness, no double or blurry vision, no facial pain, sore throat, runny nose, post nasal drip, no trouble hearing, smelling, tasting or swallowing, no cough , no chest pain, trouble breathing or palpitations, No abdominal pain,  heart burn, reflux, nausea or vomiting or diarrhea or constipation, no blood in stools or black stools, no weight loss or night sweats. No dysuria, hematuria, hesitancy, incontinence, No pelvic complaints. No leg swelling or joint pain. No rash.    Patient Active Problem List   Diagnosis     Allergic rhinitis     Mild recurrent major depression (H)     Eczema of eyelid     NATALIE (generalized anxiety disorder)     Essential hypertension with goal blood pressure less than 140/90     Cervical high risk HPV (human papillomavirus) test positive     BMI 32.0-32.9,adult     Past Surgical History:   Procedure Laterality Date     BIOPSY  mid-90's    from cervix after abnormal Pap. Benign.     BUNIONECTOMY RT/LT  2005    Right Foot     BUNIONECTOMY RT/LT  2006    Left Foot      COLONOSCOPY  January 15, 2018    1 polyp - benign. Next exam in 10 years.       Social History     Tobacco Use     Smoking status: Never Smoker     Smokeless tobacco: Never Used   Substance Use Topics     Alcohol use: Yes     Comment: Less than 4 servings (drinks) per month     Family History   Problem Relation Age of Onset     Hypertension Father      Breast Cancer Mother         Cancer-free for 2 years     Cerebrovascular Disease Maternal Grandmother      Breast Cancer Maternal Grandmother      Colon Cancer Maternal Grandmother      Substance Abuse Maternal Grandfather      Anxiety Disorder Paternal Grandmother      Depression Paternal Grandfather      Breast Cancer Other         Mat. Aunt - cancer free now     Substance Abuse Brother          Current Outpatient Medications   Medication Sig Dispense Refill     amLODIPine (NORVASC) 5 MG tablet TAKE 1 TABLET BY MOUTH  DAILY 90 tablet 1     cetirizine (ZYRTEC) 10 MG tablet Take 10 mg by mouth daily       cholecalciferol (VITAMIN D3) 1000 UNIT tablet Take 3 tablets (3,000 Units) by mouth daily       desonide (DESOWEN) 0.05 % external ointment Apply topically 2 times daily as needed (to affected area) up to  one week at a time. 15 g 1     FLUoxetine (PROZAC) 20 MG capsule TAKE 2 CAPSULES BY MOUTH  DAILY 180 capsule 0     fluticasone (FLONASE) 50 MCG/ACT nasal spray Spray 2 sprays into both nostrils daily       levothyroxine (SYNTHROID/LEVOTHROID) 100 MCG tablet Take 1 tablet (100 mcg) by mouth daily 90 tablet 3     LORazepam (ATIVAN) 0.5 MG tablet Take 1 tablet (0.5 mg) by mouth daily as needed for anxiety 60 tablet 0     progesterone (PROMETRIUM) 100 MG capsule Take 2 capsules (200 mg) by mouth At Bedtime 180 capsule 0     Allergies   Allergen Reactions     No Known Drug Allergies      Recent Labs   Lab Test 10/21/19  1052 06/07/19  0733 04/10/18  1645  09/17/13   LDL  --  63  --   --  77   HDL  --  37*  --   --  37   TRIG  --  181*  --   --  139   ALT 28  --  26  --   --    CR 0.62 0.72 0.69   < >  --    GFRESTIMATED >90 >90 89   < >  --    GFRESTBLACK >90 >90 >90   < >  --    POTASSIUM 4.0 4.0 3.4   < >  --    TSH 1.15 0.98 0.97   < >  --     < > = values in this interval not displayed.      BP Readings from Last 3 Encounters:   11/11/19 130/88   10/21/19 130/88   07/10/19 128/87    Wt Readings from Last 3 Encounters:   11/11/19 93.1 kg (205 lb 4 oz)   10/21/19 92.5 kg (204 lb)   07/10/19 91.2 kg (201 lb)         Reviewed and updated as needed this visit by Provider  Tobacco  Allergies  Meds  Med Hx  Surg Hx  Fam Hx  Soc Hx        Review of Systems   ROS COMP: Constitutional, HEENT, cardiovascular, pulmonary, GI, , musculoskeletal, neuro, skin, endocrine and psych systems are negative, except as otherwise noted.      Objective    /88 (BP Location: Left arm, Patient Position: Chair, Cuff Size: Adult Large)   Pulse 80   Temp 98.3  F (36.8  C) (Oral)   Resp 14   Wt 93.1 kg (205 lb 4 oz)   LMP 10/21/2019 (Exact Date)   SpO2 99%   BMI 33.64 kg/m    Body mass index is 33.64 kg/m .  Physical Exam   GENERAL: alert, no distress and obese  EYES: Eyes grossly normal to inspection, PERRL and conjunctivae and  sclerae normal  RESP: lungs clear to auscultation - no rales, rhonchi or wheezes  ABDOMEN: soft, non tender, no hepatosplenomegaly, no masses and bowel sounds normal  ABDOMEN: soft, non tender  NEURO: Normal strength and tone, mentation intact and speech normal  PSYCH: mentation appears normal, affect normal/bright    Diagnostic Test Results:  Labs reviewed in Epic  Results for orders placed or performed in visit on 11/11/19 (from the past 24 hour(s))   UA reflex to Microscopic and Culture   Result Value Ref Range    Color Urine Yellow     Appearance Urine Clear     Glucose Urine Negative NEG^Negative mg/dL    Bilirubin Urine Negative NEG^Negative    Ketones Urine Negative NEG^Negative mg/dL    Specific Gravity Urine 1.020 1.003 - 1.035    Blood Urine Negative NEG^Negative    pH Urine 6.5 5.0 - 7.0 pH    Protein Albumin Urine Negative NEG^Negative mg/dL    Urobilinogen Urine 0.2 0.2 - 1.0 EU/dL    Nitrite Urine Negative NEG^Negative    Leukocyte Esterase Urine Negative NEG^Negative    Source Urine    Wet prep   Result Value Ref Range    Specimen Description Vagina     Wet Prep No Trichomonas seen     Wet Prep No clue cells seen     Wet Prep No yeast seen     Wet Prep Rare  WBC'S seen              Assessment & Plan       ICD-10-CM    1. Lower urinary tract symptoms R39.9 UA reflex to Microscopic and Culture     Wet prep   2. NATALIE (generalized anxiety disorder) F41.1    3. BMI 32.0-32.9,adult Z68.32 Wet prep     Urine currently does not show a urine infection  Given BMI and risk of yeast infection causing symptoms from humidity I checked a wet prep but it was also normal.   Can try azo OTC  Possible vaginal atrophy, if symptoms persist or recur can consider topical estrogen if no contraindication.  Avoid caffeine and carbonated drinks  Do not hold urine, go frequently and drink enough water, cotton only to groin area, avoid bubble baths, soaps to area, use less detergent with under clothing as can irritate  bladder  Avoid tight underwear, under wear at night  To keep apt with her PCP Dr Aguila in nov for mood  Due for physical and pap and update vaccines in jan 2020  Dizziness resolved and left ear ache almost gone , seen by PT and much better after that. Reminded to follow up with endocrine and get mri head for hx of possible prolactinoma in the past not followed up in a while  Brad: anxious but felt relived with results & current plan in place.  See Patient Instructions    Return in about 4 weeks (around 12/9/2019), or if symptoms worsen or fail to improve, for Routine Visit for chronic issues with PCP.    Christiana Cortez MD  [facility]

## 2019-12-06 DIAGNOSIS — F41.1 GAD (GENERALIZED ANXIETY DISORDER): ICD-10-CM

## 2019-12-06 DIAGNOSIS — F33.42 MAJOR DEPRESSIVE DISORDER, RECURRENT EPISODE, IN FULL REMISSION (H): ICD-10-CM

## 2019-12-06 NOTE — TELEPHONE ENCOUNTER
"Requested Prescriptions   Pending Prescriptions Disp Refills     FLUoxetine (PROZAC) 20 MG capsule [Pharmacy Med Name: FLUOXETINE  20MG  CAP]  Last Written Prescription Date:  9/10/2019  Last Fill Quantity: 180 caps,  # refills: 0   Last office visit: 11/11/2019 with prescribing provider:  Marlee   Future Office Visit:   180 capsule 0     Sig: TAKE 2 CAPSULES BY MOUTH  DAILY   PHQ-9 SCORE 4/8/2019 7/10/2019 10/21/2019   PHQ-9 Total Score - - -   PHQ-9 Total Score MyChart - 1 (Minimal depression) -   PHQ-9 Total Score 3 1 0     NATALIE-7 SCORE 4/8/2019 7/10/2019 10/21/2019   Total Score - - -   Total Score - 4 (minimal anxiety) -   Total Score 4 4 0           SSRIs Protocol Passed - 12/6/2019  4:48 AM        Passed - PHQ-9 score less than 5 in past 6 months     Please review last PHQ-9 score.           Passed - Medication is active on med list        Passed - Patient is age 18 or older        Passed - No active pregnancy on record        Passed - No positive pregnancy test in last 12 months        Passed - Recent (6 mo) or future (30 days) visit within the authorizing provider's specialty     Patient had office visit in the last 6 months or has a visit in the next 30 days with authorizing provider or within the authorizing provider's specialty.  See \"Patient Info\" tab in inbasket, or \"Choose Columns\" in Meds & Orders section of the refill encounter.             "

## 2020-01-13 ENCOUNTER — DOCUMENTATION ONLY (OUTPATIENT)
Dept: CARE COORDINATION | Facility: CLINIC | Age: 55
End: 2020-01-13

## 2020-01-27 DIAGNOSIS — N85.01 ENDOMETRIAL HYPERPLASIA, SIMPLE: ICD-10-CM

## 2020-01-27 DIAGNOSIS — F41.1 GAD (GENERALIZED ANXIETY DISORDER): ICD-10-CM

## 2020-01-27 DIAGNOSIS — F33.42 MAJOR DEPRESSIVE DISORDER, RECURRENT EPISODE, IN FULL REMISSION (H): ICD-10-CM

## 2020-01-27 NOTE — TELEPHONE ENCOUNTER
Signed Prescriptions:                        Disp   Refills    progesterone (PROMETRIUM) 100 MG capsule   180 ca*0        Sig: Take 2 capsules (200 mg) by mouth At Bedtime  Authorizing Provider: BORA KOROMA  Ordering User: DOREEN DHILLON AE scheduled - rx sent.  Doreen Dhillon RN

## 2020-01-28 NOTE — TELEPHONE ENCOUNTER
"Requested Prescriptions   Pending Prescriptions Disp Refills     FLUoxetine (PROZAC) 20 MG capsule [Pharmacy Med Name: FLUOXETINE  20MG  CAP] 180 capsule 0     Sig: TAKE 2 CAPSULES BY MOUTH  DAILY  Last Written Prescription Date:  12/9/2019  Last Fill Quantity: 180 capsule,  # refills: 0   Last Office Visit: 11/11/2019   Future Office Visit:    Next 5 appointments (look out 90 days)    Jan 30, 2020  8:40 AM CST  Darleen Colon with Doreen Aguila MD  [facility] ([facility]) [address] [place] [id number] [phone]              SSRIs Protocol Passed - 1/27/2020  8:58 PM        Passed - PHQ-9 score less than 5 in past 6 months     Please review last PHQ-9 score.   PHQ-9 SCORE 4/8/2019 7/10/2019 10/21/2019   PHQ-9 Total Score - - -   PHQ-9 Total Score MyChart - 1 (Minimal depression) -   PHQ-9 Total Score 3 1 0     NATALIE-7 SCORE 4/8/2019 7/10/2019 10/21/2019   Total Score - - -   Total Score - 4 (minimal anxiety) -   Total Score 4 4 0           Passed - Medication is active on med list        Passed - Patient is age 18 or older        Passed - No active pregnancy on record        Passed - No positive pregnancy test in last 12 months        Passed - Recent (6 mo) or future (30 days) visit within the authorizing provider's specialty     Patient had office visit in the last 6 months or has a visit in the next 30 days with authorizing provider or within the authorizing provider's specialty.  See \"Patient Info\" tab in inbasket, or \"Choose Columns\" in Meds & Orders section of the refill encounter.              "

## 2020-01-28 NOTE — PROGRESS NOTES
Subjective     Joan Lam is a 54 year old female who presents to clinic today for the following health issues:    HPI    Depression and Anxiety Follow-Up    How are you doing with your depression since your last visit? No change    How are you doing with your anxiety since your last visit?  Improved     Are you having other symptoms that might be associated with depression or anxiety? No    Have you had a significant life event? No     Do you have any concerns with your use of alcohol or other drugs? No     Taking Valerian root extract 1000 mg once daily in the evening.    She has not needed a refill of her lorazepam for the past 6 months and still has a few left.  Previously she needed to refill this (#60 lorazepam tabs) every 3 months.  She's been at her new job for over a year and that is going well.      Social History     Tobacco Use     Smoking status: Never Smoker     Smokeless tobacco: Never Used   Substance Use Topics     Alcohol use: Yes     Comment: Less than 4 servings (drinks) per month     Drug use: No     PHQ 7/10/2019 10/21/2019 1/30/2020   PHQ-9 Total Score 1 0 1   Q9: Thoughts of better off dead/self-harm past 2 weeks Not at all Not at all Not at all     NATALIE-7 SCORE 7/10/2019 10/21/2019 1/30/2020   Total Score - - -   Total Score 4 (minimal anxiety) - 3 (minimal anxiety)   Total Score 4 0 3         How many servings of fruits and vegetables do you eat daily?  2-3    On average, how many sweetened beverages do you drink each day (Examples: soda, juice, sweet tea, etc.  Do NOT count diet or artificially sweetened beverages)?   1    How many days per week do you exercise enough to make your heart beat faster? 4    How many minutes a day do you exercise enough to make your heart beat faster? 20 - 29    How many days per week do you miss taking your medication? 0        BP Readings from Last 3 Encounters:   01/30/20 108/74   11/11/19 130/88   10/21/19 130/88    Wt Readings from Last 3 Encounters:    01/30/20 91.1 kg (200 lb 12 oz)   11/11/19 93.1 kg (205 lb 4 oz)   10/21/19 92.5 kg (204 lb)            Reviewed and updated as needed this visit by Provider  Meds  Problems         Review of Systems   RESP:  NEGATIVE for shortness of breath  CV:  NEGATIVE for chest pain        Objective    /74 (BP Location: Right arm, Patient Position: Chair, Cuff Size: Adult Regular)   Pulse 71   Temp 97.2  F (36.2  C) (Oral)   Resp 16   Wt 91.1 kg (200 lb 12 oz)   SpO2 98%   BMI 32.90 kg/m    Body mass index is 32.9 kg/m .  Physical Exam   GEN:  no apparent distress  EYES: sclerae and conjunctivae clear with no discharge  LUNGS:  normal respiratory effort, and lungs clear to auscultation bilaterally - no rales, rhonchi or wheezes  CV: regular rate and rhythm, normal S1 S2, no S3 or S4 and no murmur, click or rub  PSYCH:  Appearance/Behavior: patient is appropriately and casually dressed.  Speech:  normal  rate, rhythm, and tone.  Mood/Affect: Bright/congruent.  Insight: good     Diagnostic Test Results:  Labs reviewed in Epic        Assessment & Plan       ICD-10-CM    1. NATALIE (generalized anxiety disorder) F41.1 FLUoxetine (PROZAC) 20 MG capsule     LORazepam (ATIVAN) 0.5 MG tablet   2. Major depressive disorder, recurrent episode, in full remission (H) F33.42 FLUoxetine (PROZAC) 20 MG capsule   3. Essential hypertension with goal blood pressure less than 140/90 I10 amLODIPine (NORVASC) 5 MG tablet     Her MDD, NATALIE and HTN are all stable/well controlled.  I do not need to see her again until she needs another renewal of her lorazepam.      I recommended Shingrix and mammogram.    She is seeing GYN next week for her diagnostic pap smear.    Patient Instructions   I recommend that you get the Shingrix vaccine at the pharmacy.  The Shingrix vaccination is a 2-shot series.  The second dose is given 6 months after the first.  (It cannot be given sooner than 2 months after the first dose - at the earliest.)  You should  be aware that patients are reporting feeling a bit under the weather after the injection, including fatigue, malaise, and low-grade fever that may last a couple days.  Rarely patients can get a mild, shingles-like rash.   But these symptoms are much better than the Shingles disease.       Call [facility] appointment line [phone] to schedule mammogram at [place].      Return in about 6 months (around 7/30/2020) for follow-up medical condition(s).    Doreen Aguila MD  [facility]

## 2020-01-30 ENCOUNTER — OFFICE VISIT (OUTPATIENT)
Dept: FAMILY MEDICINE | Facility: CLINIC | Age: 55
End: 2020-01-30
Payer: COMMERCIAL

## 2020-01-30 VITALS
OXYGEN SATURATION: 98 % | RESPIRATION RATE: 16 BRPM | HEART RATE: 71 BPM | DIASTOLIC BLOOD PRESSURE: 74 MMHG | TEMPERATURE: 97.2 F | WEIGHT: 200.75 LBS | BODY MASS INDEX: 32.9 KG/M2 | SYSTOLIC BLOOD PRESSURE: 108 MMHG

## 2020-01-30 DIAGNOSIS — I10 ESSENTIAL HYPERTENSION WITH GOAL BLOOD PRESSURE LESS THAN 140/90: ICD-10-CM

## 2020-01-30 DIAGNOSIS — F41.1 GAD (GENERALIZED ANXIETY DISORDER): ICD-10-CM

## 2020-01-30 DIAGNOSIS — F33.42 MAJOR DEPRESSIVE DISORDER, RECURRENT EPISODE, IN FULL REMISSION (H): ICD-10-CM

## 2020-01-30 PROCEDURE — 99214 OFFICE O/P EST MOD 30 MIN: CPT | Performed by: FAMILY MEDICINE

## 2020-01-30 RX ORDER — AMLODIPINE BESYLATE 5 MG/1
TABLET ORAL
Qty: 90 TABLET | Refills: 1 | Status: SHIPPED | OUTPATIENT
Start: 2020-01-30 | End: 2020-08-05

## 2020-01-30 RX ORDER — LORAZEPAM 0.5 MG/1
0.5 TABLET ORAL DAILY PRN
Qty: 60 TABLET | Refills: 0 | Status: CANCELLED | OUTPATIENT
Start: 2020-01-30

## 2020-01-30 RX ORDER — LORAZEPAM 0.5 MG/1
0.5 TABLET ORAL DAILY PRN
Qty: 60 TABLET | Refills: 0 | Status: SHIPPED | OUTPATIENT
Start: 2020-01-30 | End: 2020-09-03

## 2020-01-30 ASSESSMENT — ANXIETY QUESTIONNAIRES
1. FEELING NERVOUS, ANXIOUS, OR ON EDGE: SEVERAL DAYS
7. FEELING AFRAID AS IF SOMETHING AWFUL MIGHT HAPPEN: NOT AT ALL
GAD7 TOTAL SCORE: 3
6. BECOMING EASILY ANNOYED OR IRRITABLE: SEVERAL DAYS
4. TROUBLE RELAXING: SEVERAL DAYS
5. BEING SO RESTLESS THAT IT IS HARD TO SIT STILL: NOT AT ALL
GAD7 TOTAL SCORE: 3
3. WORRYING TOO MUCH ABOUT DIFFERENT THINGS: NOT AT ALL
7. FEELING AFRAID AS IF SOMETHING AWFUL MIGHT HAPPEN: NOT AT ALL
GAD7 TOTAL SCORE: 3
2. NOT BEING ABLE TO STOP OR CONTROL WORRYING: NOT AT ALL

## 2020-01-30 ASSESSMENT — PATIENT HEALTH QUESTIONNAIRE - PHQ9
SUM OF ALL RESPONSES TO PHQ QUESTIONS 1-9: 1
10. IF YOU CHECKED OFF ANY PROBLEMS, HOW DIFFICULT HAVE THESE PROBLEMS MADE IT FOR YOU TO DO YOUR WORK, TAKE CARE OF THINGS AT HOME, OR GET ALONG WITH OTHER PEOPLE: NOT DIFFICULT AT ALL
SUM OF ALL RESPONSES TO PHQ QUESTIONS 1-9: 1

## 2020-01-30 NOTE — PATIENT INSTRUCTIONS
I recommend that you get the Shingrix vaccine at the pharmacy.  The Shingrix vaccination is a 2-shot series.  The second dose is given 6 months after the first.  (It cannot be given sooner than 2 months after the first dose - at the earliest.)  You should be aware that patients are reporting feeling a bit under the weather after the injection, including fatigue, malaise, and low-grade fever that may last a couple days.  Rarely patients can get a mild, shingles-like rash.   But these symptoms are much better than the Shingles disease.       Call [facility] appointment line [phone] to schedule mammogram at [place].

## 2020-01-31 ASSESSMENT — PATIENT HEALTH QUESTIONNAIRE - PHQ9: SUM OF ALL RESPONSES TO PHQ QUESTIONS 1-9: 1

## 2020-01-31 ASSESSMENT — ANXIETY QUESTIONNAIRES: GAD7 TOTAL SCORE: 3

## 2020-02-05 ENCOUNTER — OFFICE VISIT (OUTPATIENT)
Dept: ENDOCRINOLOGY | Facility: CLINIC | Age: 55
End: 2020-02-05
Payer: COMMERCIAL

## 2020-02-05 ENCOUNTER — OFFICE VISIT (OUTPATIENT)
Dept: OBGYN | Facility: CLINIC | Age: 55
End: 2020-02-05
Payer: COMMERCIAL

## 2020-02-05 VITALS
BODY MASS INDEX: 32.62 KG/M2 | WEIGHT: 203 LBS | HEART RATE: 74 BPM | DIASTOLIC BLOOD PRESSURE: 93 MMHG | HEIGHT: 66 IN | SYSTOLIC BLOOD PRESSURE: 148 MMHG

## 2020-02-05 VITALS
WEIGHT: 203 LBS | BODY MASS INDEX: 32.62 KG/M2 | HEART RATE: 74 BPM | SYSTOLIC BLOOD PRESSURE: 148 MMHG | HEIGHT: 66 IN | DIASTOLIC BLOOD PRESSURE: 93 MMHG

## 2020-02-05 DIAGNOSIS — B97.7 HPV IN FEMALE: ICD-10-CM

## 2020-02-05 DIAGNOSIS — D49.7 PITUITARY TUMOR: ICD-10-CM

## 2020-02-05 DIAGNOSIS — D35.2 PROLACTINOMA (H): ICD-10-CM

## 2020-02-05 DIAGNOSIS — D35.2 PROLACTINOMA (H): Primary | ICD-10-CM

## 2020-02-05 DIAGNOSIS — Z12.4 SCREENING FOR MALIGNANT NEOPLASM OF CERVIX: Primary | ICD-10-CM

## 2020-02-05 LAB
PROLACTIN SERPL-MCNC: 44 UG/L (ref 3–27)
T4 FREE SERPL-MCNC: 1.08 NG/DL (ref 0.76–1.46)
TSH SERPL DL<=0.005 MIU/L-ACNC: 1 MU/L (ref 0.4–4)

## 2020-02-05 PROCEDURE — 87624 HPV HI-RISK TYP POOLED RSLT: CPT | Performed by: OBSTETRICS & GYNECOLOGY

## 2020-02-05 PROCEDURE — 99213 OFFICE O/P EST LOW 20 MIN: CPT | Performed by: OBSTETRICS & GYNECOLOGY

## 2020-02-05 PROCEDURE — G0145 SCR C/V CYTO,THINLAYER,RESCR: HCPCS | Performed by: OBSTETRICS & GYNECOLOGY

## 2020-02-05 SDOH — HEALTH STABILITY: MENTAL HEALTH: HOW OFTEN DO YOU HAVE A DRINK CONTAINING ALCOHOL?: 2-4 TIMES A MONTH

## 2020-02-05 ASSESSMENT — MIFFLIN-ST. JEOR
SCORE: 1547.55
SCORE: 1547.55

## 2020-02-05 ASSESSMENT — PAIN SCALES - GENERAL: PAINLEVEL: NO PAIN (0)

## 2020-02-05 NOTE — PROGRESS NOTES
Endocrinology Clinic Follow-Up Visit  Service Date: 02/05/20      Joan Lam : MRN:[id number]  :YOB: 1965  Primary care provider: Doreen Aguila   Attending Physician: Dr. Townsend    Reason for Endocrine consult: Follow-up on prolactinoma      HPI:    Joan Lam is a 54 year old female who is here for follow-up on her prolactinoma. PMHx also includes NATALIE, depression, HTN, simple endometrial hyperplasia, and hypothyroidism    She has remote history of a micro-prolactinoma diagnosed at age 40, at which time she had galactorrhea and amenorrhea. Her prolactin level was in the 60s-70s range, and a brain MRI showed microadenoma per her report. She did not have any medical treatment at the time, and prolactin level spontaneously decreased. She stopped seeing her endocrinologist at the time.    She did continue to follow with her PCP who was tracking prolactin level yearly. In 2015 prolactin was 42, then in November 2017 increased to 96.1, along with breast tenderness (no galactorrhea).     She came to our clinic for evaluation on 4/10/2018, at which time she reported breast tenderness. Prolactin level was elevated to 67 at that time. She was prescribed cabergoline 0.25 mg once a week and brain MRI was ordered, but due to job and insurance changes she did not end up getting the MRI or cabergoline.     She also notes taking progesterone -300 mg daily for hot flashes, which she started in 2010. However she wasn't taking it as regularly as she was prescribed because she was in between jobs/insurance. She notes that she hadn't had her period 6823-5233, then in 2018 started having symptoms (cramps), and then started having vaginal bleeding again. She thought this was weird since she hadn't had her period in so long. Her other doctor did some tests and found out it was related to taking progesterone sporadically due to her insurance issues. An endometrial biopsy and ultrasound was done in  2018, and she was diagnosed with simple endometrial hyperplasia. She is still taking progesterone regularly 200 mg daily. Now she has regular periods.    Regarding what's bringing her in today:  She now has different health insurance so she's able to come in. She's just here to follow through on monitoring for prolactinoma. She has no real concerning symptoms, but she did have positional vertigo for a period. She woke up one day at the end of October 2019 and couldn't keep her balance, like she was hung over. If she just sat still she was fine. If she turned her head, then things would start spinning. She saw her PCP who diagnosed BPPV. Goggles test was done and problem was diagnosed in right ear. She had PT sessions once a week and after about 3 weeks, she was back to normal. No further problems with that. No other symptoms in the last 2 years.    Today's symptoms:  Tends to have headaches at night - also has sinus issues/seasonal allergies. No dizziness since the BPPV in October 2019. No nausea/vomiting/abd pain. No fatigue. No breast tenderness (did have when she saw us before in April 2018). No galactorrhea. No hair loss. Has had some weight gain - working on it with new program. No unusual hair growth. No acne. No personal history of diabetes or diabetes in her immediate family. Does have HTN for which she takes amlodipine 5 mg daily. No change to shoe size. Regular periods recently - they occur monthly for 4-5 days at a time, LMP 1/23/2020.  No increased frequency of urination/dysuria. No constipation/diarrhea. Has mild hot flashes - manageable. Takes levothyroxine 100 mcg for hypothyroidism - has had it for 10-12 years. No skin changes - rashes or darkening. No tremors, palpitations, or numbness/tingling.     ROS:  All 12 systems were reviewed and negative except as mentioned in HPI    Past Medical/Surgical History:   Reviewed in chart  Past Medical History:   Diagnosis Date     Allergic rhinitis 8/11/2005      Anxiety disorder     Has Panic Attacks     Cervical high risk HPV (human papillomavirus) test positive 01/23/2019 01/23/19:See problem list.      Depressive disorder 1992    and NATALIE. Taking Fluoxetine (40mg)     History of prolactinoma 2005    Dr. Fischer, [facility]     Hypertension December 2016    As of late 2017, now taking Amlopidine     Mild recurrent major depression (H) 8/11/2005     Thyroid disease 2014    Hypo-thyroid. Taking Levothyroxine     Unspecified contraceptive management      Past Surgical History:   Procedure Laterality Date     BIOPSY  mid-90's    from cervix after abnormal Pap. Benign.     BUNIONECTOMY RT/LT  2005    Right Foot     BUNIONECTOMY RT/LT  2006    Left Foot      COLONOSCOPY  January 15, 2018    1 polyp - benign. Next exam in 10 years.       Allergies:  Reviewed in chart    Current Medications:   Reviewed in chart  Current Outpatient Medications   Medication Sig Dispense Refill     amLODIPine (NORVASC) 5 MG tablet TAKE 1 TABLET BY MOUTH  DAILY 90 tablet 1     cetirizine (ZYRTEC) 10 MG tablet Take 10 mg by mouth daily       cholecalciferol (VITAMIN D3) 1000 UNIT tablet Take 3 tablets (3,000 Units) by mouth daily       desonide (DESOWEN) 0.05 % external ointment Apply topically 2 times daily as needed (to affected area) up to one week at a time. 15 g 1     FLUoxetine (PROZAC) 20 MG capsule TAKE 2 CAPSULES BY MOUTH  DAILY 180 capsule 3     fluticasone (FLONASE) 50 MCG/ACT nasal spray Spray 2 sprays into both nostrils daily       levothyroxine (SYNTHROID/LEVOTHROID) 100 MCG tablet Take 1 tablet (100 mcg) by mouth daily 90 tablet 3     LORazepam (ATIVAN) 0.5 MG tablet Take 1 tablet (0.5 mg) by mouth daily as needed for anxiety 60 tablet 0     progesterone (PROMETRIUM) 100 MG capsule Take 2 capsules (200 mg) by mouth At Bedtime 180 capsule 0     VALERIAN ROOT PO Take 1,000 mg by mouth           Family History:  Reviewed in chart and discussed with patient.    No family  "history of pituitary disease, thyroid disease or early menopause.    Social History:  Discussed with patient.    Lives by herself, has a dog.  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  Drinks alcohol 1-2 drinks/week, sometimes not at all.  No current or past smoking.  No recreational drugs.    Physical Exam:  BP (!) 148/93   Pulse 74   Ht 1.676 m (5' 6\")   Wt 92.1 kg (203 lb)   BMI 32.77 kg/m       Gen: Alert, interactive, no acute distress.   Eyes: Wearing glasses. Extraocular movements intact, no lid lag. Peripheral visual fields intact. Pupils equally round and reactive to light bilaterally.  Neck: Thyroid normal size, non-tender to palpation, no nodules or masses appreciated. No cervical or supraclavicular lymphadenopathy.  CV: Regular rate and rhythm without murmurs or extra sounds.  Resp: Breathing comfortably on room air. Lungs clear to auscultation bilaterally.  Abd: No striae. Soft, non-tender, non-distended. No hepatosplenomegaly appreciated.  Ext: No lower extremity edema.  Neuro: No tremor with fingers outstretched. Reflexes 2+ bilaterally at patella and biceps. Able to walk to exam chair without difficulty.    Endocrine Labs:  4/10/2018:  Prolactin 67 (H)  IGF-1 102    10/21/2019:  TSH 1.15      Assessment and Plan:    Joan Lam is a 54 year old female with PMHx of NATALIE, depression, HTN, hypothyroidism, simple endometrial hyperplasia on progesterone, who presents for follow-up of:    Prolactinoma  Diagnosed at age 40 - has been monitored over time but never received medical treatment. Last seen in our clinic in April 2018, with prolactin elevated to 67 at that time. Last brain MRI done in 2006.  We will check her prolactin level today since it has not been checked in almost 2 years, as well as TSH and FT4 given her hypothyroidism history. Because of Joan's age and plans not to have kids, we are not as concerned with trying to treat a high prolactin level, as preserving fertility is not " necessary. Furthermore, she is not having any bothersome symptoms that would warrant treatment of a high prolactin level (no galactorrhea or breast tenderness, no vision deficits, etc.) Therefore we will likely not proceed with medical treatment (such as cabergoline) unless prolactin is significantly elevated > 100.  We will also obtain a pituitary MRI given that no imaging has been done in over 10 years, and Jona is now on a good insurance plan that makes this affordable for her.    - check prolactin, TSH, FT4  - pituitary MRI w/ and w/o contrast  - medical treatment will likely be unnecessary; will follow up as needed         Return to clinic in 1 year      I, Katie Hall, MS4, am acting as scribe for Dr. Shaunna Townsend MD.    =============================================================================    --- Addendum----  I saw the patient with medical student Katie Hall MS4 and directly examined patient and discussed. Agree above note and plan.     Prolactinoma, likely self limiting at this stage, not much benefit by treatment DA.      I spent 30 minutes with this patient face to face and explained the conditions and plans (more than 50% of time was counseling/coordination of care) . The patient understood and is satisfied with today's visit. Return to clinic with me in 1 year .     Shaunna Townsend MD  Staff Physician  Endocrinology and Metabolism  [facility]  License: [place] [id number]  Pager: [phone]

## 2020-02-05 NOTE — LETTER
2/5/2020       RE: Joan Lam  [address] [facility] [address]     Dear Colleague,    Thank you for referring your patient, Joan Lam, to the [facility] ENDOCRINOLOGY at [facility]. Please see a copy of my visit note below.      Endocrinology Clinic Follow-Up Visit  Service Date: 02/05/20      Joan Lam : MRN:[id number]  :YOB: 1965  Primary care provider: Doreen Aguila   Attending Physician: Dr. Townsend    Reason for Endocrine consult: Follow-up on prolactinoma      HPI:    Joan Lam is a 54 year old female who is here for follow-up on her prolactinoma. PMHx also includes NATALIE, depression, HTN, simple endometrial hyperplasia, and hypothyroidism    She has remote history of a micro-prolactinoma diagnosed at age 40, at which time she had galactorrhea and amenorrhea. Her prolactin level was in the 60s-70s range, and a brain MRI showed microadenoma per her report. She did not have any medical treatment at the time, and prolactin level spontaneously decreased. She stopped seeing her endocrinologist at the time.    She did continue to follow with her PCP who was tracking prolactin level yearly. In 2015 prolactin was 42, then in November 2017 increased to 96.1, along with breast tenderness (no galactorrhea).     She came to our clinic for evaluation on 4/10/2018, at which time she reported breast tenderness. Prolactin level was elevated to 67 at that time. She was prescribed cabergoline 0.25 mg once a week and brain MRI was ordered, but due to job and insurance changes she did not end up getting the MRI or cabergoline.     She also notes taking progesterone -300 mg daily for hot flashes, which she started in 2010. However she wasn't taking it as regularly as she was prescribed because she was in between jobs/insurance. She notes that she hadn't had her period [id number], then in 2018 started having symptoms (cramps), and then  started having vaginal bleeding again. She thought this was weird since she hadn't had her period in so long. Her other doctor did some tests and found out it was related to taking progesterone sporadically due to her insurance issues. An endometrial biopsy and ultrasound was done in 2018, and she was diagnosed with simple endometrial hyperplasia. She is still taking progesterone regularly 200 mg daily. Now she has regular periods.    Regarding what's bringing her in today:  She now has different health insurance so she's able to come in. She's just here to follow through on monitoring for prolactinoma. She has no real concerning symptoms, but she did have positional vertigo for a period. She woke up one day at the end of October 2019 and couldn't keep her balance, like she was hung over. If she just sat still she was fine. If she turned her head, then things would start spinning. She saw her PCP who diagnosed BPPV. Goggles test was done and problem was diagnosed in right ear. She had PT sessions once a week and after about 3 weeks, she was back to normal. No further problems with that. No other symptoms in the last 2 years.    Today's symptoms:  Tends to have headaches at night - also has sinus issues/seasonal allergies. No dizziness since the BPPV in October 2019. No nausea/vomiting/abd pain. No fatigue. No breast tenderness (did have when she saw us before in April 2018). No galactorrhea. No hair loss. Has had some weight gain - working on it with new program. No unusual hair growth. No acne. No personal history of diabetes or diabetes in her immediate family. Does have HTN for which she takes amlodipine 5 mg daily. No change to shoe size. Regular periods recently - they occur monthly for 4-5 days at a time, LMP 1/23/2020.  No increased frequency of urination/dysuria. No constipation/diarrhea. Has mild hot flashes - manageable. Takes levothyroxine 100 mcg for hypothyroidism - has had it for 10-12 years. No skin  changes - rashes or darkening. No tremors, palpitations, or numbness/tingling.     ROS:  All 12 systems were reviewed and negative except as mentioned in HPI    Past Medical/Surgical History:   Reviewed in chart  Past Medical History:   Diagnosis Date     Allergic rhinitis 8/11/2005     Anxiety disorder     Has Panic Attacks     Cervical high risk HPV (human papillomavirus) test positive 01/23/2019 01/23/19:See problem list.      Depressive disorder 1992    and NATALIE. Taking Fluoxetine (40mg)     History of prolactinoma 2005    Dr. Fischer, [facility]     Hypertension December 2016    As of late 2017, now taking Amlopidine     Mild recurrent major depression (H) 8/11/2005     Thyroid disease 2014    Hypo-thyroid. Taking Levothyroxine     Unspecified contraceptive management      Past Surgical History:   Procedure Laterality Date     BIOPSY  mid-90's    from cervix after abnormal Pap. Benign.     BUNIONECTOMY RT/LT  2005    Right Foot     BUNIONECTOMY RT/LT  2006    Left Foot      COLONOSCOPY  January 15, 2018    1 polyp - benign. Next exam in 10 years.       Allergies:  Reviewed in chart    Current Medications:   Reviewed in chart  Current Outpatient Medications   Medication Sig Dispense Refill     amLODIPine (NORVASC) 5 MG tablet TAKE 1 TABLET BY MOUTH  DAILY 90 tablet 1     cetirizine (ZYRTEC) 10 MG tablet Take 10 mg by mouth daily       cholecalciferol (VITAMIN D3) 1000 UNIT tablet Take 3 tablets (3,000 Units) by mouth daily       desonide (DESOWEN) 0.05 % external ointment Apply topically 2 times daily as needed (to affected area) up to one week at a time. 15 g 1     FLUoxetine (PROZAC) 20 MG capsule TAKE 2 CAPSULES BY MOUTH  DAILY 180 capsule 3     fluticasone (FLONASE) 50 MCG/ACT nasal spray Spray 2 sprays into both nostrils daily       levothyroxine (SYNTHROID/LEVOTHROID) 100 MCG tablet Take 1 tablet (100 mcg) by mouth daily 90 tablet 3     LORazepam (ATIVAN) 0.5 MG tablet Take 1 tablet (0.5 mg) by  "mouth daily as needed for anxiety 60 tablet 0     progesterone (PROMETRIUM) 100 MG capsule Take 2 capsules (200 mg) by mouth At Bedtime 180 capsule 0     VALERIAN ROOT PO Take 1,000 mg by mouth           Family History:  Reviewed in chart and discussed with patient.    No family history of pituitary disease, thyroid disease or early menopause.    Social History:  Discussed with patient.    Lives by herself, has a dog.  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  Drinks alcohol 1-2 drinks/week, sometimes not at all.  No current or past smoking.  No recreational drugs.    Physical Exam:  BP (!) 148/93   Pulse 74   Ht 1.676 m (5' 6\")   Wt 92.1 kg (203 lb)   BMI 32.77 kg/m        Gen: Alert, interactive, no acute distress.   Eyes: Wearing glasses. Extraocular movements intact, no lid lag. Peripheral visual fields intact. Pupils equally round and reactive to light bilaterally.  Neck: Thyroid normal size, non-tender to palpation, no nodules or masses appreciated. No cervical or supraclavicular lymphadenopathy.  CV: Regular rate and rhythm without murmurs or extra sounds.  Resp: Breathing comfortably on room air. Lungs clear to auscultation bilaterally.  Abd: No striae. Soft, non-tender, non-distended. No hepatosplenomegaly appreciated.  Ext: No lower extremity edema.  Neuro: No tremor with fingers outstretched. Reflexes 2+ bilaterally at patella and biceps. Able to walk to exam chair without difficulty.    Endocrine Labs:  4/10/2018:  Prolactin 67 (H)  IGF-1 102    10/21/2019:  TSH 1.15      Assessment and Plan:    Joan Lam is a 54 year old female with PMHx of NATALIE, depression, HTN, hypothyroidism, simple endometrial hyperplasia on progesterone, who presents for follow-up of:    Prolactinoma  Diagnosed at age 40 - has been monitored over time but never received medical treatment. Last seen in our clinic in April 2018, with prolactin elevated to 67 at that time. Last brain MRI done in 2006.  We will check her " prolactin level today since it has not been checked in almost 2 years, as well as TSH and FT4 given her hypothyroidism history. Because of Joan's age and plans not to have kids, we are not as concerned with trying to treat a high prolactin level, as preserving fertility is not necessary. Furthermore, she is not having any bothersome symptoms that would warrant treatment of a high prolactin level (no galactorrhea or breast tenderness, no vision deficits, etc.) Therefore we will likely not proceed with medical treatment (such as cabergoline) unless prolactin is significantly elevated > 100.  We will also obtain a pituitary MRI given that no imaging has been done in over 10 years, and Joan is now on a good insurance plan that makes this affordable for her.    - check prolactin, TSH, FT4  - pituitary MRI w/ and w/o contrast  - medical treatment will likely be unnecessary; will follow up as needed         Return to clinic in 1 year      I, Katie Hall, MS4, am acting as scribe for Dr. Shaunna Townsend MD.    =============================================================================    --- Addendum----  I saw the patient with medical student Katie Hall MS4 and directly examined patient and discussed. Agree above note and plan.     Prolactinoma, likely self limiting at this stage, not much benefit by treatment DA.      I spent 30 minutes with this patient face to face and explained the conditions and plans (more than 50% of time was counseling/coordination of care) . The patient understood and is satisfied with today's visit. Return to clinic with me in 1 year .     Shaunna Townsend MD  Staff Physician  Endocrinology and Metabolism  [facility]  License: [place] [id number]  Pager: [phone]

## 2020-02-05 NOTE — PROGRESS NOTES
"Joan Lam is a 54 year old year old woman who presents for repeat pap smear.  See problem list, Pap one year ago was NIL/other HPV +.  Endometrial biopsy at that time showed benign nonphasic endometrium, negative for hyperplasia, atypia, or malignancy.  Prior endometrial biopsy 8/14/2018 that showed simple endometrial hyperplasia.  She continues Prometrium 200 mg daily, tolerates this well.  She continues to have light periods, fairly regular.  There is a family history of late menopause.  She denies abnormal bleeding, abnormal vaginal discharge, pelvic pain, GI symptoms.      Past medical, surgical, family, and social history have been noted.      ROS:  See above re /GI .  Denies fevers, skin lesions.     OBJECTIVE: Healthy female in NAD.  BP (!) 148/93   Pulse 74   Ht 1.676 m (5' 6\")   Wt 92.1 kg (203 lb)   BMI 32.77 kg/m       Abdomen soft, non-tender, without mass.      Pelvic exam:  External genitalia appeared normal without lesion.  Urethral meatus, perineum, and anus appeared normal. Cervix and vagina appeared normal, without lesion.  Pap was sent.  Bimanual examination no pelvic masses nor tenderness.    ASSESSMENT:  NIL Pap, other HPV positive.       PLAN: if pap is normal/HPV negative, reassess per guidelines, would anticipate repeat Pap/HPV test in 1 year.  "

## 2020-02-10 LAB
COPATH REPORT: NORMAL
PAP: NORMAL

## 2020-03-11 ENCOUNTER — OFFICE VISIT (OUTPATIENT)
Dept: OBGYN | Facility: CLINIC | Age: 55
End: 2020-03-11
Payer: COMMERCIAL

## 2020-03-11 VITALS
HEART RATE: 81 BPM | WEIGHT: 202 LBS | DIASTOLIC BLOOD PRESSURE: 93 MMHG | BODY MASS INDEX: 32.6 KG/M2 | SYSTOLIC BLOOD PRESSURE: 142 MMHG | TEMPERATURE: 98 F

## 2020-03-11 DIAGNOSIS — B97.7 HPV IN FEMALE: Primary | ICD-10-CM

## 2020-03-11 DIAGNOSIS — N85.01 SIMPLE ENDOMETRIAL HYPERPLASIA: ICD-10-CM

## 2020-03-11 PROCEDURE — 57456 ENDOCERV CURETTAGE W/SCOPE: CPT | Performed by: OBSTETRICS & GYNECOLOGY

## 2020-03-11 PROCEDURE — 88305 TISSUE EXAM BY PATHOLOGIST: CPT | Mod: 26 | Performed by: OBSTETRICS & GYNECOLOGY

## 2020-03-11 PROCEDURE — 88305 TISSUE EXAM BY PATHOLOGIST: CPT | Performed by: OBSTETRICS & GYNECOLOGY

## 2020-03-11 PROCEDURE — 58110 BX DONE W/COLPOSCOPY ADD-ON: CPT | Performed by: OBSTETRICS & GYNECOLOGY

## 2020-03-11 NOTE — PROGRESS NOTES
INDICATION: Joan Lam is a 54 year old female who presents for colposcopy.  Pap smear was reported as NIL, othr HPV + (same as last year, endometrial biopsy had showed benign nonphasic endometrium.  Endometrial biopsy 8/14/2018 had  showed simple endometrial hyperplasia.  She has continued Prometrium 200 mg daily, has monthly bleeding which seems like a menstrual period.      We discussed options, we will proceed with colposcopy today, and endometrial biopsy.         Informed consent obtained for procedures.               COLPOSCOPIC EXAM:  Cervix is fully visualized.  Squamocolumnar junction is not fully visualized.      Using standard technique and acetic acid application, the cervix and vagina were examined using the colposcope.  The transformation zone appeared visible.  No epithelial lesions were seen.    The cervix was grasped anteriorly with a tenaculum.  A tapered blue dilator was used to dilate the cervical os.  ECC was performed.  Informed consent was obtained for endometrial biopsy.  Using standard technique and hibiclens prep of the cervix and vagina, pipelle aspiration of the endometrium was obtained. The uterus sounded to 7 cm.  Tissue was obtained, placed in a container with formalin, labeled, and submitted to pathology.    The patient tolerated the procedure well.  Blood loss was less than one cc.  Standard post-biopsy precautions were given.    ASSESSMENT:  NIL pap, other HPV negative.  Normal-appearing cervix with invisible transformation zone.  History of simple endometrial hyperplasia, benign follow-up biopsy, continues progesterone orally.    PLAN:  Call for ECC and endometrial biopsy results in 1 week.  Will re-evaluate depending on findings.  If benign, she may observe, repeat Pap and HPV test in 1 year.  She is aware that I will be out of the office next week.

## 2020-03-11 NOTE — NURSING NOTE
"Chief Complaint   Patient presents with     Colposcopy       Initial BP (!) 142/93 (BP Location: Left arm, Patient Position: Sitting, Cuff Size: Adult Regular)   Pulse 81   Temp 98  F (36.7  C) (Oral)   Wt 91.6 kg (202 lb)   BMI 32.60 kg/m   Estimated body mass index is 32.6 kg/m  as calculated from the following:    Height as of 20: 1.676 m (5' 6\").    Weight as of this encounter: 91.6 kg (202 lb).  BP completed using cuff size: regular    Questioned patient about current smoking habits.  Pt. has never smoked.          The following HM Due: NONE      The following patient reported/Care Every where data was sent to:  P ABSTRACT QUALITY INITIATIVES [[id number]]  ISMAEL Guzman MA           "

## 2020-03-15 LAB — COPATH REPORT: NORMAL

## 2020-04-24 DIAGNOSIS — N85.01 ENDOMETRIAL HYPERPLASIA, SIMPLE: ICD-10-CM

## 2020-04-24 NOTE — TELEPHONE ENCOUNTER
Pending Prescriptions:                       Disp   Refills    progesterone (PROMETRIUM) 100 MG capsule  180 ca*2            Sig: Take 2 capsules (200 mg) by mouth At Bedtime    Last OV was 2/5/2020. Up to date. Refill sent to pharmacy.   Rachael Grove, RN-BSN

## 2020-05-08 DIAGNOSIS — E03.9 HYPOTHYROIDISM, UNSPECIFIED TYPE: ICD-10-CM

## 2020-05-12 RX ORDER — LEVOTHYROXINE SODIUM 100 UG/1
TABLET ORAL
Qty: 90 TABLET | Refills: 2 | Status: SHIPPED | OUTPATIENT
Start: 2020-05-12 | End: 2021-01-15

## 2020-05-12 NOTE — TELEPHONE ENCOUNTER
Levothyroxine Refilled per [facility] [place].    Esme Grove RN

## 2020-08-02 DIAGNOSIS — I10 ESSENTIAL HYPERTENSION WITH GOAL BLOOD PRESSURE LESS THAN 140/90: ICD-10-CM

## 2020-08-05 RX ORDER — AMLODIPINE BESYLATE 5 MG/1
TABLET ORAL
Qty: 90 TABLET | Refills: 1 | Status: SHIPPED | OUTPATIENT
Start: 2020-08-05 | End: 2021-01-15

## 2020-08-05 NOTE — TELEPHONE ENCOUNTER
BP Readings from Last 6 Encounters:   03/11/20 (!) 142/93   02/05/20 (!) 148/93   02/05/20 (!) 148/93   01/30/20 108/74   11/11/19 130/88   10/21/19 130/88     BP's from 2/5/20 and 3/11/20 were at GYN office.  3/11/20 was colposcopy.

## 2020-09-03 ENCOUNTER — VIRTUAL VISIT (OUTPATIENT)
Dept: FAMILY MEDICINE | Facility: CLINIC | Age: 55
End: 2020-09-03
Payer: COMMERCIAL

## 2020-09-03 ENCOUNTER — E-VISIT (OUTPATIENT)
Dept: FAMILY MEDICINE | Facility: CLINIC | Age: 55
End: 2020-09-03

## 2020-09-03 DIAGNOSIS — Z53.9 ERRONEOUS ENCOUNTER--DISREGARD: Primary | ICD-10-CM

## 2020-09-03 DIAGNOSIS — F41.1 GAD (GENERALIZED ANXIETY DISORDER): ICD-10-CM

## 2020-09-03 PROCEDURE — 99213 OFFICE O/P EST LOW 20 MIN: CPT | Mod: 95 | Performed by: FAMILY MEDICINE

## 2020-09-03 RX ORDER — LORAZEPAM 0.5 MG/1
0.5 TABLET ORAL DAILY PRN
Qty: 60 TABLET | Refills: 0 | Status: SHIPPED | OUTPATIENT
Start: 2020-09-03 | End: 2021-02-01

## 2020-09-03 NOTE — PROGRESS NOTES
"Joan Lam is a 55 year old female who is being evaluated via a billable video visit.      The patient has been notified of following:     \"This video visit will be conducted via a call between you and your physician/provider. We have found that certain health care needs can be provided without the need for an in-person physical exam.  This service lets us provide the care you need with a video conversation.  If a prescription is necessary we can send it directly to your pharmacy.  If lab work is needed we can place an order for that and you can then stop by our lab to have the test done at a later time.    Video visits are billed at different rates depending on your insurance coverage.  Please reach out to your insurance provider with any questions.    If during the course of the call the physician/provider feels a video visit is not appropriate, you will not be charged for this service.\"    Patient has given verbal consent for Video visit? Yes  How would you like to obtain your AVS? MyChart  If you are dropped from the video visit, the video invite should be resent to: Text to cell phone: [phone]  Will anyone else be joining your video visit? No      Subjective     Joan Lam is a 55 year old female who presents today via video visit for the following health issues:    HPI    Depression and Anxiety Follow-Up    How are you doing with your depression since your last visit? No change    How are you doing with your anxiety since your last visit?  Worsened     Are you having other symptoms that might be associated with depression or anxiety? No    Have you had a significant life event? No     Do you have any concerns with your use of alcohol or other drugs? No     Managing depression and anxiety alright despite global and local stressors (COVID-19 pandemic, etc).  Last refill of #60 lorazepam has lasted her nearly 8 months.  She'd like a refill.  She's also interested in cognitive behavioral therapy.  " Has been to a therapist in the past but not for a long time.    Social History     Tobacco Use     Smoking status: Never Smoker     Smokeless tobacco: Never Used   Substance Use Topics     Alcohol use: Not Currently     Frequency: 2-4 times a month     Comment: Less than 4 servings (drinks) per month     Drug use: No     PHQ 10/21/2019 1/30/2020 9/3/2020   PHQ-9 Total Score 0 1 3   Q9: Thoughts of better off dead/self-harm past 2 weeks Not at all Not at all Not at all     NATALIE-7 SCORE 10/21/2019 1/30/2020 9/3/2020   Total Score - - -   Total Score - 3 (minimal anxiety) 5 (mild anxiety)   Total Score 0 3 5         Suicide Assessment Five-step Evaluation and Treatment (SAFE-T)      How many servings of fruits and vegetables do you eat daily?  2-3    On average, how many sweetened beverages do you drink each day (Examples: soda, juice, sweet tea, etc.  Do NOT count diet or artificially sweetened beverages)?   0    How many days per week do you exercise enough to make your heart beat faster? 5    How many minutes a day do you exercise enough to make your heart beat faster? 20 - 29    How many days per week do you miss taking your medication? 0         Video Start Time: 1:49 PM      Review of Systems         Objective           Vitals:  No vitals were obtained today due to virtual visit.    Physical Exam     GENERAL: Healthy, alert and no distress  EYES: Eyes grossly normal to inspection.  No discharge or erythema, or obvious scleral/conjunctival abnormalities.  RESP: No audible wheeze, cough, or visible cyanosis.  No visible retractions or increased work of breathing.    SKIN: Visible skin clear. No significant rash, abnormal pigmentation or lesions.  NEURO: Cranial nerves grossly intact.  Mentation and speech appropriate for age.  PSYCH: Mentation appears normal, affect normal/bright, judgement and insight intact, normal speech and appearance well-groomed.              Assessment & Plan     NATALIE (generalized anxiety  disorder)  Reviewed self-cares, especially as we go into fall/winter during COVID-19 pandemic. Exercise (she has home treadmill) and maintain social connections.  I recommended she schedule with Nahum Davila B[facility] at [facility] to discuss CBT options.  - LORazepam (ATIVAN) 0.5 MG tablet  Dispense: 60 tablet; Refill: 0           Patient Instructions   Schedule a virtual (telehealth) visit with Nahum Davila B[facility] at [facility].  He does CBT or can refer you to another therapist for CBT.      No follow-ups on file.    Doreen Aguila MD  [facility]      Video-Visit Details    Type of service:  Video Visit    Video End Time:1:57 PM    Originating Location (pt. Location): Home    Distant Location (provider location):  [facility]     Platform [facility] for Video Visit: [facility]

## 2020-09-03 NOTE — PATIENT INSTRUCTIONS
Schedule a virtual (telehealth) visit with Nahum Davila [facility] at [facility].  He does CBT or can refer you to another therapist for CBT.

## 2020-11-16 ENCOUNTER — HEALTH MAINTENANCE LETTER (OUTPATIENT)
Age: 55
End: 2020-11-16

## 2020-12-29 DIAGNOSIS — F41.1 GAD (GENERALIZED ANXIETY DISORDER): ICD-10-CM

## 2020-12-29 DIAGNOSIS — F33.42 MAJOR DEPRESSIVE DISORDER, RECURRENT EPISODE, IN FULL REMISSION (H): ICD-10-CM

## 2020-12-31 DIAGNOSIS — N85.01 ENDOMETRIAL HYPERPLASIA, SIMPLE: ICD-10-CM

## 2021-01-11 DIAGNOSIS — E03.9 HYPOTHYROIDISM, UNSPECIFIED TYPE: ICD-10-CM

## 2021-01-11 DIAGNOSIS — I10 ESSENTIAL HYPERTENSION WITH GOAL BLOOD PRESSURE LESS THAN 140/90: ICD-10-CM

## 2021-01-15 RX ORDER — AMLODIPINE BESYLATE 5 MG/1
TABLET ORAL
Qty: 90 TABLET | Refills: 0 | Status: SHIPPED | OUTPATIENT
Start: 2021-01-15 | End: 2021-04-07

## 2021-01-15 RX ORDER — LEVOTHYROXINE SODIUM 100 UG/1
TABLET ORAL
Qty: 90 TABLET | Refills: 0 | Status: SHIPPED | OUTPATIENT
Start: 2021-01-15 | End: 2021-04-07

## 2021-01-15 NOTE — TELEPHONE ENCOUNTER
BP Readings from Last 6 Encounters:   03/11/20 (!) 142/93   02/05/20 (!) 148/93   02/05/20 (!) 148/93   01/30/20 108/74   11/11/19 130/88   10/21/19 130/88     Last Comprehensive Metabolic Panel:  Sodium   Date Value Ref Range Status   10/21/2019 138 133 - 144 mmol/L Final     Potassium   Date Value Ref Range Status   10/21/2019 4.0 3.4 - 5.3 mmol/L Final     Chloride   Date Value Ref Range Status   10/21/2019 106 94 - 109 mmol/L Final     Carbon Dioxide   Date Value Ref Range Status   10/21/2019 23 20 - 32 mmol/L Final     Anion Gap   Date Value Ref Range Status   10/21/2019 9 3 - 14 mmol/L Final     Glucose   Date Value Ref Range Status   10/21/2019 98 70 - 99 mg/dL Final     Comment:     Non Fasting     Urea Nitrogen   Date Value Ref Range Status   10/21/2019 9 7 - 30 mg/dL Final     Creatinine   Date Value Ref Range Status   10/21/2019 0.62 0.52 - 1.04 mg/dL Final     GFR Estimate   Date Value Ref Range Status   10/21/2019 >90 >60 mL/min/[1.73_m2] Final     Comment:     Non  GFR Calc  Starting 12/18/2018, serum creatinine based estimated GFR (eGFR) will be   calculated using the Chronic Kidney Disease Epidemiology Collaboration   (CKD-EPI) equation.       Calcium   Date Value Ref Range Status   10/21/2019 8.6 8.5 - 10.1 mg/dL Final       TSH   Date Value Ref Range Status   02/05/2020 1.00 0.40 - 4.00 mU/L Final

## 2021-01-15 NOTE — TELEPHONE ENCOUNTER
Reception, Please schedule patient for routine (annual) preventive visit and HTN/Thyroid Follow-Up with me.  Please let her know she is overdue for preventive care and monitoring labs.

## 2021-01-28 ENCOUNTER — HOSPITAL ENCOUNTER (OUTPATIENT)
Dept: MAMMOGRAPHY | Facility: CLINIC | Age: 56
Discharge: HOME | End: 2021-01-28
Attending: FAMILY MEDICINE | Admitting: FAMILY MEDICINE
Payer: COMMERCIAL

## 2021-01-28 DIAGNOSIS — Z12.31 VISIT FOR SCREENING MAMMOGRAM: ICD-10-CM

## 2021-01-28 DIAGNOSIS — F41.1 GAD (GENERALIZED ANXIETY DISORDER): ICD-10-CM

## 2021-01-28 PROCEDURE — 77063 BREAST TOMOSYNTHESIS BI: CPT

## 2021-02-01 RX ORDER — LORAZEPAM 0.5 MG/1
0.5 TABLET ORAL DAILY PRN
Qty: 60 TABLET | Refills: 0 | Status: SHIPPED | OUTPATIENT
Start: 2021-02-01 | End: 2021-07-23

## 2021-02-01 NOTE — TELEPHONE ENCOUNTER
Routing refill request to provider for review/approval because:  --Drug not on the [facility] refill protocol LORazepam (ATIVAN) 0.5 MG         --Last visit:  9/3/2020 [facility]3] for  anxiety.    --Future Visit:   Next 5 appointments (look out 90 days)    Mar 03, 2021 10:30 AM  Office Visit with Hayley Low MD  [facility] ([facility] [address] [place] [id number] [phone]            Last order -    Disp Refills Start End MANDY   LORazepam (ATIVAN) 0.5 MG tablet 60 tablet 0 9/3/2020  No   Sig - Route: Take 1 tablet (0.5 mg) by mouth daily as needed for anxiety - Oral

## 2021-02-24 ENCOUNTER — PATIENT OUTREACH (OUTPATIENT)
Dept: OBGYN | Facility: CLINIC | Age: 56
End: 2021-02-24

## 2021-02-24 DIAGNOSIS — R87.810 CERVICAL HIGH RISK HPV (HUMAN PAPILLOMAVIRUS) TEST POSITIVE: ICD-10-CM

## 2021-02-24 NOTE — TELEPHONE ENCOUNTER
Hi Dr. Low,  See the pt's Pap hx listed below. Would you like the pt to return now for a cotest since it has been a year since her last one or would you like a cotest and EMB now? Please advise.     Pap Hx:  01/23/19: NIL pap, + HR HPV (not 16 or 18) result, EMB neg for dysplasia. Plan cotest in 1 year.   02/5/20: NIL pap, + HR HPV (not 16 or 18) result. Plan [place].  3/11/20 colp bx and ECC - no dysplasia. Plan: Would advise Mirena IUD, will likely plan repeat endometrial biopsy at the time of placement.

## 2021-03-02 NOTE — TELEPHONE ENCOUNTER
Patient due for Pap and HPV and Endometrial Biopsy.    Reminder done today via MyChart and letter.

## 2021-03-03 ENCOUNTER — OFFICE VISIT (OUTPATIENT)
Dept: OBGYN | Facility: CLINIC | Age: 56
End: 2021-03-03
Payer: COMMERCIAL

## 2021-03-03 VITALS
WEIGHT: 201 LBS | BODY MASS INDEX: 32.3 KG/M2 | DIASTOLIC BLOOD PRESSURE: 91 MMHG | OXYGEN SATURATION: 96 % | HEIGHT: 66 IN | HEART RATE: 90 BPM | SYSTOLIC BLOOD PRESSURE: 150 MMHG

## 2021-03-03 DIAGNOSIS — Z01.411 ENCOUNTER FOR GYNECOLOGICAL EXAMINATION WITH ABNORMAL FINDING: Primary | ICD-10-CM

## 2021-03-03 DIAGNOSIS — Z12.4 SCREENING FOR MALIGNANT NEOPLASM OF CERVIX: ICD-10-CM

## 2021-03-03 DIAGNOSIS — N85.01 SIMPLE ENDOMETRIAL HYPERPLASIA: ICD-10-CM

## 2021-03-03 PROCEDURE — 58100 BIOPSY OF UTERUS LINING: CPT | Performed by: OBSTETRICS & GYNECOLOGY

## 2021-03-03 PROCEDURE — 99396 PREV VISIT EST AGE 40-64: CPT | Mod: 25 | Performed by: OBSTETRICS & GYNECOLOGY

## 2021-03-03 PROCEDURE — 88305 TISSUE EXAM BY PATHOLOGIST: CPT | Performed by: PATHOLOGY

## 2021-03-03 PROCEDURE — 87624 HPV HI-RISK TYP POOLED RSLT: CPT | Performed by: OBSTETRICS & GYNECOLOGY

## 2021-03-03 SDOH — HEALTH STABILITY: MENTAL HEALTH: HOW MANY STANDARD DRINKS CONTAINING ALCOHOL DO YOU HAVE ON A TYPICAL DAY?: NOT ASKED

## 2021-03-03 SDOH — HEALTH STABILITY: MENTAL HEALTH: HOW OFTEN DO YOU HAVE 6 OR MORE DRINKS ON ONE OCCASION?: NOT ASKED

## 2021-03-03 SDOH — HEALTH STABILITY: MENTAL HEALTH: HOW OFTEN DO YOU HAVE A DRINK CONTAINING ALCOHOL?: NOT ASKED

## 2021-03-03 ASSESSMENT — MIFFLIN-ST. JEOR: SCORE: 1533.48

## 2021-03-03 NOTE — PROGRESS NOTES
Joan Lam is a 55 year old  female who presents for annual exam.  She has a history of simple endometrial hyperplasia without atypia, most recent biopsy was 3/11/2020 continued to show this.  Colposcopy was done at the same time, ECC was benign.  She continues Prometrium 200 mg daily.  Given the persistence of the hyperplasia, placement of Mirena IUD has been discussed.  Given the time lapse (secondary to the Covid pandemic) we will plan to send a Pap and endometrial biopsy today, then reassess treatment.    Menses are regular q 28-30 days and normal lasting 4 days.  Menses flow: normal.  Patient's last menstrual period was 2021.. Using none for contraception.  She is not currently considering pregnancy.  Besides routine health maintenance, she has no other health concerns today .  GYNECOLOGIC HISTORY:  Menarche:   Joan is not sexually active  History sexually transmitted infections:HPV  STI testing offered?  Declined  KAITLIN exposure: Unknown  History of abnormal Pap smear: YES - updated in Problem List and Health Maintenance accordingly  Family history of breast CA: Yes (Please explain): mom, mgm and m-aunt  Family history of uterine/ovarian CA: No    Family history of colon CA: Yes (Please explain): mgm    HEALTH MAINTENANCE:  Cholesterol: (  Cholesterol   Date Value Ref Range Status   2019 136 <200 mg/dL Final   2013 142 115 - 199 mg/dL Final    History of abnormal lipids: No  Mammo: 23 . History of abnormal Mammo: No.  Regular Self Breast Exams: Yes  Calcium/Vitamin D intake: source:  dairy, vit d  Adequate? Yes  TSH: (  TSH   Date Value Ref Range Status   2020 1.00 0.40 - 4.00 mU/L Final    )  Pap; (  Lab Results   Component Value Date    PAP NIL 2020    PAP NIL 2019    )    HISTORY:  OB History    Para Term  AB Living   0 0 0 0 0 0   SAB TAB Ectopic Multiple Live Births   0 0 0 0 0     Past Medical History:   Diagnosis Date     Allergic  rhinitis 8/11/2005     Anxiety disorder     Has Panic Attacks     Cervical high risk HPV (human papillomavirus) test positive 01/23/2019, 02/05/20    See problem list.      Depressive disorder 1992    and NATALIE. Taking Fluoxetine (40mg)     History of prolactinoma 2005    Dr. 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 Taking Levothyroxine     Unspecified contraceptive management      Past Surgical History:   Procedure Laterality Date     BIOPSY  mid-90's    from cervix after abnormal Pap. Benign.     BUNIONECTOMY RT/LT  2005    Right Foot     BUNIONECTOMY RT/LT  2006    Left Foot      COLONOSCOPY  January 15, 2018    1 polyp - benign. Next exam in 10 years.     Family History   Problem Relation Age of Onset     Hypertension Father      Breast Cancer Mother         Cancer-free for 2 years     Cerebrovascular Disease Maternal Grandmother      Breast Cancer Maternal Grandmother      Colon Cancer Maternal Grandmother      Substance Abuse Maternal Grandfather      Anxiety Disorder Paternal Grandmother      Depression Paternal Grandfather      Breast Cancer Other         Mat. Aunt - cancer free now     Substance Abuse Brother      Social History     Socioeconomic History     Marital status: Single     Spouse name: Single     Number of children: 0     Years of education: Not on file     Highest education level: Not on file   Occupational History     Occupation:      Employer: [employer]   Social Needs     Financial resource strain: Not on file     Food insecurity     Worry: Not on file     Inability: Not on file     Transportation needs     Medical: Not on file     Non-medical: Not on file   Tobacco Use     Smoking status: Never Smoker     Smokeless tobacco: Never Used   Substance and Sexual Activity     Alcohol use: Not Currently     Frequency:  2-4 times a month     Comment: Less than 4 servings (drinks) per month     Drug use: No     Sexual activity: Never   Lifestyle     Physical activity     Days per week: Not on file     Minutes per session: Not on file     Stress: Not on file   Relationships     Social connections     Talks on phone: Not on file     Gets together: Not on file     Attends [employer] service: Not on file     Active member of club or organization: Not on file     Attends meetings of clubs or organizations: Not on file     Relationship status: Not on file     Intimate partner violence     Fear of current or ex partner: Not on file     Emotionally abused: Not on file     Physically abused: Not on file     Forced sexual activity: Not on file   Other Topics Concern     Parent/sibling w/ CABG, MI or angioplasty before 65F 55M? 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[employer], desk job, a lot of computer work, lives with her dog       Current Outpatient Medications:      amLODIPine (NORVASC) 5 MG tablet, TAKE 1 TABLET BY MOUTH  DAILY, Disp: 90 tablet, Rfl: 0     cetirizine (ZYRTEC) 10 MG tablet, Take 10 mg by mouth daily, Disp: , Rfl:      cholecalciferol (VITAMIN D3) 1000 UNIT tablet, Take 3 tablets (3,000 Units) by mouth daily, Disp: , Rfl:      desonide (DESOWEN) 0.05 % external ointment, Apply topically 2 times daily as needed (to affected area) up to one week at a time., Disp: 15 g, Rfl: 1     FLUoxetine (PROZAC) 20 MG capsule, TAKE 2 CAPSULES BY MOUTH  DAILY, Disp: 180 capsule, Rfl: 3     fluticasone (FLONASE) 50 MCG/ACT nasal spray, Spray 2 sprays into both nostrils daily, Disp: , Rfl:      levothyroxine (SYNTHROID/LEVOTHROID) 100 MCG tablet, TAKE 1 TABLET BY MOUTH  DAILY, Disp: 90 tablet, Rfl: 0     LORazepam (ATIVAN) 0.5 MG tablet, Take 1 tablet (0.5 mg) by mouth daily as needed for anxiety, Disp: 60 tablet, Rfl: 0     progesterone (PROMETRIUM) 100 MG capsule, Take 2 capsules (200 mg) by mouth At  "Bedtime Schedule annual for further refills, Disp: 180 capsule, Rfl: 0     VALERIAN ROOT PO, Take 1,000 mg by mouth, Disp: , Rfl:      Allergies   Allergen Reactions     No Known Drug Allergies        Past medical, surgical, social and family history were reviewed and updated in EPIC.    ROS:   C:     NEGATIVE for fever, chills, change in weight  I:       NEGATIVE for worrisome rashes, moles or lesions  E:     NEGATIVE for vision changes or irritation  E/M: NEGATIVE for ear, mouth and throat problems  R:     NEGATIVE for significant cough or SOB  CV:   NEGATIVE for chest pain, palpitations or peripheral edema  GI:     NEGATIVE for nausea, abdominal pain, heartburn, or change in bowel habits  :   NEGATIVE for frequency, dysuria, hematuria, vaginal discharge, or irregular bleeding  M:     NEGATIVE for significant arthralgias or myalgia  N:      NEGATIVE for weakness, dizziness or paresthesias  E:      NEGATIVE for temperature intolerance, skin/hair changes  P:      NEGATIVE for changes in mood or affect.    EXAM:  BP (!) 150/91   Pulse 90   Ht 1.676 m (5' 6\")   Wt 91.2 kg (201 lb)   LMP 02/17/2021   SpO2 96%   BMI 32.44 kg/m     BMI: Body mass index is 32.44 kg/m .  Constitutional: healthy, alert and no distress  Head: Normocephalic. No masses, lesions, tenderness or abnormalities  Neck: Neck supple. Trachea midline. No adenopathy. Thyroid symmetric, normal size.   Cardiovascular: RRR.   Respiratory: Negative.   Breast: No nodularity, asymmetry or nipple discharge bilaterally.  Gastrointestinal: Abdomen soft, non-tender, non-distended. No masses, organomegaly.  :  Vulva:  No external lesions, normal female hair distribution, no inguinal adenopathy.    Urethra:  Midline, non-tender, well supported, no discharge  Vagina:  Moist, pink, no abnormal discharge, no lesions  Uterus:  Normal size, non-tender, freely mobile  Ovaries:  No masses appreciated, non-tender, mobile  Rectal Exam: deferred  Musculoskeletal: " extremities normal  Skin: no suspicious lesions or rashes  Psychiatric: Affect appropriate, cooperative,mentation appears normal.     Informed consent was obtained for endometrial biopsy.  Using standard technique and hibiclens prep of the cervix and vagina, pipelle aspiration of the endometrium was obtained.  The cervix was grasped anteriorly with a tenaculum.  The uterus sounded to 7 cm.  Tissue was obtained, placed in a container with formalin, labeled, and submitted to pathology.    The patient tolerated the procedure well.  Blood loss was less than one cc.  Standard post-biopsy precautions were given.    COUNSELING:      reports that she has never smoked. She has never used smokeless tobacco.    Body mass index is 32.44 kg/m .  Weight management plan: Diet and exercise  FRAX Risk Assessment    ASSESSMENT:  55 year old female with satisfactory annual exam  (Z12.4) Screening for malignant neoplasm of cervix  (primary encounter diagnosis)  Comment:   Plan: HPV High Risk Types DNA Cervical, Pap imaged         thin layer diagnostic with HPV (select HPV         order below)            (N85.01) Simple endometrial hyperplasia  Comment:   Plan: ENDOMETRIAL BIOPSY W/O CERVICAL DILATION,         Surgical pathology exam

## 2021-03-06 LAB
COPATH REPORT: NORMAL
PAP: NORMAL

## 2021-03-09 LAB — COPATH REPORT: NORMAL

## 2021-03-12 ENCOUNTER — PATIENT OUTREACH (OUTPATIENT)
Dept: OBGYN | Facility: CLINIC | Age: 56
End: 2021-03-12

## 2021-03-12 DIAGNOSIS — R87.810 CERVICAL HIGH RISK HPV (HUMAN PAPILLOMAVIRUS) TEST POSITIVE: ICD-10-CM

## 2021-03-23 DIAGNOSIS — N85.01 ENDOMETRIAL HYPERPLASIA, SIMPLE: ICD-10-CM

## 2021-03-23 NOTE — TELEPHONE ENCOUNTER
Pending Prescriptions:                       Disp   Refills    progesterone (PROMETRIUM) 100 MG capsule  180 ca*2            Sig: Take 2 capsules (200 mg) by mouth At Bedtime    Pharmacy sent refill request. Pt had AE on 3/3/21. Up to date. Refill sent to pharmacy.   Rachael Grove, RN-BSN

## 2021-03-31 ENCOUNTER — IMMUNIZATION (OUTPATIENT)
Dept: NURSING | Facility: CLINIC | Age: 56
End: 2021-03-31
Payer: COMMERCIAL

## 2021-03-31 PROCEDURE — 0001A PR COVID VAC PFIZER DIL RECON 30 MCG/0.3 ML IM: CPT

## 2021-03-31 PROCEDURE — 91300 PR COVID VAC PFIZER DIL RECON 30 MCG/0.3 ML IM: CPT

## 2021-04-02 DIAGNOSIS — E03.9 HYPOTHYROIDISM, UNSPECIFIED TYPE: ICD-10-CM

## 2021-04-02 DIAGNOSIS — I10 ESSENTIAL HYPERTENSION WITH GOAL BLOOD PRESSURE LESS THAN 140/90: ICD-10-CM

## 2021-04-07 RX ORDER — LEVOTHYROXINE SODIUM 100 UG/1
100 TABLET ORAL DAILY
Qty: 90 TABLET | Refills: 0 | Status: SHIPPED | OUTPATIENT
Start: 2021-04-07 | End: 2021-05-26

## 2021-04-07 RX ORDER — AMLODIPINE BESYLATE 5 MG/1
TABLET ORAL
Qty: 90 TABLET | Refills: 0 | Status: SHIPPED | OUTPATIENT
Start: 2021-04-07 | End: 2021-06-28

## 2021-04-07 NOTE — TELEPHONE ENCOUNTER
Medication is being filled for 1 time refill only due to:  Patient needs to be seen because it has been more than one year since last visit. Due for physical and labs. Please call and assist with an appointment.

## 2021-04-19 NOTE — PATIENT INSTRUCTIONS
"Schedule an in-person visit in 2 months to follow-up thyroid and hypertension.  We'll do labs at that visit.    Patient Education     Self-Care for Headaches  Most headaches aren't serious and can be relieved with self-care. But some headaches may be a sign of another health problem like eye trouble or high blood pressure. To find the best treatment, learn what kind of headaches you get. For tension headaches, self-care will usually help. To treat migraines, ask your healthcare provider for advice. It's also possible to get both tension and migraine headaches. Self-care involves relieving the pain and avoiding headache \"triggers\" if you can.     Ways to reduce pain and tension  Try these steps:    Apply a cold compress or ice pack to the pain site.    Drink fluids. If nausea makes it hard to drink, try sucking on ice.    Rest. Protect yourself from bright light and loud noises.    Calm your emotions by imagining a peaceful scene.    Massage tight neck, shoulder, and head muscles.    To relax muscles, soak in a hot bath or use a hot shower.  Use medicines  Aspirin or other over-the-counter (OTC) pain medicines such as ibuprofen and acetaminophen can relieve headache. Remember: Never give aspirin to anyone 18 years old or younger because of the risk of getting Reye syndrome. Use pain medicines only when needed. Certain prescription and OTC medicines can lead to rebound headaches if they are taken too often. Check with your healthcare provider or pharmacist about your medicines.   Track your headaches  Keeping a headache diary can help you and your healthcare provider identify what's causing your headaches:     Note when each headache happens.    Identify your activities and the foods you've eaten 6 to 8 hours before the headache began.    Look for any trends or \"triggers.\"    Bring the information to your next medical appointment.  Signs of tension headache  Any of the following can be signs:     Dull pain or feeling " "of pressure in a tight band around your head    Pain in your neck or shoulders    Headache without a definite beginning or end    Headache after an activity such as driving or working on a computer  Signs of migraine  Any of the following can be signs:     Throbbing pain on one or both sides of your head    Nausea or vomiting    Extreme sensitivity to light, sound, and smells    Bright spots, flashes, or other visual changes    Pain or nausea so severe that you can't continue your daily activities  When to call your healthcare provider   Call your healthcare provider if any of these occur:     A headache that lingers after a recent injury or bump to the head    A headache that lasts for more than 3 days    Frequent headaches, especially in the morning  Get medical care right away if you have:    A headache along with slurred speech, changes in your vision, or numbness or weakness in your arms or legs    Headaches with seizures    A fever with a stiff neck or pain when you bend your head toward your chest    A headache that you would call \"the worst headache you have ever had\" or a severe, persistent headache that gets worse or doesn't get better with treatment  [email or web address] last reviewed this educational content on 7/1/2020 2000-2021 [employer]. All rights reserved. This information is not intended as a substitute for professional medical care. Always follow your healthcare professional's instructions.           Patient Education     Sinus Headaches  To help prevent sinus headaches    Treat colds promptly to keep mucus from backing up.    Stay away from things that trigger sinus problems, such as pollens, dust, cigarette smoke, fumes, mold, pet dander, air pollution, and strong odors.    Take allergy medicines as directed by your healthcare provider.    To relieve the pain    Keep your sinuses open and free of mucus. Try over-the-counter sinus rinse products.    Use a nasal decongestant as directed to " reduce the inflammation.    Drink fluids to keep the mucus thinner. This helps it drain more easily. You can also use a humidifier.    Apply hot packs to the area around your sinuses. Use a hot water bottle.    See your healthcare provider if your sinus headache lasts more than 2 weeks. You may need medicine for a sinus infection or an exam to check for other headache conditions, such as migraines.    .com last reviewed this educational content on 9/1/2019 2000-2021 [employer]. All rights reserved. This information is not intended as a substitute for professional medical care. Always follow your healthcare professional's instructions.

## 2021-04-19 NOTE — PROGRESS NOTES
Joan is a 56 year old who is being evaluated via a billable video visit.      How would you like to obtain your AVS? Fabianahart  If the video visit is dropped, the invitation should be resent by: Text to cell phone: [phone]  Will anyone else be joining your video visit? No      Video Start Time: 10:57 AM    Assessment & Plan     NATALIE (generalized anxiety disorder)  Major depressive disorder, recurrent episode, in full remission (H)  stable/well controlled - Continue current medication regimen.     Daily headache  Discussed that she has no red flag symptoms.  Most likely a tension headache. See patient instructions for self-care suggestions.  Encouraged avoidance of daily analgesic use and discussed risk of rebound headache.    I'd like to see her in clinic for follow-up of her HTN and Hypothyroidism - with labs.  Last BP on record is elevated but that was during an appointment for a procedure.  She'll schedule that sometime in the next few weeks.    Return in about 3 weeks (around 5/12/2021) for follow up, with me (Dr. Aguila), in person.    Doreen Aguila MD  [facility]   Joan is a 56 year old who presents for the following health issues     HPI     Just got 2nd COVID vaccine this morning.    Anxiety Follow-Up    How are you doing with your anxiety since your last visit? No change or improved slightly.    Are you having other symptoms that might be associated with anxiety? No    Have you had a significant life event? No     Are you feeling depressed? No    Do you have any concerns with your use of alcohol or other drugs? No   Has used about #30 lorazepam over the past 10 weeks.      Social History     Tobacco Use     Smoking status: Never Smoker     Smokeless tobacco: Never Used   Substance Use Topics     Alcohol use: Not Currently     Comment: Less than 4 servings (drinks) per month     Drug use: No     NATALIE-7 SCORE 1/30/2020 9/3/2020 4/21/2021   Total  Score - - -   Total Score 3 (minimal anxiety) 5 (mild anxiety) -   Total Score 3 5 4     PHQ 10/21/2019 1/30/2020 9/3/2020   PHQ-9 Total Score 0 1 3   Q9: Thoughts of better off dead/self-harm past 2 weeks Not at all Not at all Not at all         Headaches  Onset/Duration: about a year ago  Description  Location: back of head, between ears.   Character: dull pain  Frequency:  Every night except for recently - has noticed improvement recently with sinus headaches with supplement for immune support (AHCC).   Duration:  Several hours until takes ibuprofen  Wake with headaches: YES- occasionally  Able to do daily activities when headache present: YES  Intensity:  mild  Progression of Symptoms:  improving  Accompanying signs and symptoms:  Stiff neck: no  Neck or upper back pain: no  Sinus or URI symptoms YES- sinus symptoms  Fever: no  Nausea or vomiting: no  Dizziness: no  Numbness/tingling: no  Weakness: no  Visual changes: none  History  Head trauma: no  Family history of migraines: YES- family history of sinus problems.  Daily pain medication use: YES- tries not to take ibuprofen every day  Previous tests for headaches: no  Neurologist evaluation: no  Precipitating or Alleviating factors (light/sound/sleep/caffeine): none  Therapies tried and outcome: Ibuprofen (Advil, Motrin)              Outcome - effective  Frequent/daily pain medication use: YES    Feels her headaches improved with the use of AHCC supplement.  Tries not to use analgesics frequently.  Tylenol doesn't seem helpful.  She tries to avoid NSAIDs.  She has just started using arnica.    Review of Systems   as above       Objective           Vitals:  No vitals were obtained today due to virtual visit.    Physical Exam   GENERAL: Healthy, alert and no distress  EYES: Eyes grossly normal to inspection.  No discharge or erythema, or obvious scleral/conjunctival abnormalities.  RESP: No audible wheeze, cough, or visible cyanosis.  No visible retractions or  increased work of breathing.    SKIN: Visible skin clear. No significant rash, abnormal pigmentation or lesions.  NEURO: Cranial nerves grossly intact.  Mentation and speech appropriate for age.  PSYCH: Mentation appears normal, affect normal/bright, judgement and insight intact, normal speech and appearance well-groomed.        Video-Visit Details    Type of service:  Video Visit    Video End Time:11:11 AM    Originating Location (pt. Location): Home    Distant Location (provider location):  [facility]     Platform used for Video Visit: eBooks in Motion

## 2021-04-21 ENCOUNTER — IMMUNIZATION (OUTPATIENT)
Dept: NURSING | Facility: CLINIC | Age: 56
End: 2021-04-21
Attending: INTERNAL MEDICINE
Payer: COMMERCIAL

## 2021-04-21 ENCOUNTER — VIRTUAL VISIT (OUTPATIENT)
Dept: FAMILY MEDICINE | Facility: CLINIC | Age: 56
End: 2021-04-21
Payer: COMMERCIAL

## 2021-04-21 DIAGNOSIS — F41.1 GAD (GENERALIZED ANXIETY DISORDER): Primary | ICD-10-CM

## 2021-04-21 DIAGNOSIS — R51.9 DAILY HEADACHE: ICD-10-CM

## 2021-04-21 DIAGNOSIS — F33.42 MAJOR DEPRESSIVE DISORDER, RECURRENT EPISODE, IN FULL REMISSION (H): ICD-10-CM

## 2021-04-21 PROCEDURE — 91300 PR COVID VAC PFIZER DIL RECON 30 MCG/0.3 ML IM: CPT

## 2021-04-21 PROCEDURE — 0002A PR COVID VAC PFIZER DIL RECON 30 MCG/0.3 ML IM: CPT

## 2021-04-21 PROCEDURE — 99213 OFFICE O/P EST LOW 20 MIN: CPT | Mod: GT | Performed by: FAMILY MEDICINE

## 2021-04-21 ASSESSMENT — PATIENT HEALTH QUESTIONNAIRE - PHQ9: 5. POOR APPETITE OR OVEREATING: SEVERAL DAYS

## 2021-04-21 ASSESSMENT — ANXIETY QUESTIONNAIRES
7. FEELING AFRAID AS IF SOMETHING AWFUL MIGHT HAPPEN: SEVERAL DAYS
2. NOT BEING ABLE TO STOP OR CONTROL WORRYING: SEVERAL DAYS
3. WORRYING TOO MUCH ABOUT DIFFERENT THINGS: NOT AT ALL
5. BEING SO RESTLESS THAT IT IS HARD TO SIT STILL: NOT AT ALL
GAD7 TOTAL SCORE: 4
6. BECOMING EASILY ANNOYED OR IRRITABLE: NOT AT ALL
1. FEELING NERVOUS, ANXIOUS, OR ON EDGE: SEVERAL DAYS
IF YOU CHECKED OFF ANY PROBLEMS ON THIS QUESTIONNAIRE, HOW DIFFICULT HAVE THESE PROBLEMS MADE IT FOR YOU TO DO YOUR WORK, TAKE CARE OF THINGS AT HOME, OR GET ALONG WITH OTHER PEOPLE: SOMEWHAT DIFFICULT

## 2021-04-22 ASSESSMENT — ANXIETY QUESTIONNAIRES: GAD7 TOTAL SCORE: 4

## 2021-05-23 NOTE — PATIENT INSTRUCTIONS
Did you know?   I do telehealth (video) visits exclusively on Wednesdays.  I still do in-person visits at [facility] ([phone]) on Mondays, Tuesdays and Thursdays.  You can schedule a video visit for follow-up appointments as well as future appointments for certain conditions.  Please see the below link.  [email or web address]    If you have not already done so,  I encourage you to sign up for [email or web address] ([email or web address]).  This will allow you to review your results, securely communicate with your provider care team, and schedule virtual visits as well.    To schedule any ordered imaging studies you can call [facility] at [phone].  If you are scheduling a DEXA (bone density) scan please do NOT schedule this at the [facility].  Please ask that it be done at [facility] in [place].  Other preferred DEXA locations include [place] (at the [facility] [place]), [place], or [place].      I recommend that patients 50 and over get the Shingrix vaccine at a pharmacy.  The pharmacist will let you know beforehand if there is a copay and can administer the vaccine.  The Shingrix vaccination is a 2-shot series.  The second dose is given 6 months after the first.  (It cannot be given sooner than 2 months after the first dose - at the earliest.)  You should be aware that patients are reporting feeling a bit under the weather after the injection, including fatigue, malaise, and low-grade fever that may last a couple days.  Rarely patients can get a mild, shingles-like rash.   But these symptoms are much better than the Shingles disease.      Preventive Health Recommendations  Female Ages 50 - 64    Yearly exam: See your health care provider every year in order to  o Review health changes.   o Discuss preventive care.    o Review your medicines if your doctor has prescribed any.      Get  a Pap test every three years (unless you have an abnormal result and your provider advises testing more often).    If you get Pap tests with HPV test, you only need to test every 5 years, unless you have an abnormal result.     You do not need a Pap test if your uterus was removed (hysterectomy) and you have not had cancer.    You should be tested each year for STDs (sexually transmitted diseases) if you're at risk.     Have a mammogram every 1 to 2 years.    Have a colonoscopy at age 50, or have a yearly FIT test (stool test). These exams screen for colon cancer.      Have a cholesterol test every 5 years, or more often if advised.    Have a diabetes test (fasting glucose) every three years. If you are at risk for diabetes, you should have this test more often.     If you are at risk for osteoporosis (brittle bone disease), think about having a bone density scan (DEXA).    Shots: Get a flu shot each year. Get a tetanus shot every 10 years.    Nutrition:     Eat at least 5 servings of fruits and vegetables each day.    Eat whole-grain bread, whole-wheat pasta and brown rice instead of white grains and rice.    Get adequate Calcium and Vitamin D.     Lifestyle    Exercise at least 150 minutes a week (30 minutes a day, 5 days a week). This will help you control your weight and prevent disease.    Limit alcohol to one drink per day.    No smoking.     Wear sunscreen to prevent skin cancer.     See your dentist every six months for an exam and cleaning.    See your eye doctor every 1 to 2 years.

## 2021-05-25 ENCOUNTER — OFFICE VISIT (OUTPATIENT)
Dept: FAMILY MEDICINE | Facility: CLINIC | Age: 56
End: 2021-05-25
Payer: COMMERCIAL

## 2021-05-25 VITALS
DIASTOLIC BLOOD PRESSURE: 86 MMHG | HEART RATE: 65 BPM | RESPIRATION RATE: 14 BRPM | WEIGHT: 202 LBS | OXYGEN SATURATION: 97 % | HEIGHT: 65 IN | TEMPERATURE: 96.4 F | SYSTOLIC BLOOD PRESSURE: 120 MMHG | BODY MASS INDEX: 33.66 KG/M2

## 2021-05-25 DIAGNOSIS — Z11.59 NEED FOR HEPATITIS C SCREENING TEST: ICD-10-CM

## 2021-05-25 DIAGNOSIS — E03.9 ACQUIRED HYPOTHYROIDISM: ICD-10-CM

## 2021-05-25 DIAGNOSIS — E03.9 HYPOTHYROIDISM, UNSPECIFIED TYPE: ICD-10-CM

## 2021-05-25 DIAGNOSIS — R51.9 NONINTRACTABLE HEADACHE, UNSPECIFIED CHRONICITY PATTERN, UNSPECIFIED HEADACHE TYPE: ICD-10-CM

## 2021-05-25 DIAGNOSIS — I10 ESSENTIAL HYPERTENSION WITH GOAL BLOOD PRESSURE LESS THAN 140/90: ICD-10-CM

## 2021-05-25 DIAGNOSIS — Z00.00 ROUTINE GENERAL MEDICAL EXAMINATION AT A HEALTH CARE FACILITY: Primary | ICD-10-CM

## 2021-05-25 LAB
ANION GAP SERPL CALCULATED.3IONS-SCNC: 10 MMOL/L (ref 3–14)
BUN SERPL-MCNC: 11 MG/DL (ref 7–30)
CALCIUM SERPL-MCNC: 8.8 MG/DL (ref 8.5–10.1)
CHLORIDE SERPL-SCNC: 105 MMOL/L (ref 94–109)
CO2 SERPL-SCNC: 25 MMOL/L (ref 20–32)
CREAT SERPL-MCNC: 0.69 MG/DL (ref 0.52–1.04)
GFR SERPL CREATININE-BSD FRML MDRD: >90 ML/MIN/{1.73_M2}
GLUCOSE SERPL-MCNC: 92 MG/DL (ref 70–99)
HCV AB SERPL QL IA: NONREACTIVE
POTASSIUM SERPL-SCNC: 3.6 MMOL/L (ref 3.4–5.3)
SODIUM SERPL-SCNC: 140 MMOL/L (ref 133–144)
TSH SERPL DL<=0.005 MIU/L-ACNC: 0.75 MU/L (ref 0.4–4)

## 2021-05-25 PROCEDURE — 84443 ASSAY THYROID STIM HORMONE: CPT | Performed by: FAMILY MEDICINE

## 2021-05-25 PROCEDURE — 80048 BASIC METABOLIC PNL TOTAL CA: CPT | Performed by: FAMILY MEDICINE

## 2021-05-25 PROCEDURE — 86803 HEPATITIS C AB TEST: CPT | Performed by: FAMILY MEDICINE

## 2021-05-25 PROCEDURE — 99396 PREV VISIT EST AGE 40-64: CPT | Performed by: FAMILY MEDICINE

## 2021-05-25 PROCEDURE — 99213 OFFICE O/P EST LOW 20 MIN: CPT | Mod: 25 | Performed by: FAMILY MEDICINE

## 2021-05-25 PROCEDURE — 36415 COLL VENOUS BLD VENIPUNCTURE: CPT | Performed by: FAMILY MEDICINE

## 2021-05-25 ASSESSMENT — ENCOUNTER SYMPTOMS
NERVOUS/ANXIOUS: 1
HEMATOCHEZIA: 0
HEADACHES: 1
DYSURIA: 0
SORE THROAT: 0
MYALGIAS: 0
JOINT SWELLING: 0
FREQUENCY: 0
BREAST MASS: 0
CHILLS: 0
PARESTHESIAS: 0
ARTHRALGIAS: 0
PALPITATIONS: 0
DIARRHEA: 0
NAUSEA: 0
ABDOMINAL PAIN: 0
DIZZINESS: 0
COUGH: 0
FEVER: 0
HEMATURIA: 0
HEARTBURN: 0
EYE PAIN: 0
SHORTNESS OF BREATH: 0
CONSTIPATION: 0
WEAKNESS: 0

## 2021-05-25 ASSESSMENT — MIFFLIN-ST. JEOR: SCORE: 1509.21

## 2021-05-25 NOTE — PROGRESS NOTES
SUBJECTIVE:   CC: Joan Lam is an 56 year old woman who presents for preventive health visit.       Patient has been advised of split billing requirements and indicates understanding: Yes  Healthy Habits:     Getting at least 3 servings of Calcium per day:  Yes    Bi-annual eye exam:  NO    Dental care twice a year:  Yes    Sleep apnea or symptoms of sleep apnea:  None    Diet:  Regular (no restrictions)    Frequency of exercise:  4-5 days/week    Duration of exercise:  15-30 minutes    Taking medications regularly:  Yes    Medication side effects:  None    PHQ-2 Total Score: 0    Additional concerns today:  No      Hypertension Follow-up    Do you check your blood pressure regularly outside of the clinic? Yes     Are you following a low salt diet? Yes    Are your blood pressures ever more than 140 on the top number (systolic) OR more   than 90 on the bottom number (diastolic), for example 140/90? Yes occasionally    Hypothyroidism Follow-up    Since last visit, patient describes the following symptoms: Weight stable, no hair loss, no skin changes, no constipation, no loose stools      Today's PHQ-2 Score:   PHQ-2 ( 1999 Pfizer) 5/25/2021   Q1: Little interest or pleasure in doing things 0   Q2: Feeling down, depressed or hopeless 0   PHQ-2 Score 0   Q1: Little interest or pleasure in doing things Not at all   Q2: Feeling down, depressed or hopeless Not at all   PHQ-2 Score 0       Abuse: Current or Past (Physical, Sexual or Emotional) - No  Do you feel safe in your environment? Yes    Have you ever done Advance Care Planning? (For example, a Health Directive, POLST, or a discussion with a medical provider or your loved ones about your wishes): No, advance care planning information given to patient to review.  Patient plans to discuss their wishes with loved ones or provider.      Social History     Tobacco Use     Smoking status: Never Smoker     Smokeless tobacco: Never Used   Substance Use Topics      Alcohol use: Not Currently     Comment: Less than 4 servings (drinks) per month     If you drink alcohol do you typically have >3 drinks per day or >7 drinks per week? No    Alcohol Use 5/25/2021   Prescreen: >3 drinks/day or >7 drinks/week? No   Prescreen: >3 drinks/day or >7 drinks/week? -       Reviewed orders with patient.  Reviewed health maintenance and updated orders accordingly - Yes  BP Readings from Last 3 Encounters:   05/25/21 120/86   03/03/21 (!) 150/91   03/11/20 (!) 142/93    Wt Readings from Last 3 Encounters:   05/25/21 78.5 kg (173 lb)   03/03/21 91.2 kg (201 lb)   03/11/20 91.6 kg (202 lb)             Breast Cancer Screening:    FSH-7:   Breast CA Risk Assessment (FHS-7) 5/25/2021   Did any of your first-degree relatives have breast or ovarian cancer? Yes   Did any of your relatives have bilateral breast cancer? Unknown   Did any man in your family have breast cancer? Unknown   Did any woman in your family have breast and ovarian cancer? Yes   Did any woman in your family have breast cancer before age 50 y? Unknown   Do you have 2 or more relatives with breast and/or ovarian cancer? Yes   Do you have 2 or more relatives with breast and/or bowel cancer? Yes       Mammogram Screening: Recommended annual mammography  Pertinent mammograms are reviewed under the imaging tab.    History of abnormal Pap smear: NO - age 30-65 PAP every 5 years with negative HPV co-testing recommended  PAP / HPV Latest Ref Rng & Units 3/3/2021 2/5/2020 1/23/2019   PAP - NIL NIL NIL   HPV 16 DNA NEG:Negative Negative Negative Negative   HPV 18 DNA NEG:Negative Negative Negative Negative   OTHER HR HPV NEG:Negative Positive(A) Positive(A) Positive(A)     Reviewed and updated as needed this visit by clinical staff  Tobacco  Allergies  Meds  Problems  Med Hx  Surg Hx  Fam Hx  Soc Hx          Reviewed and updated as needed this visit by Provider    Meds  Problems              Wt Readings from Last 5 Encounters:    21 78.5 kg (173 lb)   21 91.2 kg (201 lb)   20 91.6 kg (202 lb)   20 92.1 kg (203 lb)   20 92.1 kg (203 lb)      Past Medical History:   Diagnosis Date     Allergic rhinitis 2005     Anxiety disorder     Has Panic Attacks     Cervical high risk HPV (human papillomavirus) test positive 2019, 20, 03/3/21     Depressive disorder     and NATALIE. Taking Fluoxetine (40mg)     History of prolactinoma     Dr. Fischer, [facility]     Hypertension 2016    As of late , now taking Amlopidine     Hypothyroidism     Hypo-thyroid. Taking Levothyroxine      Past Surgical History:   Procedure Laterality Date     BIOPSY  mid-    from cervix after abnormal Pap. Benign.     BUNIONECTOMY RT/LT      Right Foot     BUNIONECTOMY RT/LT      Left Foot      COLONOSCOPY  January 15, 2018    1 polyp - benign. Next exam in 10 years.     OB History    Para Term  AB Living   0 0 0 0 0 0   SAB TAB Ectopic Multiple Live Births   0 0 0 0 0       Review of Systems   Constitutional: Negative for chills and fever.   HENT: Positive for congestion. Negative for ear pain, hearing loss and sore throat.    Eyes: Negative for pain and visual disturbance.   Respiratory: Negative for cough and shortness of breath.    Cardiovascular: Negative for chest pain, palpitations and peripheral edema.   Gastrointestinal: Negative for abdominal pain, constipation, diarrhea, heartburn, hematochezia and nausea.   Breasts:  Negative for tenderness, breast mass and discharge.   Genitourinary: Negative for dysuria, frequency, genital sores, hematuria, pelvic pain, urgency, vaginal bleeding and vaginal discharge.   Musculoskeletal: Negative for arthralgias, joint swelling and myalgias.   Skin: Negative for rash.   Neurological: Positive for headaches. Negative for dizziness, weakness and paresthesias.   Psychiatric/Behavioral: Negative for mood changes. The  "patient is nervous/anxious.         OBJECTIVE:   /86 (BP Location: Left arm, Patient Position: Chair, Cuff Size: Adult Large)   Pulse 65   Temp 96.4  F (35.8  C) (Tympanic)   Resp 14   Ht 1.638 m (5' 4.5\")   Wt 78.5 kg (173 lb)   LMP 02/21/2021 (LMP Unknown)   SpO2 97%   BMI 29.24 kg/m    Physical Exam  GENERAL APPEARANCE: healthy, alert and no distress  EYES: Eyes grossly normal to inspection, conjunctivae and sclerae normal  HENT: ear canals and TM's normal  NECK: no adenopathy, no asymmetry, masses, or scars and thyroid normal to palpation  RESP: lungs clear to auscultation - no rales, rhonchi or wheezes  CV: regular rate and rhythm, normal S1 S2, no S3 or S4, no murmur, click or rub, no peripheral edema   ABDOMEN: soft, nontender, no hepatosplenomegaly, no masses   MS: no musculoskeletal defects are noted and gait is age appropriate without ataxia  SKIN: no suspicious lesions or rashes  NEURO: Normal strength and tone, sensory exam grossly normal, mentation intact and speech normal  PSYCH: mentation appears normal and affect normal/bright         ASSESSMENT/PLAN:     Routine general medical examination at a health care facility      Essential hypertension with goal blood pressure less than 140/90  well controlled - Continue current medication regimen.   - Basic metabolic panel    Acquired hypothyroidism  Continue replacement therapy with levothyroxine.  Awaiting TSH results to determine if dose change is indicated.   - TSH with free T4 reflex    Nonintractable headache, unspecified chronicity pattern, unspecified headache type  Improved.  Previously this was a daily headache and now it is less often (approx every other day).  Patient instructed to contact clinic if symptoms persist, worsen, or do not resolve as anticipated.      Need for hepatitis C screening test  - Hepatitis C Screen Reflex to HCV RNA Quant and Genotype     COUNSELING:  Reviewed preventive health counseling, as reflected in " "patient instructions    Estimated body mass index is 29.24 kg/m  as calculated from the following:    Height as of this encounter: 1.638 m (5' 4.5\").    Weight as of this encounter: 78.5 kg (173 lb).  Weight management plan: Discussed healthy diet and exercise guidelines    She reports that she has never smoked. She has never used smokeless tobacco.      Counseling Resources:  ATP IV Guidelines  Pooled Cohorts Equation Calculator  Breast Cancer Risk Calculator  BRCA-Related Cancer Risk Assessment: FHS-7 Tool  FRAX Risk Assessment  ICSI Preventive Guidelines  Dietary Guidelines for Americans, 2010  USDA's MyPlate  ASA Prophylaxis  Lung CA Screening    Doreen Aguila MD  [facility]    "

## 2021-05-26 RX ORDER — LEVOTHYROXINE SODIUM 100 UG/1
100 TABLET ORAL DAILY
Qty: 90 TABLET | Refills: 3 | Status: SHIPPED | OUTPATIENT
Start: 2021-05-26 | End: 2021-06-28

## 2021-05-26 NOTE — RESULT ENCOUNTER NOTE
Twan Franco,  Your results all look good!  This includes Hepatitis C screening, TSH (thyroid function test), and basic metabolic panel results (blood salts, blood sugar, and kidney function).  I sent authorization to your pharmacy to fill the thyroid medication at the same dose for another year.      Doreen Aguila MD

## 2021-07-22 DIAGNOSIS — F41.1 GAD (GENERALIZED ANXIETY DISORDER): ICD-10-CM

## 2021-07-23 RX ORDER — LORAZEPAM 0.5 MG/1
TABLET ORAL
Qty: 60 TABLET | Refills: 0 | Status: SHIPPED | OUTPATIENT
Start: 2021-07-23 | End: 2021-12-22

## 2021-09-18 ENCOUNTER — HEALTH MAINTENANCE LETTER (OUTPATIENT)
Age: 56
End: 2021-09-18

## 2021-11-22 ENCOUNTER — IMMUNIZATION (OUTPATIENT)
Dept: NURSING | Facility: CLINIC | Age: 56
End: 2021-11-22
Payer: COMMERCIAL

## 2021-11-22 PROCEDURE — 91300 PR COVID VAC PFIZER DIL RECON 30 MCG/0.3 ML IM: CPT

## 2021-11-22 PROCEDURE — 0004A PR COVID VAC PFIZER DIL RECON 30 MCG/0.3 ML IM: CPT

## 2021-11-30 DIAGNOSIS — N85.01 ENDOMETRIAL HYPERPLASIA, SIMPLE: Primary | ICD-10-CM

## 2021-11-30 RX ORDER — PROGESTERONE 100 MG/1
200 CAPSULE ORAL DAILY
Qty: 180 CAPSULE | Refills: 0 | Status: SHIPPED | OUTPATIENT
Start: 2021-11-30 | End: 2022-02-21

## 2021-11-30 NOTE — TELEPHONE ENCOUNTER
Pharmacy sent refill request for prometrium.  Pt due for annual exam 3/2022.  90 day refill sent today.  Nilam Hawley RN

## 2021-12-21 NOTE — PATIENT INSTRUCTIONS
"You are due for a routine preventive visit (\"annual physical\") with pap smear and labs in early June.    I kindly request that you check your MyChart prior to all appointments with me and complete any assigned questionnaires ahead of time.      FYI:  I do virtual (video) visits exclusively on Wednesdays.  I still do in-person visits at [facility] ([phone]) on Mondays, Tuesdays and Thursdays.  You can schedule a video visit for many conditions.  Please follow this link:  [email or web address]    To schedule any ordered imaging studies (including mammogram) you can call [facility]uling at [phone].       "

## 2021-12-22 ENCOUNTER — VIRTUAL VISIT (OUTPATIENT)
Dept: FAMILY MEDICINE | Facility: CLINIC | Age: 56
End: 2021-12-22
Payer: COMMERCIAL

## 2021-12-22 DIAGNOSIS — F33.42 MAJOR DEPRESSIVE DISORDER, RECURRENT EPISODE, IN FULL REMISSION (H): ICD-10-CM

## 2021-12-22 DIAGNOSIS — F41.1 GAD (GENERALIZED ANXIETY DISORDER): ICD-10-CM

## 2021-12-22 DIAGNOSIS — D35.2 PROLACTINOMA (H): Primary | ICD-10-CM

## 2021-12-22 PROCEDURE — 99214 OFFICE O/P EST MOD 30 MIN: CPT | Mod: GT | Performed by: FAMILY MEDICINE

## 2021-12-22 RX ORDER — LORAZEPAM 0.5 MG/1
TABLET ORAL
Qty: 60 TABLET | Refills: 0 | Status: SHIPPED | OUTPATIENT
Start: 2021-12-22 | End: 2022-03-24

## 2021-12-22 ASSESSMENT — ANXIETY QUESTIONNAIRES
6. BECOMING EASILY ANNOYED OR IRRITABLE: SEVERAL DAYS
GAD7 TOTAL SCORE: 7
1. FEELING NERVOUS, ANXIOUS, OR ON EDGE: MORE THAN HALF THE DAYS
2. NOT BEING ABLE TO STOP OR CONTROL WORRYING: NOT AT ALL
4. TROUBLE RELAXING: SEVERAL DAYS
3. WORRYING TOO MUCH ABOUT DIFFERENT THINGS: SEVERAL DAYS
GAD7 TOTAL SCORE: 7
7. FEELING AFRAID AS IF SOMETHING AWFUL MIGHT HAPPEN: MORE THAN HALF THE DAYS
GAD7 TOTAL SCORE: 7
7. FEELING AFRAID AS IF SOMETHING AWFUL MIGHT HAPPEN: MORE THAN HALF THE DAYS
5. BEING SO RESTLESS THAT IT IS HARD TO SIT STILL: NOT AT ALL

## 2021-12-22 ASSESSMENT — PATIENT HEALTH QUESTIONNAIRE - PHQ9
SUM OF ALL RESPONSES TO PHQ QUESTIONS 1-9: 3
SUM OF ALL RESPONSES TO PHQ QUESTIONS 1-9: 3
10. IF YOU CHECKED OFF ANY PROBLEMS, HOW DIFFICULT HAVE THESE PROBLEMS MADE IT FOR YOU TO DO YOUR WORK, TAKE CARE OF THINGS AT HOME, OR GET ALONG WITH OTHER PEOPLE: SOMEWHAT DIFFICULT

## 2021-12-22 NOTE — PROGRESS NOTES
"Joan is a 56 year old who is being evaluated via a billable video visit.      How would you like to obtain your AVS? MyChart  If the video visit is dropped, the invitation should be resent by: Text to cell phone: [phone]  Will anyone else be joining your video visit? No    Video Start Time: 10:08 AM    Assessment & Plan     Major depressive disorder, recurrent episode, in full remission (H)  NATALIE (generalized anxiety disorder)  stable/adequately controlled - Continue current medication regimen.  I checked the [place] Prescription Monitoring Program website which showed no concerning activity.  I note that her last 6-month supply of lorazepam has lasted 5 months and will continue to monitor.    - LORazepam (ATIVAN) 0.5 MG tablet  Dispense: 60 tablet; Refill: 0  - FLUoxetine (PROZAC) 20 MG capsule  Dispense: 180 capsule; Refill: 1    Prolactinoma (H)  I do recommend that she check in again with Endo.    - Adult Endocrinology Referral     Patient Instructions   You are due for a routine preventive visit (\"annual physical\") with pap smear and labs in early June.    I kindly request that you check your MyChart prior to all appointments with me and complete any assigned questionnaires ahead of time.      FYI:  I do virtual (video) visits exclusively on Wednesdays.  I still do in-person visits at [facility] ([phone]) on Mondays, Tuesdays and Thursdays.  You can schedule a video visit for many conditions.  Please follow this link:  [email or web address]    To schedule any ordered imaging studies (including mammogram) you can call [facility] Scheduling at [phone].       No follow-ups on file.    Doreen Aguila MD  [facility]          Subjective   Joan is a 56 year old who presents for the following health issues     History of Present Illness       Mental Health Follow-up:  Patient presents to follow-up on " "Anxiety.    Patient's anxiety since last visit has been:  Good  The patient is not having other symptoms associated with anxiety.  Any significant life events: health concerns  Patient is not feeling anxious or having panic attacks.  Patient has no concerns about alcohol or drug use.     Social History  Tobacco Use    Smoking status: Never Smoker    Smokeless tobacco: Never Used  Alcohol use: Not Currently    Comment: Less than 4 servings (drinks) per month  Drug use: No      Today's PHQ-9         PHQ-9 Total Score:     (P) 3   PHQ-9 Q9 Thoughts of better off dead/self-harm past 2 weeks :   (P) Not at all   Thoughts of suicide or self harm:      Self-harm Plan:        Self-harm Action:          Safety concerns for self or others:            She denies panic attacks but does use lorazepam when anxiety builds.  She gets #60 approximately every 6 months.  She currently has 3 tablets left from her 7/23/2021 supply.     PHQ 1/30/2020 9/3/2020 12/22/2021   PHQ-9 Total Score 1 3 3   Q9: Thoughts of better off dead/self-harm past 2 weeks Not at all Not at all Not at all     NATALIE-7 SCORE 9/3/2020 4/21/2021 12/22/2021   Total Score - - -   Total Score 5 (mild anxiety) - 7 (mild anxiety)   Total Score 5 4 7     Answers for HPI/ROS submitted by the patient on 12/22/2021  If you checked off any problems, how difficult have these problems made it for you to do your work, take care of things at home, or get along with other people?: Somewhat difficult  PHQ9 TOTAL SCORE: 3  NATALIE 7 TOTAL SCORE: 7    Pituitary Adenoma Follow-Up   She has a remote history of prolactinoma  This was most recently followed by Dr. Townsend at [facility].  She was last seen by [facility] in 2018 with plan for MRI and follow-up in 7 months but she never completed that.        Review of Systems         Objective    Vitals - Patient Reported  Weight (Patient Reported): 90.7 kg (200 lb)  Height (Patient Reported): 167.6 cm (5' 6\")  BMI (Based on Pt Reported Ht/Wt): " 32.28      Vitals:  No vitals were obtained today due to virtual visit.    Physical Exam   GENERAL: Healthy, alert and no distress  EYES: Eyes grossly normal to inspection.  No discharge or erythema, or obvious scleral/conjunctival abnormalities.  RESP: No audible wheeze, cough, or visible cyanosis.  No visible retractions or increased work of breathing.    SKIN: Visible skin clear. No significant rash, abnormal pigmentation or lesions.  NEURO: Cranial nerves grossly intact.  Mentation and speech appropriate for age.  PSYCH: Mentation appears normal, affect normal/bright, judgement and insight intact, normal speech and appearance well-groomed.          Video-Visit Details    Type of service:  Video Visit    Video End Time:10:18 AM    Originating Location (pt. Location): Home    Distant Location (provider location):  [facility]     Platform used for Video Visit: Tactile

## 2021-12-23 ASSESSMENT — PATIENT HEALTH QUESTIONNAIRE - PHQ9: SUM OF ALL RESPONSES TO PHQ QUESTIONS 1-9: 3

## 2021-12-23 ASSESSMENT — ANXIETY QUESTIONNAIRES: GAD7 TOTAL SCORE: 7

## 2022-01-21 ENCOUNTER — VIRTUAL VISIT (OUTPATIENT)
Dept: ENDOCRINOLOGY | Facility: CLINIC | Age: 57
End: 2022-01-21
Payer: COMMERCIAL

## 2022-01-21 DIAGNOSIS — D35.2 PROLACTINOMA (H): Primary | ICD-10-CM

## 2022-01-21 DIAGNOSIS — E03.9 HYPOTHYROIDISM, UNSPECIFIED TYPE: ICD-10-CM

## 2022-01-21 PROCEDURE — 99214 OFFICE O/P EST MOD 30 MIN: CPT | Mod: GT | Performed by: INTERNAL MEDICINE

## 2022-01-21 ASSESSMENT — ENCOUNTER SYMPTOMS
TASTE DISTURBANCE: 0
SINUS CONGESTION: 1
SORE THROAT: 0
HOARSE VOICE: 0
SINUS PAIN: 1
NECK MASS: 0
SMELL DISTURBANCE: 0
TROUBLE SWALLOWING: 0

## 2022-01-21 NOTE — PATIENT INSTRUCTIONS
"We have ordered a prolactin level. Please schedule an appointment to have this collected. We will be in touch regarding the results in [place].     For scheduling appointments or labs, please request an appointment through [employer] or call [phone] select option #3 for triage nurse    For questions for your provider or the endocrine nurse, please send a [employer] message or call [phone] select option #3 for triage nurse    If this is an emergency overnight or on weekends, please call [phone]. This will get you the [employer] . Please ask for adult endocrinology \"on call\" and they will connect you to one of my colleagues who will always be available.            "

## 2022-01-21 NOTE — PROGRESS NOTES
Video visit  Start: 9:00 am  End: 9:27 am  [facility]    Endocrinology follow up visit      Chief complaint:  Joan is a 56 year old female seen in consultation for follow up of prolactinoma.     INTERVAL HISTORY  Last seen in 02/2020. Has done well in the interim, denies any new symptoms. Is remodeling her house, does have sinus congestion related headaches at night at times due to dust from vents.   Brain MRI had been ordered, not completed due to felling very well.   Denies any galactorrhea, breast tenderness or other new symptoms.   TSH (1.0), FT4 (1.08) were within normal limits in 2020. In 05/2021 TSH was 0.75. Continues on  levothyroxine 100 mcg daily. Clinically euthyroid.   Prolactin level 67 (04/2018) >> 44 (02/2020).     HISTORY OF PRESENT ILLNESS  Joan Lam is a 56 year old female who is here for follow-up on her prolactinoma. PMHx also includes NATALIE, depression, HTN, simple endometrial hyperplasia, and hypothyroidism     She has remote history of a micro-prolactinoma diagnosed at age 40 (2005), at which time she had galactorrhea and amenorrhea. Her prolactin level was in the 60s-70s range, and a brain MRI showed microadenoma per her report. She did not have any medical treatment at the time, and prolactin level spontaneously decreased. She stopped seeing her endocrinologist at the time.    MRI Report from 2008 reveals two small microadenomas ~4mm in size at anterosuperior and inferior aspects of pituitary (OSH scan).     She did continue to follow with her PCP who was tracking prolactin level yearly. In 2015 prolactin was 42, then in November 2017 increased to 96.1, along with breast tenderness (no galactorrhea).      She came to our clinic for evaluation on 4/10/2018, at which time she reported breast tenderness. Prolactin level was elevated to 67 at that time. She was prescribed cabergoline 0.25 mg once a week and brain MRI was ordered, but due to job and insurance changes she did not end up  getting the MRI or cabergoline.      She also has been taking progesterone -300 mg daily for hot flashes, which she started in 2010. However she wasn't taking it as regularly as she was prescribed because she was in between jobs/insurance. She notes that she hadn't had her period [id number], then in 2018 started having symptoms (cramps), and then started having vaginal bleeding again. Progesterone sporadically due to her insurance issues. An endometrial biopsy and ultrasound was done in 2018, and she was diagnosed with simple endometrial hyperplasia. She is still taking progesterone regularly 200 mg daily. Now she has regular periods.    REVIEW OF SYSTEMS    10 system ROS otherwise as per the HPI or negative    Past Medical History  Past Medical History:   Diagnosis Date     Allergic rhinitis 08/11/2005     Anxiety disorder     Has Panic Attacks     Cervical high risk HPV (human papillomavirus) test positive 01/23/2019 01/23/2019, 02/05/20, 03/3/21     Depressive disorder 1992    and NATALIE. Taking Fluoxetine (40mg)     History of prolactinoma 2005    Dr. Fischer, [facility]     Hypertension 12/2016     Hypothyroidism 2014       Medications  Current Outpatient Medications   Medication     amLODIPine (NORVASC) 5 MG tablet     cetirizine (ZYRTEC) 10 MG tablet     cholecalciferol (VITAMIN D3) 1000 UNIT tablet     desonide (DESOWEN) 0.05 % external ointment     FLUoxetine (PROZAC) 20 MG capsule     fluticasone (FLONASE) 50 MCG/ACT nasal spray     levothyroxine (SYNTHROID/LEVOTHROID) 100 MCG tablet     LORazepam (ATIVAN) 0.5 MG tablet     progesterone (PROMETRIUM) 100 MG capsule     VALERIAN ROOT PO     No current facility-administered medications for this visit.       Current Outpatient Medications   Medication Sig Dispense Refill     amLODIPine (NORVASC) 5 MG tablet TAKE 1 TABLET BY MOUTH  DAILY. 90 tablet 3     cetirizine (ZYRTEC) 10 MG tablet Take 10 mg by mouth daily       cholecalciferol (VITAMIN D3)  1000 UNIT tablet Take 3 tablets (3,000 Units) by mouth daily       desonide (DESOWEN) 0.05 % external ointment Apply topically 2 times daily as needed (to affected area) up to one week at a time. 15 g 1     FLUoxetine (PROZAC) 20 MG capsule TAKE 2 CAPSULES BY MOUTH  DAILY 180 capsule 1     fluticasone (FLONASE) 50 MCG/ACT nasal spray Spray 2 sprays into both nostrils daily       levothyroxine (SYNTHROID/LEVOTHROID) 100 MCG tablet TAKE 1 TABLET BY MOUTH  DAILY 90 tablet 3     LORazepam (ATIVAN) 0.5 MG tablet TAKE 1 TABLET(0.5 MG) BY MOUTH DAILY AS NEEDED FOR ANXIETY 60 tablet 0     progesterone (PROMETRIUM) 100 MG capsule Take 2 capsules (200 mg) by mouth daily 180 capsule 0     VALERIAN ROOT PO Take 1,000 mg by mouth         Allergies  Allergies   Allergen Reactions     No Known Drug Allergies          Family History  family history includes Anxiety Disorder in her paternal grandmother; Breast Cancer in her maternal grandmother, mother, and another family member; Cerebrovascular Disease in her maternal grandmother; Colon Cancer in her maternal grandmother; Depression in her paternal grandfather; Hypertension in her father; Substance Abuse in her brother and maternal grandfather.    Social History    Social History     Tobacco Use     Smoking status: Never Smoker     Smokeless tobacco: Never Used   Substance Use Topics     Alcohol use: Not Currently     Comment: Less than 4 servings (drinks) per month     Drug use: No       Physical Exam  GENERAL :  In no apparent distress  SKIN: visible skin clear, no rashes  EYES: No scleral icterus,  No proptosis, conjunctival redness, stare, retraction  NECK: No visible masses.  RESP: No audible cough or increased work of breathing.  NEURO: awake, alert, responds appropriately to questions.     DATA REVIEW  ENDO THYROID LABS-P Latest Ref Rng & Units 5/25/2021 2/5/2020   TSH 0.40 - 4.00 mU/L 0.75 1.00   FREE T4 0.76 - 1.46 ng/dL  1.08     ENDO PITUITARY LABS-P Latest Ref Rng &  Units 5/25/2021 2/5/2020   GLUCOSE 70 - 99 mg/dL 92    INS GROWTH FACTOR 1 55 - 235 ng/ml     POTASSIUM 3.4 - 5.3 mmol/L 3.6    PROLACTIN 3 - 27 ug/L  44 (H)   SODIUM 133 - 144 mmol/L 140    TSH 0.40 - 4.00 mU/L 0.75 1.00   T4 FREE 0.76 - 1.46 ng/dL  1.08     ENDO PITUITARY LABS-[facility] Latest Ref Rng & Units 10/21/2019 6/7/2019   GLUCOSE 70 - 99 mg/dL 98 87   INS GROWTH FACTOR 1 55 - 235 ng/ml     POTASSIUM 3.4 - 5.3 mmol/L 4.0 4.0   PROLACTIN 3 - 27 ug/L     SODIUM 133 - 144 mmol/L 138 141   TSH 0.40 - 4.00 mU/L 1.15 0.98   T4 FREE 0.76 - 1.46 ng/dL       ENDO PITUITARY LABS-[facility] Latest Ref Rng & Units 4/10/2018   GLUCOSE 70 - 99 mg/dL 89   INS GROWTH FACTOR 1 55 - 235 ng/ml 102   POTASSIUM 3.4 - 5.3 mmol/L 3.4   PROLACTIN 3 - 27 ug/L 67 (H)   SODIUM 133 - 144 mmol/L 138   TSH 0.40 - 4.00 mU/L 0.97   T4 FREE 0.76 - 1.46 ng/dL 1.10       ASSESSMENT/PLAN:     Prolactinoma  Diagnosed at age 40 - has been monitored over time but never received medical treatment. In April 2018, prolactin level was elevated to 67; decreased to 44 in 2020 (last seen in 2020 in this clinic). She was clinically asymptomatic, therefore had opted to forego the Brain MRI which is reasonable.  She is postmenopausal; expect prolactin level to further decline naturally; no indication for treatment. We will reassess a prolactin level, if this is increasing can proceed with an MRI, otherwise can forego this and follow up in one year.   - prolactin level   - RTC in one year.     Hypothyroidism- remains on levothyroxine 100 mcg daily chronically. Clinically euthyroid. Last TSH normal, no indication for change in dosage.     Gracia Shin MD  Endocrinology Fellow, PGY IV    Attending tie-in note  I saw the patient with endocrine fellow Dr. Shin and directly examined patient and discussed. Agree above note and plan.     30  minutes spent on the date of the encounter doing chart review, history and exam, documentation and further activities as noted  above.    Shaunna Townsend MD  Staff Physician  Endocrinology and Metabolism  [facility]  License: [place] [id number]  Pager: [phone]

## 2022-01-21 NOTE — LETTER
1/21/2022         RE: Joan Lam  [address] [facility] [address]        Dear Colleague,    Thank you for referring your patient, Joan Lam, to the [facility]. Please see a copy of my visit note below.    Video visit  Start: 9:00 am  End: 9:27 am  [facility]    End[facility]inology follow up visit      Chief complaint:  Joan is a 56 year old female seen in consultation for follow up of prolactinoma.     INTERVAL HISTORY  Last seen in 02/2020. Has done well in the interim, denies any new symptoms. Is remodeling her house, does have sinus congestion related headaches at night at times due to dust from vents.   Brain MRI had been ordered, not completed due to felling very well.   Denies any galactorrhea, breast tenderness or other new symptoms.   TSH (1.0), FT4 (1.08) were within normal limits in 2020. In 05/2021 TSH was 0.75. Continues on  levothyroxine 100 mcg daily. Clinically euthyroid.   Prolactin level 67 (04/2018) >> 44 (02/2020).     HISTORY OF PRESENT ILLNESS  Joan Lam is a 56 year old female who is here for follow-up on her prolactinoma. PMHx also includes NATALIE, depression, HTN, simple endometrial hyperplasia, and hypothyroidism     She has remote history of a micro-prolactinoma diagnosed at age 40 (2005), at which time she had galactorrhea and amenorrhea. Her prolactin level was in the 60s-70s range, and a brain MRI showed microadenoma per her report. She did not have any medical treatment at the time, and prolactin level spontaneously decreased. She stopped seeing her endocrinologist at the time.    MRI Report from 2008 reveals two small microadenomas ~4mm in size at anterosuperior and inferior aspects of pituitary (OSH scan).     She did continue to follow with her PCP who was tracking prolactin level yearly. In 2015 prolactin was 42, then in November 2017 increased to 96.1, along with breast tenderness (no galactorrhea).      She came to our clinic  for evaluation on 4/10/2018, at which time she reported breast tenderness. Prolactin level was elevated to 67 at that time. She was prescribed cabergoline 0.25 mg once a week and brain MRI was ordered, but due to job and insurance changes she did not end up getting the MRI or cabergoline.      She also has been taking progesterone -300 mg daily for hot flashes, which she started in 2010. However she wasn't taking it as regularly as she was prescribed because she was in between jobs/insurance. She notes that she hadn't had her period [id number], then in 2018 started having symptoms (cramps), and then started having vaginal bleeding again. Progesterone sporadically due to her insurance issues. An endometrial biopsy and ultrasound was done in 2018, and she was diagnosed with simple endometrial hyperplasia. She is still taking progesterone regularly 200 mg daily. Now she has regular periods.    REVIEW OF SYSTEMS    10 system ROS otherwise as per the HPI or negative    Past Medical History  Past Medical History:   Diagnosis Date     Allergic rhinitis 08/11/2005     Anxiety disorder     Has Panic Attacks     Cervical high risk HPV (human papillomavirus) test positive 01/23/2019 01/23/2019, 02/05/20, 03/3/21     Depressive disorder 1992    and NATALIE. Taking Fluoxetine (40mg)     History of prolactinoma 2005    Dr. Fischer, [facility]""     Hypertension 12/2016     Hypothyroidism 2014       Medications  Current Outpatient Medications   Medication     amLODIPine (NORVASC) 5 MG tablet     cetirizine (ZYRTEC) 10 MG tablet     cholecalciferol (VITAMIN D3) 1000 UNIT tablet     desonide (DESOWEN) 0.05 % external ointment     FLUoxetine (PROZAC) 20 MG capsule     fluticasone (FLONASE) 50 MCG/ACT nasal spray     levothyroxine (SYNTHROID/LEVOTHROID) 100 MCG tablet     LORazepam (ATIVAN) 0.5 MG tablet     progesterone (PROMETRIUM) 100 MG capsule     VALERIAN ROOT PO     No current facility-administered medications for  this visit.       Current Outpatient Medications   Medication Sig Dispense Refill     amLODIPine (NORVASC) 5 MG tablet TAKE 1 TABLET BY MOUTH  DAILY. 90 tablet 3     cetirizine (ZYRTEC) 10 MG tablet Take 10 mg by mouth daily       cholecalciferol (VITAMIN D3) 1000 UNIT tablet Take 3 tablets (3,000 Units) by mouth daily       desonide (DESOWEN) 0.05 % external ointment Apply topically 2 times daily as needed (to affected area) up to one week at a time. 15 g 1     FLUoxetine (PROZAC) 20 MG capsule TAKE 2 CAPSULES BY MOUTH  DAILY 180 capsule 1     fluticasone (FLONASE) 50 MCG/ACT nasal spray Spray 2 sprays into both nostrils daily       levothyroxine (SYNTHROID/LEVOTHROID) 100 MCG tablet TAKE 1 TABLET BY MOUTH  DAILY 90 tablet 3     LORazepam (ATIVAN) 0.5 MG tablet TAKE 1 TABLET(0.5 MG) BY MOUTH DAILY AS NEEDED FOR ANXIETY 60 tablet 0     progesterone (PROMETRIUM) 100 MG capsule Take 2 capsules (200 mg) by mouth daily 180 capsule 0     VALERIAN ROOT PO Take 1,000 mg by mouth         Allergies  Allergies   Allergen Reactions     No Known Drug Allergies          Family History  family history includes Anxiety Disorder in her paternal grandmother; Breast Cancer in her maternal grandmother, mother, and another family member; Cerebrovascular Disease in her maternal grandmother; Colon Cancer in her maternal grandmother; Depression in her paternal grandfather; Hypertension in her father; Substance Abuse in her brother and maternal grandfather.    Social History    Social History     Tobacco Use     Smoking status: Never Smoker     Smokeless tobacco: Never Used   Substance Use Topics     Alcohol use: Not Currently     Comment: Less than 4 servings (drinks) per month     Drug use: No       Physical Exam  GENERAL :  In no apparent distress  SKIN: visible skin clear, no rashes  EYES: No scleral icterus,  No proptosis, conjunctival redness, stare, retraction  NECK: No visible masses.  RESP: No audible cough or increased work of  breathing.  NEURO: awake, alert, responds appropriately to questions.     DATA REVIEW  ENDO THYROID LABS-[facility] Latest Ref Rng & Units 5/25/2021 2/5/2020   TSH 0.40 - 4.00 mU/L 0.75 1.00   FREE T4 0.76 - 1.46 ng/dL  1.08     ENDO PITUITARY LABS-[facility] Latest Ref Rng & Units 5/25/2021 2/5/2020   GLUCOSE 70 - 99 mg/dL 92    INS GROWTH FACTOR 1 55 - 235 ng/ml     POTASSIUM 3.4 - 5.3 mmol/L 3.6    PROLACTIN 3 - 27 ug/L  44 (H)   SODIUM 133 - 144 mmol/L 140    TSH 0.40 - 4.00 mU/L 0.75 1.00   T4 FREE 0.76 - 1.46 ng/dL  1.08     ENDO PITUITARY LABS-[facility] Latest Ref Rng & Units 10/21/2019 6/7/2019   GLUCOSE 70 - 99 mg/dL 98 87   INS GROWTH FACTOR 1 55 - 235 ng/ml     POTASSIUM 3.4 - 5.3 mmol/L 4.0 4.0   PROLACTIN 3 - 27 ug/L     SODIUM 133 - 144 mmol/L 138 141   TSH 0.40 - 4.00 mU/L 1.15 0.98   T4 FREE 0.76 - 1.46 ng/dL       ENDO PITUITARY LABS-[facility] Latest Ref Rng & Units 4/10/2018   GLUCOSE 70 - 99 mg/dL 89   INS GROWTH FACTOR 1 55 - 235 ng/ml 102   POTASSIUM 3.4 - 5.3 mmol/L 3.4   PROLACTIN 3 - 27 ug/L 67 (H)   SODIUM 133 - 144 mmol/L 138   TSH 0.40 - 4.00 mU/L 0.97   T4 FREE 0.76 - 1.46 ng/dL 1.10       ASSESSMENT/PLAN:     Prolactinoma  Diagnosed at age 40 - has been monitored over time but never received medical treatment. In April 2018, prolactin level was elevated to 67; decreased to 44 in 2020 (last seen in 2020 in this clinic). She was clinically asymptomatic, therefore had opted to forego the Brain MRI which is reasonable.  She is postmenopausal; expect prolactin level to further decline naturally; no indication for treatment. We will reassess a prolactin level, if this is increasing can proceed with an MRI, otherwise can forego this and follow up in one year.   - prolactin level   - RTC in one year.     Hypothyroidism- remains on levothyroxine 100 mcg daily chronically. Clinically euthyroid. Last TSH normal, no indication for change in dosage.     Gracia Shin MD  Endocrinology Fellow, PGY IV    Attending tie-in note  I  saw the patient with endocrine fellow Dr. Shin and directly examined patient and discussed. Agree above note and plan.     30  minutes spent on the date of the encounter doing chart review, history and exam, documentation and further activities as noted above.    Shaunna Townsend MD  Staff Physician  Endocrinology and Metabolism  [facility]  License: [place] [id number]  Pager: [phone]      Joan is a 56 year old who is being evaluated via a billable video visit.      How would you like to obtain your AVS? MyChart  If the video visit is dropped, the invitation should be resent by: Send to e-mail at: [email or web address]  Will anyone else be joining your video visit? No        Jessica MOON MA   [facility]   [facility]        Again, thank you for allowing me to participate in the care of your patient.        Sincerely,        Shaunna Townsend MD

## 2022-01-21 NOTE — PROGRESS NOTES
Joan is a 56 year old who is being evaluated via a billable video visit.      How would you like to obtain your AVS? MyChart  If the video visit is dropped, the invitation should be resent by: Send to e-mail at: [email or web address]  Will anyone else be joining your video visit? No        Jessica MOON MA   [facility]

## 2022-02-17 ENCOUNTER — PATIENT OUTREACH (OUTPATIENT)
Dept: FAMILY MEDICINE | Facility: CLINIC | Age: 57
End: 2022-02-17
Payer: COMMERCIAL

## 2022-02-17 NOTE — LETTER
February 17, 2022      Joan Lam  [address] [address]        Dear ,    This letter is to remind you that you are due for your follow-up Pap smear and Human Papillomavirus (HPV) test.    Please call [phone] to schedule your appointment at your earliest convenience.    If you have completed the appointment outside of the [facility] system, please have the records forwarded to our office. We will update your chart for your provider to review before your next annual wellness visit.     Thank you for choosing [facility]!      Sincerely,    Your [facility] Care Team

## 2022-02-18 ENCOUNTER — LAB (OUTPATIENT)
Dept: LAB | Facility: CLINIC | Age: 57
End: 2022-02-18
Payer: COMMERCIAL

## 2022-02-18 DIAGNOSIS — D35.2 PROLACTINOMA (H): ICD-10-CM

## 2022-02-18 DIAGNOSIS — N85.01 ENDOMETRIAL HYPERPLASIA, SIMPLE: ICD-10-CM

## 2022-02-18 LAB — PROLACTIN SERPL-MCNC: 32 UG/L (ref 3–27)

## 2022-02-18 PROCEDURE — 36415 COLL VENOUS BLD VENIPUNCTURE: CPT

## 2022-02-18 PROCEDURE — 84146 ASSAY OF PROLACTIN: CPT

## 2022-02-18 NOTE — TELEPHONE ENCOUNTER
Reason for Call:  Medication or medication refill:    Do you use a [facility]?  Name of the pharmacy and phone number for the current request:  [facility] - [place], [place] - [address], [address]    Name of the medication requested: progesterone (PROMETRIUM) 100 MG capsule    Other request: Patient is requesting a refill on this medication. Please assist. Thanks!    Can we leave a detailed message on this number? YES    Phone number patient can be reached at: Cell number on file:    Telephone Information:   Mobile [phone]     Best Time: Any    Call taken on 2/18/2022 at 12:10 PM by Felisha Padilla

## 2022-02-21 RX ORDER — PROGESTERONE 100 MG/1
200 CAPSULE ORAL DAILY
Qty: 180 CAPSULE | Refills: 3 | Status: SHIPPED | OUTPATIENT
Start: 2022-02-21 | End: 2022-04-21

## 2022-02-21 NOTE — TELEPHONE ENCOUNTER
Are you taking over filling this medication for her?     Last filled: by Dr. Low  progesterone (PROMETRIUM) 100 MG capsule 180 capsule 0 11/30/2021     Last visit:12/22/2021    Upcoming visit: 4/21/2022 with gyn    Thank you

## 2022-02-22 NOTE — TELEPHONE ENCOUNTER
Detailed message left on patient's voicemail relaying comments per Dr. Aguila.    TIFFANY AshbyN, RN  [facility]

## 2022-03-18 ENCOUNTER — HOSPITAL ENCOUNTER (OUTPATIENT)
Dept: MAMMOGRAPHY | Facility: CLINIC | Age: 57
Discharge: HOME | End: 2022-03-18
Attending: FAMILY MEDICINE | Admitting: FAMILY MEDICINE
Payer: COMMERCIAL

## 2022-03-18 DIAGNOSIS — Z12.31 VISIT FOR SCREENING MAMMOGRAM: ICD-10-CM

## 2022-03-18 PROCEDURE — 77067 SCR MAMMO BI INCL CAD: CPT

## 2022-03-20 NOTE — RESULT ENCOUNTER NOTE
Twan Franco,  I am happy to see that you completed your mammogram and that your result is normal!  Doreen Aguila MD

## 2022-03-22 ENCOUNTER — MYC MEDICAL ADVICE (OUTPATIENT)
Dept: FAMILY MEDICINE | Facility: CLINIC | Age: 57
End: 2022-03-22
Payer: COMMERCIAL

## 2022-03-22 DIAGNOSIS — F41.1 GAD (GENERALIZED ANXIETY DISORDER): ICD-10-CM

## 2022-03-23 RX ORDER — LORAZEPAM 0.5 MG/1
TABLET ORAL
Qty: 60 TABLET | Refills: 0 | Status: CANCELLED | OUTPATIENT
Start: 2022-03-23

## 2022-03-23 NOTE — TELEPHONE ENCOUNTER
Writer responded via [employer]. Routing to refill pool.    Debbie Arana RN  [facility]

## 2022-03-23 NOTE — TELEPHONE ENCOUNTER
"Yes, evisit is fine.  Please instruct her to select \"depression/anxiety\" as the reason for her evisit.  Doreen Aguila MD  [facility]   "

## 2022-03-23 NOTE — TELEPHONE ENCOUNTER
Dr. Aguila-Please advise if patient may submit an eVisit to address Lorazepam refill request?    Routing refill request to provider for review/approval because:  Drug not on the [facility] refill protocol     Last Written Prescription Date:  12/22/21  Last Fill Quantity: 60,  # refills: 0   Last office visit: 12/22/2021 with prescribing provider:  Dr. Aguila   Future Office Visit:  None    Thank you!  TIFFANY AshbyN, RN  [facility]

## 2022-03-24 ENCOUNTER — E-VISIT (OUTPATIENT)
Dept: FAMILY MEDICINE | Facility: CLINIC | Age: 57
End: 2022-03-24
Payer: COMMERCIAL

## 2022-03-24 DIAGNOSIS — F41.1 GAD (GENERALIZED ANXIETY DISORDER): ICD-10-CM

## 2022-03-24 PROCEDURE — 99421 OL DIG E/M SVC 5-10 MIN: CPT | Performed by: FAMILY MEDICINE

## 2022-03-24 RX ORDER — LORAZEPAM 0.5 MG/1
TABLET ORAL
Qty: 60 TABLET | Refills: 0 | Status: SHIPPED | OUTPATIENT
Start: 2022-03-24 | End: 2022-09-13

## 2022-03-24 ASSESSMENT — ANXIETY QUESTIONNAIRES
GAD7 TOTAL SCORE: 5
1. FEELING NERVOUS, ANXIOUS, OR ON EDGE: SEVERAL DAYS
7. FEELING AFRAID AS IF SOMETHING AWFUL MIGHT HAPPEN: SEVERAL DAYS
2. NOT BEING ABLE TO STOP OR CONTROL WORRYING: SEVERAL DAYS
3. WORRYING TOO MUCH ABOUT DIFFERENT THINGS: SEVERAL DAYS
6. BECOMING EASILY ANNOYED OR IRRITABLE: NOT AT ALL
GAD7 TOTAL SCORE: 5
8. IF YOU CHECKED OFF ANY PROBLEMS, HOW DIFFICULT HAVE THESE MADE IT FOR YOU TO DO YOUR WORK, TAKE CARE OF THINGS AT HOME, OR GET ALONG WITH OTHER PEOPLE?: SOMEWHAT DIFFICULT
4. TROUBLE RELAXING: SEVERAL DAYS
5. BEING SO RESTLESS THAT IT IS HARD TO SIT STILL: NOT AT ALL
GAD7 TOTAL SCORE: 5
7. FEELING AFRAID AS IF SOMETHING AWFUL MIGHT HAPPEN: SEVERAL DAYS

## 2022-03-24 NOTE — PATIENT INSTRUCTIONS
"  Twan Franco,  Thanks for submitting an e-visit.  It looks like your lorazepam use has been stable.  I refilled your lorazepam for another 3 months (60 tablets).  Continue to use this as sparingly as you can.      I do ask that all patients who are taking chronic medications for conditions that I am managing schedule an in-person visit with me at least once a year.  Your last office visit was in May of 2021 so I recommend that you schedule a routine preventive visit (\"annual physical\") in 3 months and we can do your next refill then.    Doreen Aguila MD  [facility]        "

## 2022-03-24 NOTE — TELEPHONE ENCOUNTER
Provider E-Visit time total (minutes): 7  1:01 PM   1:08 PM     I checked the [place] Prescription Monitoring Program website which showed no concerning activity.

## 2022-03-25 ENCOUNTER — TELEPHONE (OUTPATIENT)
Dept: FAMILY MEDICINE | Facility: CLINIC | Age: 57
End: 2022-03-25
Payer: COMMERCIAL

## 2022-03-25 ASSESSMENT — ANXIETY QUESTIONNAIRES: GAD7 TOTAL SCORE: 5

## 2022-03-25 NOTE — TELEPHONE ENCOUNTER
LVM regarding e-script system being down, discussed that the pharmacy wasn't accepting the faxed script, that we would have it up front for  and to call if she wanted us to hold until beginning of next week when e-script should be back up

## 2022-03-25 NOTE — TELEPHONE ENCOUNTER
Patient returned call. States she got notice that her prescription is ready for pickup so no further action needed.

## 2022-04-21 ENCOUNTER — OFFICE VISIT (OUTPATIENT)
Dept: OBGYN | Facility: CLINIC | Age: 57
End: 2022-04-21
Payer: COMMERCIAL

## 2022-04-21 VITALS
HEIGHT: 65 IN | SYSTOLIC BLOOD PRESSURE: 140 MMHG | WEIGHT: 200 LBS | BODY MASS INDEX: 33.32 KG/M2 | DIASTOLIC BLOOD PRESSURE: 94 MMHG

## 2022-04-21 DIAGNOSIS — N85.01 ENDOMETRIAL HYPERPLASIA, SIMPLE: ICD-10-CM

## 2022-04-21 DIAGNOSIS — R87.810 CERVICAL HIGH RISK HPV (HUMAN PAPILLOMAVIRUS) TEST POSITIVE: Primary | ICD-10-CM

## 2022-04-21 DIAGNOSIS — N95.1 PERIMENOPAUSE: ICD-10-CM

## 2022-04-21 PROCEDURE — 87624 HPV HI-RISK TYP POOLED RSLT: CPT | Performed by: OBSTETRICS & GYNECOLOGY

## 2022-04-21 PROCEDURE — 88175 CYTOPATH C/V AUTO FLUID REDO: CPT | Performed by: OBSTETRICS & GYNECOLOGY

## 2022-04-21 PROCEDURE — 99214 OFFICE O/P EST MOD 30 MIN: CPT | Performed by: OBSTETRICS & GYNECOLOGY

## 2022-04-21 RX ORDER — PROGESTERONE 100 MG/1
200 CAPSULE ORAL DAILY
Qty: 180 CAPSULE | Refills: 3 | Status: SHIPPED | OUTPATIENT
Start: 2022-04-21 | End: 2023-03-21

## 2022-04-21 NOTE — PROGRESS NOTES
GYN CLINIC VISIT  2022  CC: pap smear    HPI:  Joan is a 57 year old perimenopausal  female who presents for repeat pap smear    Menses are rare. LMP 10/2021. Wants to discuss menopausal status. Not interested in checking hormone levels today.   Occasional hot flushes. Using none for contraception.  She is not currently considering pregnancy.  Besides routine health maintenance, she has no other health concerns today .  Sees Dr. Aguila for annuals and does not want annual today, just pap.  Endocrine following her for elevated prolactin.  Got labs done (cholesterol, glucose) through work ([employer]).   Reviewed pap history: +high risk other HPV    19: NIL pap, + HR HPV (not 16 or 18) result, EMB neg for dysplasia. Plan cotest in 1 year.     20: NIL pap, + HR HPV (not 16 or 18) result. Plan N[place].    3/11/20 colp bx and ECC - no dysplasia. Plan: Would advise Mirena IUD, will likely plan repeat endometrial biopsy at the time of placement. Provider advised the pt.     21 Message sent to the provider to see if she wants a cotest now or a cotest and EMB.Plan cotest and Endometrial biopsy now.    21 Reminder Allan, Letter    03/3/21 EMB Neg, NIL Pap, + HR HPV (not 16 or 18) Plan 1 yr co-test.    Reviewed h/o simple endometrial hyperplasia.    Treated with prometrium. No bleeding since 10/2021. Does not want endometrial biopsy today.    3/2021 - Multiple fragments of benign nonphasic endometrium, negative for hyperplasia, atypia and malignancy.     3/2020 - simple hyperplasia without atypia    2019 - negative for hyperplasia    2018 - - Simple endometrial hyperplasia; no evidence of atypia or malignancy.    GYNECOLOGIC HISTORY:  Joan is not sexually active  History sexually transmitted infections:No STD history  STI testing offered?  Declined  KAITLIN exposure: Unknown      HISTORY:  OB History    Para Term  AB Living   0 0 0 0 0 0   SAB IAB Ectopic Multiple  Live Births   0 0 0 0 0     Past Medical History:   Diagnosis Date     Allergic rhinitis 08/11/2005     Anxiety disorder     Has Panic Attacks     Cervical high risk HPV (human papillomavirus) test positive 01/23/2019 01/23/2019, 02/05/20, 03/3/21     Depressive disorder 1992    and NATALIE. Taking Fluoxetine (40mg)     History of prolactinoma 2005    Dr. Fischer, [facility] \[facility]""     Hypertension 12/2016     Hypothyroidism 2014     Past Surgical History:   Procedure Laterality Date     BIOPSY  mid-90's    from cervix after abnormal Pap. Benign.     BUNIONECTOMY RT/LT  2005    Right Foot     BUNIONECTOMY RT/LT  2006    Left Foot      COLONOSCOPY  January 15, 2018    1 polyp - benign. Next exam in 10 years.     Family History   Problem Relation Age of Onset     Hypertension Father      Breast Cancer Mother         Cancer-free for 2 years     Cerebrovascular Disease Maternal Grandmother      Breast Cancer Maternal Grandmother      Colon Cancer Maternal Grandmother      Substance Abuse Maternal Grandfather      Anxiety Disorder Paternal Grandmother      Depression Paternal Grandfather      Breast Cancer Other         Mat. Aunt - cancer free now     Substance Abuse Brother      Social History     Socioeconomic History     Marital status: Single     Spouse name: Single     Number of children: 0     Years of education: None     Highest education level: None   Occupational History     Occupation:      Employer: [employer]   Tobacco Use     Smoking status: Never Smoker     Smokeless tobacco: Never Used   Substance and Sexual Activity     Alcohol use: Not Currently     Comment: Less than 4 servings (drinks) per month     Drug use: No     Sexual activity: Not Currently     Partners: Male   Other Topics Concern     Parent/sibling w/ CABG, MI or angioplasty before 65F 55M? 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[employer], desk job, a lot of computer work, lives  "with her dog       Current Outpatient Medications:      amLODIPine (NORVASC) 5 MG tablet, TAKE 1 TABLET BY MOUTH  DAILY., Disp: 90 tablet, Rfl: 3     cetirizine (ZYRTEC) 10 MG tablet, Take 10 mg by mouth daily, Disp: , Rfl:      cholecalciferol (VITAMIN D3) 1000 UNIT tablet, Take 3 tablets (3,000 Units) by mouth daily, Disp: , Rfl:      desonide (DESOWEN) 0.05 % external ointment, Apply topically 2 times daily as needed (to affected area) up to one week at a time., Disp: 15 g, Rfl: 1     FLUoxetine (PROZAC) 20 MG capsule, TAKE 2 CAPSULES BY MOUTH  DAILY, Disp: 180 capsule, Rfl: 1     fluticasone (FLONASE) 50 MCG/ACT nasal spray, Spray 2 sprays into both nostrils daily, Disp: , Rfl:      levothyroxine (SYNTHROID/LEVOTHROID) 100 MCG tablet, TAKE 1 TABLET BY MOUTH  DAILY, Disp: 90 tablet, Rfl: 3     LORazepam (ATIVAN) 0.5 MG tablet, TAKE 1 TABLET(0.5 MG) BY MOUTH DAILY AS NEEDED FOR ANXIETY, Disp: 60 tablet, Rfl: 0     progesterone (PROMETRIUM) 100 MG capsule, Take 2 capsules (200 mg) by mouth daily, Disp: 180 capsule, Rfl: 3     VALERIAN ROOT PO, Take 1,000 mg by mouth, Disp: , Rfl:      Allergies   Allergen Reactions     No Known Drug Allergies          EXAM:  BP (!) 140/94   Ht 1.638 m (5' 4.5\")   Wt 90.7 kg (200 lb)   BMI 33.80 kg/m     BMI: Body mass index is 33.8 kg/m .  Constitutional: healthy, alert and no distress    :  Vulva:  No external lesions, normal female hair distribution, no inguinal adenopathy.    Urethra:  Midline, non-tender, well supported, no discharge  Vagina:  Moist, pink, no abnormal discharge, no lesions  Cervix: medium tashi speculum used - nulliparous normal appearing cervix.  Uterus:  Normal size, non-tender, freely mobile  Ovaries:  No masses appreciated, non-tender, mobile  Rectal Exam: deferred  Musculoskeletal: extremities normal  Skin: no suspicious lesions or rashes  Psychiatric: Affect appropriate, cooperative,mentation appears normal.     COUNSELING:   Reviewed preventive " health counseling, as reflected in patient instructions       Regular exercise       Healthy diet/nutrition       (Joanie)menopause management      ASSESSMENT:  57 year old perimenopausal female with history of high risk HPV other and simple endometrial hyperplasia.    PLAN:  1. Cervical high risk HPV (human papillomavirus) test positive  Follow up per ASCCP guidelines  - Pap diagnostic with HPV    2. Perimenopause  Discussed definition of menopause = 1 year no bleeding. Not there yet. Mild vasomotor sx. Offered FSH, estradiol lab test today, declined. Better to avoid estrogen HRT if possible given h/o hyperplasia.     3. Endometrial hyperplasia, simple  Biopsy benign last year, no hyperplasia. On prometrium 200mg PO daily.  Discussed low threshold to repeat endometrial biopsy.  - progesterone (PROMETRIUM) 100 MG capsule; Take 2 capsules (200 mg) by mouth daily  Dispense: 180 capsule; Refill: 3    Giana Deal MD

## 2022-04-21 NOTE — PATIENT INSTRUCTIONS
Endometrial Biopsy  Post-Procedure Patient Instructions      Please monitor for any of the following:      Fever   Cramping after 48 hours   Bleeding for 24-48 hours that is heavier than a normal period   Any bleeding that soaks more than 1 pad per hour      Please call your health care provider if you develop any of the above symptoms or problems.     [facility]   Nurse Triage Line [phone]      Use pads, not tampons, for the bleeding. You may resume sexual relations in 2-3 days after bleeding has stopped.

## 2022-04-25 LAB
BKR LAB AP GYN ADEQUACY: NORMAL
BKR LAB AP GYN INTERPRETATION: NORMAL
BKR LAB AP HPV REFLEX: NORMAL
BKR LAB AP LMP: NORMAL
BKR LAB AP PREVIOUS ABNL DX: NORMAL
BKR LAB AP PREVIOUS ABNORMAL: NORMAL
PATH REPORT.COMMENTS IMP SPEC: NORMAL
PATH REPORT.COMMENTS IMP SPEC: NORMAL
PATH REPORT.RELEVANT HX SPEC: NORMAL

## 2022-04-27 LAB
HUMAN PAPILLOMA VIRUS 16 DNA: NEGATIVE
HUMAN PAPILLOMA VIRUS 18 DNA: NEGATIVE
HUMAN PAPILLOMA VIRUS FINAL DIAGNOSIS: ABNORMAL
HUMAN PAPILLOMA VIRUS OTHER HR: POSITIVE

## 2022-04-28 ENCOUNTER — PATIENT OUTREACH (OUTPATIENT)
Dept: OBGYN | Facility: CLINIC | Age: 57
End: 2022-04-28
Payer: COMMERCIAL

## 2022-04-28 DIAGNOSIS — R87.810 CERVICAL HIGH RISK HPV (HUMAN PAPILLOMAVIRUS) TEST POSITIVE: Primary | ICD-10-CM

## 2022-05-10 ENCOUNTER — TELEPHONE (OUTPATIENT)
Dept: FAMILY MEDICINE | Facility: CLINIC | Age: 57
End: 2022-05-10
Payer: COMMERCIAL

## 2022-05-10 NOTE — TELEPHONE ENCOUNTER
Reason for Call:  Other call back    Detailed comments: patient need to make an appointment for  a colposcopy     Phone Number Patient can be reached at: Home number on file [phone] (home)    Best Time: any    Can we leave a detailed message on this number? YES    Call taken on 5/10/2022 at 1:42 PM by Yue Mendenhall

## 2022-05-20 DIAGNOSIS — F41.1 GAD (GENERALIZED ANXIETY DISORDER): ICD-10-CM

## 2022-05-23 RX ORDER — LORAZEPAM 0.5 MG/1
TABLET ORAL
Qty: 60 TABLET | Refills: 0 | OUTPATIENT
Start: 2022-05-23

## 2022-05-23 NOTE — TELEPHONE ENCOUNTER
Yes, she is due for in-person visit in late June for preventive physical with pap as well as her 6-month anxiety follow-up.      This refill request is early so I denied it.   She is not due for a refill until the end of June at the soonest.  (Her controlled substance agreement is for #60 tablets every 3 months.)  Of note, the 60 tablets used to last her 4 or 5 months, then 3 months, and now 2 months?  That is concerning for overuse.  I would need to see her before additional refills.    Doreen Aguila MD

## 2022-05-23 NOTE — TELEPHONE ENCOUNTER
"Routing refill request to provider for review/approval because:  --Drug not on the [facility] refill protocol Ativan.      --Last order in chart:   Disp Refills Start End MANDY   LORazepam (ATIVAN) 0.5 MG tablet 60 tablet 0 3/24/2022  No   Sig: TAKE 1 TABLET(0.5 MG) BY MOUTH DAILY AS NEEDED FOR ANXIETY       --Last visit:  12/22/2021 video visit with Mingo - You are due for a routine preventive visit (\"annual physical\") with pap smear and labs in early June.       --Future Visit: none with FP.    "

## 2022-05-24 NOTE — TELEPHONE ENCOUNTER
I called pt, she says this was an error and she apologizes.     She tried requesting for Fluoxetine, not Lorazepam and as soon as she saw the request went through for Lorazepam she called [facility] to let them know it was a mistake.     She knows she is not due until end of June and says she has plenty left.    Luly Garcia, TIFFANYN RN  [facility]

## 2022-06-09 ENCOUNTER — OFFICE VISIT (OUTPATIENT)
Dept: OBGYN | Facility: CLINIC | Age: 57
End: 2022-06-09
Payer: COMMERCIAL

## 2022-06-09 VITALS
HEART RATE: 80 BPM | SYSTOLIC BLOOD PRESSURE: 155 MMHG | WEIGHT: 207.9 LBS | DIASTOLIC BLOOD PRESSURE: 82 MMHG | BODY MASS INDEX: 35.13 KG/M2

## 2022-06-09 DIAGNOSIS — R87.810 PAP SMEAR OF CERVIX SHOWS HIGH RISK HPV PRESENT: Primary | ICD-10-CM

## 2022-06-09 PROCEDURE — 88305 TISSUE EXAM BY PATHOLOGIST: CPT | Performed by: PATHOLOGY

## 2022-06-09 PROCEDURE — 57454 BX/CURETT OF CERVIX W/SCOPE: CPT | Performed by: OBSTETRICS & GYNECOLOGY

## 2022-06-09 NOTE — PROGRESS NOTES
"Joan Lam is a 57 year old female  who presents for repeat colposcopy.    Pap history as follows:  19: NIL pap, + HR HPV (not 16 or 18) result, EMB neg for dysplasia. Plan cotest in 1 year.   20: NIL pap, + HR HPV (not 16 or 18) result. Plan [place].  3/11/20 colp bx and ECC - no dysplasia. Plan: Would advise Mirena IUD, will likely plan repeat endometrial biopsy at the time of placement.    03/3/21 EMB Neg, NIL Pap, + HR HPV (not 16 or 18) Plan 1 yr co-test.  22 NIL, +HR HPV, not 16/18. Plan [place]worth bef 22 / pt notified  22 Left message and MyChart letter    No LMP recorded.  UPT today is NA  Patient does not smoke  Type of contraception: none  Age at first sexual intercourse: 20  Number of sexual partners (lifetime): more than 6  Past GYN history: HPV  Prior cervical/vaginal disease: Normal exam without visible pathology.  Prior cervical treatment: cryosurgery.    Vitals:    22 1255   BP: (!) 155/82   Pulse: 80   Weight: 94.3 kg (207 lb 14.4 oz)     Estimated body mass index is 35.13 kg/m  as calculated from the following:    Height as of 22: 1.638 m (5' 4.5\").    Weight as of this encounter: 94.3 kg (207 lb 14.4 oz).     PROCEDURE:  Before the procedure, it was ensured that the patient was educated regarding the nature of her findings to date, the implications, and what was to be done. She has been made aware of the role of HPV, the natural history of infection, ways to minimize her future risk, the effect of HPV on the cervix, and treatment options available should they be indicated. The details of the colposcopic procedure were reviewed. All questions were answered before proceeding, and informed consent was therefore obtained.      Speculum placed in vagina and excellent visualization of cervix acheived, cervix swabbed x 3 with acetic acid solution.      FINDINGS:  Cervix: acetowhitening noted at 3o'clock with some abnormal vessels, possible nabothian cyst  SCJ " seen?: no   ECC done?: Yes   Satisfactory examination?: no  Representative biopsy taken at 3 o'clock. ECC performed with curette followed by cytobrush. Silver nitrate and pressure applied to achieve hemostasis. Patient tolerated procedure well. Specimens labeled and sent to pathology.    ASSESSMENT: HPV related changes.    PLAN: specimens labelled and sent to Pathology, will base further treatment on Pathology findings, treatment options discussed with patient, post biopsy instructions given to patient and call to discuss Pathology results if treatment needed, otherwise mychart message.    1. Pap smear of cervix shows high risk HPV present  - Surgical Pathology Exam  - COLP CERVIX/UPPER VAGINA W BX CERVIX/ENDOCERV CURETT        Giana Deal MD

## 2022-06-13 LAB
PATH REPORT.COMMENTS IMP SPEC: NORMAL
PATH REPORT.COMMENTS IMP SPEC: NORMAL
PATH REPORT.FINAL DX SPEC: NORMAL
PATH REPORT.GROSS SPEC: NORMAL
PATH REPORT.MICROSCOPIC SPEC OTHER STN: NORMAL
PATH REPORT.RELEVANT HX SPEC: NORMAL
PHOTO IMAGE: NORMAL

## 2022-06-17 ENCOUNTER — PATIENT OUTREACH (OUTPATIENT)
Dept: OBGYN | Facility: CLINIC | Age: 57
End: 2022-06-17
Payer: COMMERCIAL

## 2022-06-17 DIAGNOSIS — R87.810 CERVICAL HIGH RISK HPV (HUMAN PAPILLOMAVIRUS) TEST POSITIVE: Primary | ICD-10-CM

## 2022-06-25 ENCOUNTER — HEALTH MAINTENANCE LETTER (OUTPATIENT)
Age: 57
End: 2022-06-25

## 2022-07-13 ENCOUNTER — TELEPHONE (OUTPATIENT)
Dept: ENDOCRINOLOGY | Facility: CLINIC | Age: 57
End: 2022-07-13

## 2022-07-13 NOTE — TELEPHONE ENCOUNTER
Left Voicemail (2nd Attempt) for the patient to call back and schedule the following:    Appointment type: return   Provider: dr. stevenson  Return date: 1/21/2023  Specialty phone number: [phone]     Lorraine marie Procedure   Orthopedics, Podiatry, Sports Medicine, ENT/Eye Specialties  [facility]   [phone]

## 2022-07-17 DIAGNOSIS — F33.42 MAJOR DEPRESSIVE DISORDER, RECURRENT EPISODE, IN FULL REMISSION (H): ICD-10-CM

## 2022-07-17 DIAGNOSIS — F41.1 GAD (GENERALIZED ANXIETY DISORDER): ICD-10-CM

## 2022-07-20 NOTE — TELEPHONE ENCOUNTER
"MyChart reminder sent with PHQ-9.    --Plan in e-visit 3/24/22- I do ask that all patients who are taking chronic medications for conditions that I am managing schedule an in-person visit with me at least once a year.  Your last office visit was in May of 2021 so I recommend that you schedule a routine preventive visit (\"annual physical\") in 3 months and we can do your next refill then.      ---Prescription approved per [facility] Refill Protocol.       Cristina Butler RN BSN     [facility]          Last office visit: 12/22/21 virtual.    Future office visit: none.      PHQ 1/30/2020 9/3/2020 12/22/2021   PHQ-9 Total Score 1 3 3   Q9: Thoughts of better off dead/self-harm past 2 weeks Not at all Not at all Not at all       "

## 2022-08-08 ENCOUNTER — MYC MEDICAL ADVICE (OUTPATIENT)
Dept: FAMILY MEDICINE | Facility: CLINIC | Age: 57
End: 2022-08-08

## 2022-08-08 DIAGNOSIS — H01.139 ECZEMA OF EYELID, UNSPECIFIED LATERALITY: ICD-10-CM

## 2022-08-11 RX ORDER — DESONIDE 0.5 MG/G
OINTMENT TOPICAL 2 TIMES DAILY PRN
Qty: 15 G | Refills: 1 | Status: SHIPPED | OUTPATIENT
Start: 2022-08-11 | End: 2024-01-17

## 2022-08-11 NOTE — TELEPHONE ENCOUNTER
Dr. Aguila-please review, sign if agree and may close encounter.    Routing refill request to provider for review/approval because:  Drug not on the [facility] refill protocol     Last Written Prescription Date:  9/5/19  Last Fill Quantity: 15 g,  # refills: 1   Last office visit: 12/22/21 with prescribing provider:  Dr. Aguila   Future Office Visit:        Thank you!  TIFFANY AshbyN, RN  [facility]

## 2022-08-15 DIAGNOSIS — E03.9 HYPOTHYROIDISM, UNSPECIFIED TYPE: ICD-10-CM

## 2022-08-18 RX ORDER — LEVOTHYROXINE SODIUM 100 UG/1
TABLET ORAL
Qty: 90 TABLET | Refills: 3 | Status: SHIPPED | OUTPATIENT
Start: 2022-08-18 | End: 2023-07-22

## 2022-08-18 NOTE — CONFIDENTIAL NOTE
Medication is being filled for 1 time refill only due to:  Patient needs labs TSH.  Future appt 9/13/22    YELENA Rapp RN  [facility]

## 2022-08-31 DIAGNOSIS — I10 ESSENTIAL HYPERTENSION WITH GOAL BLOOD PRESSURE LESS THAN 140/90: ICD-10-CM

## 2022-09-01 RX ORDER — AMLODIPINE BESYLATE 5 MG/1
TABLET ORAL
Qty: 90 TABLET | Refills: 0 | Status: SHIPPED | OUTPATIENT
Start: 2022-09-01 | End: 2022-09-13

## 2022-09-01 NOTE — TELEPHONE ENCOUNTER
Medication is being filled for 1 time refill only due to:  Patient needs to be seen because due for visit.   Future appt 9/13/22  YELENA Rapp RN  [facility]

## 2022-09-11 NOTE — PATIENT INSTRUCTIONS
"Please be sure you are selecting the correct visit type if you schedule your appointments on [facility].  (You selected \"Office Visit\" rather than \"Preventive Visit.\"  I did catch this in advance and changed it but I can't guarantee that we will always catch this in advance.)    Do try to include more potassium-rich foods in your diet.  This would include bananas, melons (honey dew, cantaloupe, watermelon), kiwi, pears, dried fruit (such as apricots, raisins and dates), potatoes (including sweet potatoes), asparagus, avocado, broccoli, spinach, beans (white ,red, soto, lima), tomato sauce, salmon, scallops, and tuna.       Patient Education    If you have MyChart:  1) I kindly request that you check your MyChart prior to all appointments with me and complete any assigned questionnaires ahead of time.    2) You may receive auto-released results from our system before I have the opportunity to review and comment.  Be assured I will review and comment on all of your results as soon as I can.      If you do not have MyChart:  1) I encourage you to sign up for Mychart ([email or web address]).  This will allow you to review your results, securely communicate with your care team, and schedule virtual visits as well.  2) Please be aware that result letters from the clinic can take up to 2 weeks but urgent results will be called to you.      FYI:  1) I do virtual (video) visits exclusively on Wednesdays.  I still do in-person visits at [facility] ([phone]) on Mondays, Tuesdays and Thursdays.  You can schedule a video visit for many conditions.  Please follow this link:  [email or web address]  2) My schedule has been booking out very far in advance (2 months).  I apologize for the lack of timely access.  If you need to be seen for a chronic condition or preventive (wellness) visit, please be sure to schedule that appointment 2-3 months in advance.  If " you have a concern that you feel cannot wait until my next available appointment (such as a hospital follow-up or new symptom of concern) please ask to speak to one of the [facility] nurses who may be able to access a sooner appointment.    I do ask that all patients who are taking chronic medications for conditions that I am managing schedule an in-person visit with me at least once a year.     To schedule any ordered imaging studies (including mammogram and/or DEXA scan) you can call [facility] Scheduling at [phone].      Preventive Health Recommendations  Female Ages 50 - 64    Yearly exam: See your health care provider every year in order to  Review health changes.   Discuss preventive care.    Review your medicines if your doctor has prescribed any.    Get a Pap test every three years (unless you have an abnormal result and your provider advises testing more often).  If you get Pap tests with HPV test, you only need to test every 5 years, unless you have an abnormal result.   You do not need a Pap test if your uterus was removed (hysterectomy) and you have not had cancer.  You should be tested each year for STDs (sexually transmitted diseases) if you're at risk.   Have a mammogram every 1 to 2 years.  Have a colonoscopy at age 50, or have a yearly FIT test (stool test). These exams screen for colon cancer.    Have a cholesterol test every 5 years, or more often if advised.  Have a diabetes test (fasting glucose) every three years. If you are at risk for diabetes, you should have this test more often.   If you are at risk for osteoporosis (brittle bone disease), think about having a bone density scan (DEXA).    Shots: Get a flu shot each year. Get a tetanus shot every 10 years.    Nutrition:   Eat at least 5 servings of fruits and vegetables each day.  Eat whole-grain bread, whole-wheat pasta and brown rice instead of white grains and rice.  Get adequate Calcium and Vitamin D.      Lifestyle  Exercise at least 150 minutes a week (30 minutes a day, 5 days a week). This will help you control your weight and prevent disease.  Limit alcohol to one drink per day.  No smoking.   Wear sunscreen to prevent skin cancer.   See your dentist every six months for an exam and cleaning.  See your eye doctor every 1 to 2 years.

## 2022-09-13 ENCOUNTER — OFFICE VISIT (OUTPATIENT)
Dept: FAMILY MEDICINE | Facility: CLINIC | Age: 57
End: 2022-09-13
Payer: COMMERCIAL

## 2022-09-13 VITALS
WEIGHT: 206.13 LBS | OXYGEN SATURATION: 98 % | BODY MASS INDEX: 34.34 KG/M2 | HEART RATE: 73 BPM | SYSTOLIC BLOOD PRESSURE: 137 MMHG | RESPIRATION RATE: 12 BRPM | HEIGHT: 65 IN | DIASTOLIC BLOOD PRESSURE: 86 MMHG | TEMPERATURE: 98.1 F

## 2022-09-13 DIAGNOSIS — Z23 NEED FOR VACCINATION: ICD-10-CM

## 2022-09-13 DIAGNOSIS — F33.42 MAJOR DEPRESSIVE DISORDER, RECURRENT EPISODE, IN FULL REMISSION (H): ICD-10-CM

## 2022-09-13 DIAGNOSIS — F41.1 GAD (GENERALIZED ANXIETY DISORDER): ICD-10-CM

## 2022-09-13 DIAGNOSIS — Z00.00 ROUTINE GENERAL MEDICAL EXAMINATION AT A HEALTH CARE FACILITY: Primary | ICD-10-CM

## 2022-09-13 DIAGNOSIS — E03.9 ACQUIRED HYPOTHYROIDISM: ICD-10-CM

## 2022-09-13 DIAGNOSIS — Z13.220 LIPID SCREENING: ICD-10-CM

## 2022-09-13 DIAGNOSIS — I10 ESSENTIAL HYPERTENSION WITH GOAL BLOOD PRESSURE LESS THAN 140/90: ICD-10-CM

## 2022-09-13 LAB
ANION GAP SERPL CALCULATED.3IONS-SCNC: 7 MMOL/L (ref 3–14)
BUN SERPL-MCNC: 11 MG/DL (ref 7–30)
CALCIUM SERPL-MCNC: 8.9 MG/DL (ref 8.5–10.1)
CHLORIDE BLD-SCNC: 108 MMOL/L (ref 94–109)
CHOLEST SERPL-MCNC: 143 MG/DL
CO2 SERPL-SCNC: 25 MMOL/L (ref 20–32)
CREAT SERPL-MCNC: 0.73 MG/DL (ref 0.52–1.04)
FASTING STATUS PATIENT QL REPORTED: YES
GFR SERPL CREATININE-BSD FRML MDRD: >90 ML/MIN/1.73M2
GLUCOSE BLD-MCNC: 102 MG/DL (ref 70–99)
HDLC SERPL-MCNC: 34 MG/DL
LDLC SERPL CALC-MCNC: 75 MG/DL
NONHDLC SERPL-MCNC: 109 MG/DL
POTASSIUM BLD-SCNC: 3.6 MMOL/L (ref 3.4–5.3)
SODIUM SERPL-SCNC: 140 MMOL/L (ref 133–144)
TRIGL SERPL-MCNC: 169 MG/DL
TSH SERPL DL<=0.005 MIU/L-ACNC: 0.82 MU/L (ref 0.4–4)

## 2022-09-13 PROCEDURE — 99396 PREV VISIT EST AGE 40-64: CPT | Mod: 25 | Performed by: FAMILY MEDICINE

## 2022-09-13 PROCEDURE — 80061 LIPID PANEL: CPT | Performed by: FAMILY MEDICINE

## 2022-09-13 PROCEDURE — 90471 IMMUNIZATION ADMIN: CPT | Performed by: FAMILY MEDICINE

## 2022-09-13 PROCEDURE — 36415 COLL VENOUS BLD VENIPUNCTURE: CPT | Performed by: FAMILY MEDICINE

## 2022-09-13 PROCEDURE — 84443 ASSAY THYROID STIM HORMONE: CPT | Performed by: FAMILY MEDICINE

## 2022-09-13 PROCEDURE — 80048 BASIC METABOLIC PNL TOTAL CA: CPT | Performed by: FAMILY MEDICINE

## 2022-09-13 PROCEDURE — 90715 TDAP VACCINE 7 YRS/> IM: CPT | Performed by: FAMILY MEDICINE

## 2022-09-13 PROCEDURE — 96127 BRIEF EMOTIONAL/BEHAV ASSMT: CPT | Performed by: FAMILY MEDICINE

## 2022-09-13 PROCEDURE — 99214 OFFICE O/P EST MOD 30 MIN: CPT | Mod: 25 | Performed by: FAMILY MEDICINE

## 2022-09-13 RX ORDER — AMLODIPINE BESYLATE 5 MG/1
TABLET ORAL
Qty: 90 TABLET | Refills: 3 | Status: SHIPPED | OUTPATIENT
Start: 2022-09-13 | End: 2022-10-28

## 2022-09-13 RX ORDER — LORAZEPAM 0.5 MG/1
TABLET ORAL
Qty: 60 TABLET | Refills: 0 | Status: SHIPPED | OUTPATIENT
Start: 2022-09-13 | End: 2023-08-01

## 2022-09-13 ASSESSMENT — ENCOUNTER SYMPTOMS
HEADACHES: 1
WEAKNESS: 0
ABDOMINAL PAIN: 0
EYE PAIN: 0
HEMATURIA: 0
JOINT SWELLING: 0
ARTHRALGIAS: 1
MYALGIAS: 0
NAUSEA: 0
CHILLS: 0
DIARRHEA: 0
FEVER: 0
DYSURIA: 0
COUGH: 0
HEARTBURN: 1
DIZZINESS: 0
PARESTHESIAS: 0
SORE THROAT: 0
HEMATOCHEZIA: 0
CONSTIPATION: 0
PALPITATIONS: 0
NERVOUS/ANXIOUS: 0
FREQUENCY: 0
SHORTNESS OF BREATH: 0
BREAST MASS: 0

## 2022-09-13 ASSESSMENT — PATIENT HEALTH QUESTIONNAIRE - PHQ9
SUM OF ALL RESPONSES TO PHQ QUESTIONS 1-9: 0
SUM OF ALL RESPONSES TO PHQ QUESTIONS 1-9: 0
10. IF YOU CHECKED OFF ANY PROBLEMS, HOW DIFFICULT HAVE THESE PROBLEMS MADE IT FOR YOU TO DO YOUR WORK, TAKE CARE OF THINGS AT HOME, OR GET ALONG WITH OTHER PEOPLE: NOT DIFFICULT AT ALL

## 2022-09-13 ASSESSMENT — ANXIETY QUESTIONNAIRES
GAD7 TOTAL SCORE: 1
4. TROUBLE RELAXING: NOT AT ALL
GAD7 TOTAL SCORE: 1
6. BECOMING EASILY ANNOYED OR IRRITABLE: NOT AT ALL
2. NOT BEING ABLE TO STOP OR CONTROL WORRYING: NOT AT ALL
1. FEELING NERVOUS, ANXIOUS, OR ON EDGE: SEVERAL DAYS
IF YOU CHECKED OFF ANY PROBLEMS ON THIS QUESTIONNAIRE, HOW DIFFICULT HAVE THESE PROBLEMS MADE IT FOR YOU TO DO YOUR WORK, TAKE CARE OF THINGS AT HOME, OR GET ALONG WITH OTHER PEOPLE: NOT DIFFICULT AT ALL
3. WORRYING TOO MUCH ABOUT DIFFERENT THINGS: NOT AT ALL
GAD7 TOTAL SCORE: 1
8. IF YOU CHECKED OFF ANY PROBLEMS, HOW DIFFICULT HAVE THESE MADE IT FOR YOU TO DO YOUR WORK, TAKE CARE OF THINGS AT HOME, OR GET ALONG WITH OTHER PEOPLE?: NOT DIFFICULT AT ALL
5. BEING SO RESTLESS THAT IT IS HARD TO SIT STILL: NOT AT ALL
7. FEELING AFRAID AS IF SOMETHING AWFUL MIGHT HAPPEN: NOT AT ALL
7. FEELING AFRAID AS IF SOMETHING AWFUL MIGHT HAPPEN: NOT AT ALL

## 2022-09-13 NOTE — PROGRESS NOTES
SUBJECTIVE:   CC: Joan Lam is an 57 year old woman who presents for preventive health visit.       Patient has been advised of split billing requirements and indicates understanding: Yes  Healthy Habits:     Getting at least 3 servings of Calcium per day:  Yes    Bi-annual eye exam:  NO    Dental care twice a year:  Yes    Sleep apnea or symptoms of sleep apnea:  None    Diet:  Regular (no restrictions)    Frequency of exercise:  4-5 days/week    Duration of exercise:  15-30 minutes    Taking medications regularly:  Yes    Medication side effects:  None    PHQ-2 Total Score: 0    Additional concerns today:  No      Hypertension Follow-up    Do you check your blood pressure regularly outside of the clinic? Yes     Are you following a low salt diet? Yes - trying to    Are your blood pressures ever more than 140 on the top number (systolic) OR more   than 90 on the bottom number (diastolic), for example 140/90? Yes  Home BP's have been 140's-150/80's-90    Depression and Anxiety Follow-Up    How are you doing with your depression since your last visit? No change    How are you doing with your anxiety since your last visit?  No change    Have you had a significant life event? Work stress     Do you have any concerns with your use of alcohol or other drugs? No    Social History     Tobacco Use     Smoking status: Never Smoker     Smokeless tobacco: Never Used   Substance Use Topics     Alcohol use: Yes     Comment: once a month-2 drinks each time     Drug use: No     PHQ 9/3/2020 12/22/2021 9/13/2022   PHQ-9 Total Score 3 3 0   Q9: Thoughts of better off dead/self-harm past 2 weeks Not at all Not at all Not at all     NATALIE-7 SCORE 12/22/2021 3/24/2022 9/13/2022   Total Score - - -   Total Score 7 (mild anxiety) 5 (mild anxiety) 1 (minimal anxiety)   Total Score 7 5 1     Answers for HPI/ROS submitted by the patient on 9/13/2022  If you checked off any problems, how difficult have these problems made it for you  to do your work, take care of things at home, or get along with other people?: Not difficult at all  PHQ9 TOTAL SCORE: 0  NATALIE 7 TOTAL SCORE: 1    Today's PHQ-2 Score:   PHQ-2 ( 1999 [employer]) 9/13/2022   Q1: Little interest or pleasure in doing things 0   Q2: Feeling down, depressed or hopeless 0   PHQ-2 Score 0   PHQ-2 Total Score (12-17 Years)- Positive if 3 or more points; Administer PHQ-A if positive -   Q1: Little interest or pleasure in doing things Not at all   Q2: Feeling down, depressed or hopeless Not at all   PHQ-2 Score 0       Abuse: Current or Past (Physical, Sexual or Emotional) - No  Do you feel safe in your environment? Yes        Social History     Tobacco Use     Smoking status: Never Smoker     Smokeless tobacco: Never Used   Substance Use Topics     Alcohol use: Yes     Comment: once a month-2 drinks each time     If you drink alcohol do you typically have >3 drinks per day or >7 drinks per week? No    Alcohol Use 9/13/2022   Prescreen: >3 drinks/day or >7 drinks/week? No   Prescreen: >3 drinks/day or >7 drinks/week? -       Reviewed orders with patient.  Reviewed health maintenance and updated orders accordingly - Yes  BP Readings from Last 3 Encounters:   09/13/22 137/86   06/09/22 (!) 155/82   04/21/22 (!) 140/94    Wt Readings from Last 3 Encounters:   09/13/22 93.5 kg (206 lb 2 oz)   06/09/22 94.3 kg (207 lb 14.4 oz)   04/21/22 90.7 kg (200 lb)              Breast Cancer Screening:  FHS-7:   Breast CA Risk Assessment (FHS-7) 5/25/2021 3/18/2022 9/13/2022   Did any of your first-degree relatives have breast or ovarian cancer? Yes Yes Yes   Did any of your relatives have bilateral breast cancer? Unknown No Unknown   Did any man in your family have breast cancer? Unknown No No   Did any woman in your family have breast and ovarian cancer? Yes No Yes   Did any woman in your family have breast cancer before age 50 y? Unknown No Unknown   Do you have 2 or more relatives with breast and/or ovarian  cancer? Yes Yes Unknown   Do you have 2 or more relatives with breast and/or bowel cancer? Yes Yes Yes   Mammogram Screening: Recommended mammography every 1-2 years with patient discussion and risk factor consideration  Pertinent mammograms are reviewed under the imaging tab.      History of abnormal Pap smear: YES - updated in Problem List and Health Maintenance accordingly  PAP / HPV Latest Ref Rng & Units 2022 3/3/2021 2020   PAP   Negative for Intraepithelial Lesion or Malignancy (NILM) - -   PAP (Historical) - - NIL NIL   HPV16 Negative Negative Negative Negative   HPV18 Negative Negative Negative Negative   HRHPV Negative Positive(A) Positive(A) Positive(A)     Reviewed and updated as needed this visit by clinical staff   Tobacco  Allergies  Meds  Problems  Med Hx              Reviewed and updated as needed this visit by Provider      Problems  Med Hx             Past Medical History:   Diagnosis Date     Allergic rhinitis 2005     Anxiety disorder     Has Panic Attacks     Cervical high risk HPV (human papillomavirus) test positive 2019, 20, 03/3/21     Depressive disorder     and NATALIE. Taking Fluoxetine (40mg)     History of prolactinoma     Dr. Fischer, [facility]     Hypertension 2016     Hypothyroidism       Past Surgical History:   Procedure Laterality Date     BIOPSY  mid-90's    from cervix after abnormal Pap. Benign.     BUNIONECTOMY RT/LT      Right Foot     BUNIONECTOMY RT/LT      Left Foot      COLONOSCOPY  January 15, 2018    1 polyp - benign. Next exam in 10 years.     OB History    Para Term  AB Living   0 0 0 0 0 0   SAB IAB Ectopic Multiple Live Births   0 0 0 0 0       Review of Systems   Constitutional: Negative for chills and fever.   HENT: Positive for congestion. Negative for ear pain, hearing loss and sore throat.    Eyes: Positive for visual disturbance. Negative for pain.   Respiratory:  "Negative for cough and shortness of breath.    Cardiovascular: Negative for chest pain, palpitations and peripheral edema.   Gastrointestinal: Positive for heartburn. Negative for abdominal pain, constipation, diarrhea, hematochezia and nausea.   Breasts:  Negative for breast mass and discharge.   Genitourinary: Negative for dysuria, frequency, genital sores, hematuria, pelvic pain, urgency, vaginal bleeding and vaginal discharge.   Musculoskeletal: Positive for arthralgias. Negative for joint swelling and myalgias.   Skin: Negative for rash.   Neurological: Positive for headaches. Negative for dizziness, weakness and paresthesias.   Psychiatric/Behavioral: Negative for mood changes. The patient is not nervous/anxious.      Allergies, needs a routine eye exam, occasional flares of heartburn that have improved since she stopped eating past 7 pm - occasionally uses a 2-week course of PPI which controls her symptoms.       OBJECTIVE:   /86   Pulse 73   Temp 98.1  F (36.7  C) (Oral)   Resp 12   Ht 1.655 m (5' 5.16\")   Wt 93.5 kg (206 lb 2 oz)   LMP 02/21/2021 (LMP Unknown)   SpO2 98%   BMI 34.14 kg/m    Physical Exam  GENERAL APPEARANCE: healthy, alert and no distress  EYES: Eyes grossly normal to inspection, conjunctivae and sclerae normal  HENT: ear canals and TM's normal  NECK: no adenopathy, no asymmetry, masses, or scars and thyroid normal to palpation  RESP: lungs clear to auscultation - no rales, rhonchi or wheezes  CV: regular rate and rhythm, normal S1 S2, no S3 or S4, no murmur, click or rub, no peripheral edema   ABDOMEN: soft, nontender, no hepatosplenomegaly, no masses   MS: no musculoskeletal defects are noted and gait is age appropriate without ataxia  SKIN: no suspicious lesions or rashes  NEURO: Normal strength and tone, sensory exam grossly normal, mentation intact and speech normal  PSYCH: mentation appears normal and affect normal/bright       ASSESSMENT/PLAN:     Routine general " "medical examination at a health care facility  Reviewed/updated HM      Acquired hypothyroidism  Continue replacement therapy with levothyroxine.  Awaiting TSH results to determine if dose change is indicated.   - TSH WITH FREE T4 REFLEX    Essential hypertension with goal blood pressure less than 140/90  Clinic BP reading is at goal but patient reports higher readings at home.  We'll continue medication at current dose but discussed lifestyle approaches to improving BP including exercise, DASH diet with low sodium and high potassium, reduced alcohol (no more than one drink/day and 7 drinks/week).    - amLODIPine (NORVASC) 5 MG tablet  Dispense: 90 tablet; Refill: 3  - BASIC METABOLIC PANEL    Lipid screening  - Lipid panel reflex to direct LDL Fasting    Adult BMI 34.0-34.9 kg/sq m  - Comprehensive Weight Management    NATALIE (generalized anxiety disorder)  Major depressive disorder, recurrent episode, in full remission (H)  stable/well controlled - Continue current medication regimen. Last Rx for lorazepam (#60) has lasted her 6 months so she is using this more sparingly than previously.  I checked the [place] Prescription Monitoring Program website which showed no concerning activity.     - FLUoxetine (PROZAC) 20 MG capsule  Dispense: 180 capsule; Refill: 3  - LORazepam (ATIVAN) 0.5 MG tablet  Dispense: 60 tablet; Refill: 0    Need for vaccination  - TDAP VACCINE (Adacel, Boostrix)         COUNSELING:  Reviewed preventive health counseling, as reflected in patient instructions    Estimated body mass index is 34.14 kg/m  as calculated from the following:    Height as of this encounter: 1.655 m (5' 5.16\").    Weight as of this encounter: 93.5 kg (206 lb 2 oz).  Wt Readings from Last 3 Encounters:   09/13/22 93.5 kg (206 lb 2 oz)   06/09/22 94.3 kg (207 lb 14.4 oz)   04/21/22 90.7 kg (200 lb)    Weight management plan: referred to weight management clinic    She reports that she has never smoked. She has never used " smokeless tobacco.      Counseling Resources:  ATP IV Guidelines  Pooled Cohorts Equation Calculator  Breast Cancer Risk Calculator  BRCA-Related Cancer Risk Assessment: FHS-7 Tool  FRAX Risk Assessment  ICSI Preventive Guidelines  Dietary Guidelines for Americans, 2010  USDA's MyPlate  ASA Prophylaxis  Lung CA Screening    Doreen Aguila MD  [facility]

## 2022-09-16 NOTE — RESULT ENCOUNTER NOTE
Twan Franco,  Your fasting blood sugar is slightly elevated.  The rest of your basic metabolic panel results (blood salts and kidney function) are normal.  Your TSH (thyroid function test) is normal and your lipid panel (cholesterol) results are quite stable.      For the elevated fasting glucose I recommend that you try cutting back on your carbohydrate intake:  Eat less sweets, bread, pasta, rice, and potatoes and more fruit, vegetables, and lean protein.  Increased exercise (especially brief walks after eating) and weight loss (even small amounts of weight loss) are also beneficial.    Doreen Aguila MD

## 2023-01-25 ENCOUNTER — LAB (OUTPATIENT)
Dept: LAB | Facility: CLINIC | Age: 58
End: 2023-01-25
Payer: COMMERCIAL

## 2023-01-25 ENCOUNTER — OFFICE VISIT (OUTPATIENT)
Dept: ENDOCRINOLOGY | Facility: CLINIC | Age: 58
End: 2023-01-25
Payer: COMMERCIAL

## 2023-01-25 VITALS
HEART RATE: 74 BPM | BODY MASS INDEX: 32.13 KG/M2 | DIASTOLIC BLOOD PRESSURE: 88 MMHG | SYSTOLIC BLOOD PRESSURE: 150 MMHG | WEIGHT: 194 LBS

## 2023-01-25 DIAGNOSIS — E55.9 VITAMIN D DEFICIENCY: ICD-10-CM

## 2023-01-25 DIAGNOSIS — D35.2 PROLACTINOMA (H): ICD-10-CM

## 2023-01-25 DIAGNOSIS — D35.2 PROLACTINOMA (H): Primary | ICD-10-CM

## 2023-01-25 LAB
DEPRECATED CALCIDIOL+CALCIFEROL SERPL-MC: 64 UG/L (ref 20–75)
PROLACTIN SERPL 3RD IS-MCNC: 29 NG/ML (ref 5–23)

## 2023-01-25 PROCEDURE — 36415 COLL VENOUS BLD VENIPUNCTURE: CPT | Performed by: PATHOLOGY

## 2023-01-25 PROCEDURE — 99214 OFFICE O/P EST MOD 30 MIN: CPT | Performed by: INTERNAL MEDICINE

## 2023-01-25 PROCEDURE — 82306 VITAMIN D 25 HYDROXY: CPT | Performed by: INTERNAL MEDICINE

## 2023-01-25 PROCEDURE — 84146 ASSAY OF PROLACTIN: CPT | Performed by: INTERNAL MEDICINE

## 2023-01-25 RX ORDER — DOXYCYCLINE HYCLATE 50 MG/1
CAPSULE ORAL
COMMUNITY
Start: 2023-01-16 | End: 2023-06-13

## 2023-01-25 ASSESSMENT — PAIN SCALES - GENERAL: PAINLEVEL: NO PAIN (0)

## 2023-01-25 NOTE — NURSING NOTE
"Chief Complaint   Patient presents with     Endocrine Problem     Vital signs:      BP: (!) 150/88 Pulse: 74             Weight: 88 kg (194 lb)  Estimated body mass index is 32.13 kg/m  as calculated from the following:    Height as of 9/13/22: 1.655 m (5' 5.16\").    Weight as of this encounter: 88 kg (194 lb).        "

## 2023-01-25 NOTE — PROGRESS NOTES
Endocrinology follow up visit      Chief complaint:  Joan is a 57 year old female seen in consultation for follow up of prolactinoma.     INTERVAL HISTORY  Last seen in 02/2020. Has done well in the interim, denies any new symptoms. Is remodeling her house, does have sinus congestion related headaches at night at times due to dust from vents.   Brain MRI had been ordered, not completed due to felling very well.   Denies any galactorrhea, breast tenderness or other new symptoms.   TSH (1.0), FT4 (1.08) were within normal limits in 2020. In 05/2021 TSH was 0.75. Continues on  levothyroxine 100 mcg daily. Clinically euthyroid.   Prolactin level 67 (04/2018) >> 44 (02/2020).       ASSESSMENT/PLAN:     Prolactinoma  Diagnosed at age 40 - has been monitored over time but never received medical treatment. In April 2018, prolactin level was elevated to 67; decreased to 44 in 2020 (last seen in 2020 in this clinic). She was clinically asymptomatic, therefore had opted to forego the Brain MRI which is reasonable.  She is postmenopausal; expect prolactin level to further decline naturally; no indication for treatment. We will reassess a prolactin level, if this is increasing can proceed with an MRI, otherwise can forego this and follow up in one year.     - PRL check today, if stable, then extend follow up to 2 years     - no need images    Hypothyroidism- remains on levothyroxine 100 mcg daily chronically. Clinically euthyroid. Last TSH normal, no indication for change in dosage.     Others  Check vitamin D level today    30  minutes spent on the date of the encounter doing chart review, history and exam, documentation and further activities as noted above.    Shaunna Townsend MD  Staff Physician  Endocrinology and Metabolism  [facility]  License: [id number]  Pager: [phone]    Interval History as of 1/25/2023 : Patient has been doing well. Menopause for the past 17 months with mild occasional sweating  but not much affecting her quality of life.  HPI: Joan Lam is a 56 year old female who is here for follow-up on her prolactinoma. PMHx also includes NATALIE, depression, HTN, simple endometrial hyperplasia, and hypothyroidism.   She has remote history of a micro-prolactinoma diagnosed at age 40 (2005), at which time she had galactorrhea and amenorrhea. Her prolactin level was in the 60s-70s range, and a brain MRI showed microadenoma per her report. She did not have any medical treatment at the time, and prolactin level spontaneously decreased. She stopped seeing her endocrinologist at the time.  MRI Report from 2008 reveals two small microadenomas ~4mm in size at anterosuperior and inferior aspects of pituitary (OSH scan).   She did continue to follow with her PCP who was tracking prolactin level yearly. In 2015 prolactin was 42, then in November 2017 increased to 96.1, along with breast tenderness (no galactorrhea).      She came to our clinic for evaluation on 4/10/2018, at which time she reported breast tenderness. Prolactin level was elevated to 67 at that time. She was prescribed cabergoline 0.25 mg once a week and brain MRI was ordered, but due to job and insurance changes she did not end up getting the MRI or cabergoline.      She also has been taking progesterone -300 mg daily for hot flashes, which she started in 2010. However she wasn't taking it as regularly as she was prescribed because she was in between jobs/insurance. She notes that she hadn't had her period [id number], then in 2018 started having symptoms (cramps), and then started having vaginal bleeding again. Progesterone sporadically due to her insurance issues. An endometrial biopsy and ultrasound was done in 2018, and she was diagnosed with simple endometrial hyperplasia. She is still taking progesterone regularly 200 mg daily. Now she has regular periods.    REVIEW OF SYSTEMS    10 system ROS otherwise as per the HPI or  negative    Past Medical History  Past Medical History:   Diagnosis Date     Allergic rhinitis 08/11/2005     Anxiety disorder     Has Panic Attacks     Cervical high risk HPV (human papillomavirus) test positive 01/23/2019 01/23/2019, 02/05/20, 03/3/21     Depressive disorder 1992    and NATALIE. Taking Fluoxetine (40mg)     History of prolactinoma 2005    Dr. Fischer, [facility]     Hypertension 12/2016     Hypothyroidism 2014       Medications  Current Outpatient Medications   Medication     amLODIPine (NORVASC) 5 MG tablet     cetirizine (ZYRTEC) 10 MG tablet     cholecalciferol (VITAMIN D3) 1000 UNIT tablet     desonide (DESOWEN) 0.05 % external ointment     doxycycline hyclate (VIBRAMYCIN) 50 MG capsule     FLUoxetine (PROZAC) 20 MG capsule     fluticasone (FLONASE) 50 MCG/ACT nasal spray     levothyroxine (SYNTHROID/LEVOTHROID) 100 MCG tablet     LORazepam (ATIVAN) 0.5 MG tablet     omeprazole (PRILOSEC) 20 MG DR capsule     progesterone (PROMETRIUM) 100 MG capsule     VALERIAN ROOT PO     No current facility-administered medications for this visit.       Current Outpatient Medications   Medication Sig Dispense Refill     amLODIPine (NORVASC) 5 MG tablet TAKE 1 TABLET BY MOUTH  DAILY 90 tablet 3     cetirizine (ZYRTEC) 10 MG tablet Take 10 mg by mouth daily       cholecalciferol (VITAMIN D3) 1000 UNIT tablet Take 3 tablets (3,000 Units) by mouth daily       desonide (DESOWEN) 0.05 % external ointment Apply topically 2 times daily as needed (to affected area) up to one week at a time. 15 g 1     doxycycline hyclate (VIBRAMYCIN) 50 MG capsule        FLUoxetine (PROZAC) 20 MG capsule TAKE 2 CAPSULES BY MOUTH  DAILY 180 capsule 3     fluticasone (FLONASE) 50 MCG/ACT nasal spray Spray 2 sprays into both nostrils daily       levothyroxine (SYNTHROID/LEVOTHROID) 100 MCG tablet TAKE 1 TABLET BY MOUTH  DAILY 90 tablet 3     LORazepam (ATIVAN) 0.5 MG tablet TAKE 1 TABLET(0.5 MG) BY MOUTH DAILY AS NEEDED FOR  ANXIETY 60 tablet 0     omeprazole (PRILOSEC) 20 MG DR capsule Take 20 mg by mouth daily       progesterone (PROMETRIUM) 100 MG capsule Take 2 capsules (200 mg) by mouth daily 180 capsule 3     VALERIAN ROOT PO Take 1,000 mg by mouth         Allergies  Allergies   Allergen Reactions     No Known Drug Allergies          Family History  family history includes Anxiety Disorder in her paternal grandmother; Breast Cancer in her maternal aunt, maternal grandmother, and mother; Cerebrovascular Disease in her maternal grandmother; Colon Cancer in her maternal grandmother; Depression in her paternal grandfather; Hypertension in her father; Substance Abuse in her brother and maternal grandfather.    Social History    Social History     Tobacco Use     Smoking status: Never     Smokeless tobacco: Never   Substance Use Topics     Alcohol use: Yes     Comment: once a month-2 drinks each time     Drug use: No       Physical Exam  GENERAL :  In no apparent distress  SKIN: visible skin clear, no rashes  EYES: No scleral icterus,  No proptosis, conjunctival redness, stare, retraction  NECK: No visible masses.  RESP: No audible cough or increased work of breathing.  NEURO: awake, alert, responds appropriately to questions.     DATA REVIEW  ENDO THYROID LABS-[facility] Latest Ref Rng & Units 5/25/2021 2/5/2020   TSH 0.40 - 4.00 mU/L 0.75 1.00   FREE T4 0.76 - 1.46 ng/dL  1.08     ENDO PITUITARY LABS-[facility] Latest Ref Rng & Units 5/25/2021 2/5/2020   GLUCOSE 70 - 99 mg/dL 92    INS GROWTH FACTOR 1 55 - 235 ng/ml     POTASSIUM 3.4 - 5.3 mmol/L 3.6    PROLACTIN 3 - 27 ug/L  44 (H)   SODIUM 133 - 144 mmol/L 140    TSH 0.40 - 4.00 mU/L 0.75 1.00   T4 FREE 0.76 - 1.46 ng/dL  1.08     ENDO PITUITARY LABS-[facility] Latest Ref Rng & Units 10/21/2019 6/7/2019   GLUCOSE 70 - 99 mg/dL 98 87   INS GROWTH FACTOR 1 55 - 235 ng/ml     POTASSIUM 3.4 - 5.3 mmol/L 4.0 4.0   PROLACTIN 3 - 27 ug/L     SODIUM 133 - 144 mmol/L 138 141   TSH 0.40 - 4.00 mU/L 1.15 0.98   T4  FREE 0.76 - 1.46 ng/dL       ENDO PITUITARY LABS-[facility] Latest Ref Rng & Units 4/10/2018   GLUCOSE 70 - 99 mg/dL 89   INS GROWTH FACTOR 1 55 - 235 ng/ml 102   POTASSIUM 3.4 - 5.3 mmol/L 3.4   PROLACTIN 3 - 27 ug/L 67 (H)   SODIUM 133 - 144 mmol/L 138   TSH 0.40 - 4.00 mU/L 0.97   T4 FREE 0.76 - 1.46 ng/dL 1.10

## 2023-01-25 NOTE — LETTER
1/25/2023       RE: Joan Lam  [address] [facility] [address]     Dear Colleague,    Thank you for referring your patient, Joan Lam, to the [facility] ENDOCRINOLOGY [facility] [place] at [facility]. Please see a copy of my visit note below.      Endocrinology follow up visit      Chief complaint:  Joan is a 57 year old female seen in consultation for follow up of prolactinoma.     INTERVAL HISTORY  Last seen in 02/2020. Has done well in the interim, denies any new symptoms. Is remodeling her house, does have sinus congestion related headaches at night at times due to dust from vents.   Brain MRI had been ordered, not completed due to felling very well.   Denies any galactorrhea, breast tenderness or other new symptoms.   TSH (1.0), FT4 (1.08) were within normal limits in 2020. In 05/2021 TSH was 0.75. Continues on  levothyroxine 100 mcg daily. Clinically euthyroid.   Prolactin level 67 (04/2018) >> 44 (02/2020).       ASSESSMENT/PLAN:     Prolactinoma  Diagnosed at age 40 - has been monitored over time but never received medical treatment. In April 2018, prolactin level was elevated to 67; decreased to 44 in 2020 (last seen in 2020 in this clinic). She was clinically asymptomatic, therefore had opted to forego the Brain MRI which is reasonable.  She is postmenopausal; expect prolactin level to further decline naturally; no indication for treatment. We will reassess a prolactin level, if this is increasing can proceed with an MRI, otherwise can forego this and follow up in one year.     - PRL check today, if stable, then extend follow up to 2 years     - no need images    Hypothyroidism- remains on levothyroxine 100 mcg daily chronically. Clinically euthyroid. Last TSH normal, no indication for change in dosage.     Others  Check vitamin D level today    30  minutes spent on the date of the encounter doing chart review, history and  exam, documentation and further activities as noted above.    Shaunna Townsend MD  Staff Physician  Endocrinology and Metabolism  [facility]  License: [id number]  Pager: [phone]    Interval History as of 1/25/2023 : Patient has been doing well. Menopause for the past 17 months with mild occasional sweating but not much affecting her quality of life.  HPI: Joan Lam is a 56 year old female who is here for follow-up on her prolactinoma. PMHx also includes NATLAIE, depression, HTN, simple endometrial hyperplasia, and hypothyroidism.   She has remote history of a micro-prolactinoma diagnosed at age 40 (2005), at which time she had galactorrhea and amenorrhea. Her prolactin level was in the 60s-70s range, and a brain MRI showed microadenoma per her report. She did not have any medical treatment at the time, and prolactin level spontaneously decreased. She stopped seeing her endocrinologist at the time.  MRI Report from 2008 reveals two small microadenomas ~4mm in size at anterosuperior and inferior aspects of pituitary (OSH scan).   She did continue to follow with her PCP who was tracking prolactin level yearly. In 2015 prolactin was 42, then in November 2017 increased to 96.1, along with breast tenderness (no galactorrhea).      She came to our clinic for evaluation on 4/10/2018, at which time she reported breast tenderness. Prolactin level was elevated to 67 at that time. She was prescribed cabergoline 0.25 mg once a week and brain MRI was ordered, but due to job and insurance changes she did not end up getting the MRI or cabergoline.      She also has been taking progesterone -300 mg daily for hot flashes, which she started in 2010. However she wasn't taking it as regularly as she was prescribed because she was in between jobs/insurance. She notes that she hadn't had her period [id number], then in 2018 started having symptoms (cramps), and then started having vaginal bleeding again.  Progesterone sporadically due to her insurance issues. An endometrial biopsy and ultrasound was done in 2018, and she was diagnosed with simple endometrial hyperplasia. She is still taking progesterone regularly 200 mg daily. Now she has regular periods.    REVIEW OF SYSTEMS    10 system ROS otherwise as per the HPI or negative    Past Medical History  Past Medical History:   Diagnosis Date     Allergic rhinitis 08/11/2005     Anxiety disorder     Has Panic Attacks     Cervical high risk HPV (human papillomavirus) test positive 01/23/2019 01/23/2019, 02/05/20, 03/3/21     Depressive disorder 1992    and NATALIE. Taking Fluoxetine (40mg)     History of prolactinoma 2005    Dr. Fischer, [facility]""     Hypertension 12/2016     Hypothyroidism 2014       Medications  Current Outpatient Medications   Medication     amLODIPine (NORVASC) 5 MG tablet     cetirizine (ZYRTEC) 10 MG tablet     cholecalciferol (VITAMIN D3) 1000 UNIT tablet     desonide (DESOWEN) 0.05 % external ointment     doxycycline hyclate (VIBRAMYCIN) 50 MG capsule     FLUoxetine (PROZAC) 20 MG capsule     fluticasone (FLONASE) 50 MCG/ACT nasal spray     levothyroxine (SYNTHROID/LEVOTHROID) 100 MCG tablet     LORazepam (ATIVAN) 0.5 MG tablet     omeprazole (PRILOSEC) 20 MG DR capsule     progesterone (PROMETRIUM) 100 MG capsule     VALERIAN ROOT PO     No current facility-administered medications for this visit.       Current Outpatient Medications   Medication Sig Dispense Refill     amLODIPine (NORVASC) 5 MG tablet TAKE 1 TABLET BY MOUTH  DAILY 90 tablet 3     cetirizine (ZYRTEC) 10 MG tablet Take 10 mg by mouth daily       cholecalciferol (VITAMIN D3) 1000 UNIT tablet Take 3 tablets (3,000 Units) by mouth daily       desonide (DESOWEN) 0.05 % external ointment Apply topically 2 times daily as needed (to affected area) up to one week at a time. 15 g 1     doxycycline hyclate (VIBRAMYCIN) 50 MG capsule        FLUoxetine (PROZAC) 20 MG capsule TAKE  2 CAPSULES BY MOUTH  DAILY 180 capsule 3     fluticasone (FLONASE) 50 MCG/ACT nasal spray Spray 2 sprays into both nostrils daily       levothyroxine (SYNTHROID/LEVOTHROID) 100 MCG tablet TAKE 1 TABLET BY MOUTH  DAILY 90 tablet 3     LORazepam (ATIVAN) 0.5 MG tablet TAKE 1 TABLET(0.5 MG) BY MOUTH DAILY AS NEEDED FOR ANXIETY 60 tablet 0     omeprazole (PRILOSEC) 20 MG DR capsule Take 20 mg by mouth daily       progesterone (PROMETRIUM) 100 MG capsule Take 2 capsules (200 mg) by mouth daily 180 capsule 3     VALERIAN ROOT PO Take 1,000 mg by mouth         Allergies  Allergies   Allergen Reactions     No Known Drug Allergies          Family History  family history includes Anxiety Disorder in her paternal grandmother; Breast Cancer in her maternal aunt, maternal grandmother, and mother; Cerebrovascular Disease in her maternal grandmother; Colon Cancer in her maternal grandmother; Depression in her paternal grandfather; Hypertension in her father; Substance Abuse in her brother and maternal grandfather.    Social History    Social History     Tobacco Use     Smoking status: Never     Smokeless tobacco: Never   Substance Use Topics     Alcohol use: Yes     Comment: once a month-2 drinks each time     Drug use: No       Physical Exam  GENERAL :  In no apparent distress  SKIN: visible skin clear, no rashes  EYES: No scleral icterus,  No proptosis, conjunctival redness, stare, retraction  NECK: No visible masses.  RESP: No audible cough or increased work of breathing.  NEURO: awake, alert, responds appropriately to questions.     DATA REVIEW  ENDO THYROID LABS-[facility] Latest Ref Rng & Units 5/25/2021 2/5/2020   TSH 0.40 - 4.00 mU/L 0.75 1.00   FREE T4 0.76 - 1.46 ng/dL  1.08     ENDO PITUITARY LABS-[facility] Latest Ref Rng & Units 5/25/2021 2/5/2020   GLUCOSE 70 - 99 mg/dL 92    INS GROWTH FACTOR 1 55 - 235 ng/ml     POTASSIUM 3.4 - 5.3 mmol/L 3.6    PROLACTIN 3 - 27 ug/L  44 (H)   SODIUM 133 - 144 mmol/L 140    TSH 0.40 - 4.00 mU/L  0.75 1.00   T4 FREE 0.76 - 1.46 ng/dL  1.08     ENDO PITUITARY LABS-[facility] Latest Ref Rng & Units 10/21/2019 6/7/2019   GLUCOSE 70 - 99 mg/dL 98 87   INS GROWTH FACTOR 1 55 - 235 ng/ml     POTASSIUM 3.4 - 5.3 mmol/L 4.0 4.0   PROLACTIN 3 - 27 ug/L     SODIUM 133 - 144 mmol/L 138 141   TSH 0.40 - 4.00 mU/L 1.15 0.98   T4 FREE 0.76 - 1.46 ng/dL       ENDO PITUITARY LABS-[facility] Latest Ref Rng & Units 4/10/2018   GLUCOSE 70 - 99 mg/dL 89   INS GROWTH FACTOR 1 55 - 235 ng/ml 102   POTASSIUM 3.4 - 5.3 mmol/L 3.4   PROLACTIN 3 - 27 ug/L 67 (H)   SODIUM 133 - 144 mmol/L 138   TSH 0.40 - 4.00 mU/L 0.97   T4 FREE 0.76 - 1.46 ng/dL 1.10         Shaunna Townsend MD

## 2023-02-09 ENCOUNTER — TELEPHONE (OUTPATIENT)
Dept: ENDOCRINOLOGY | Facility: CLINIC | Age: 58
End: 2023-02-09
Payer: COMMERCIAL

## 2023-02-09 ENCOUNTER — TELEPHONE (OUTPATIENT)
Dept: ENDOCRINOLOGY | Facility: CLINIC | Age: 58
End: 2023-02-09

## 2023-02-09 NOTE — TELEPHONE ENCOUNTER
Spoke to pt over the phone 2/9/23 to schedule 1/yr follow up per Dr. Townsend 1/25/23 checkout orders.    Dr. Townsend: Pt was not sure if you wanted to schedule a 1 year follow up or if you would prefer a 2 year follow up. Pt asked that I confirm with you. Please advise when able.

## 2023-02-10 NOTE — TELEPHONE ENCOUNTER
LV 2/10/2023 notifying pt that Dr. Townsend would like pt to do a 2 year f/up. Also notified pt that Dr. Townsend's template is not open at this time and we will call her back to schedule once Dr. Townsend's template opens for scheduling that far in advance. Routing back for later.

## 2023-03-21 DIAGNOSIS — N85.01 ENDOMETRIAL HYPERPLASIA, SIMPLE: ICD-10-CM

## 2023-03-21 RX ORDER — PROGESTERONE 100 MG/1
200 CAPSULE ORAL DAILY
Qty: 180 CAPSULE | Refills: 1 | Status: SHIPPED | OUTPATIENT
Start: 2023-03-21 | End: 2023-06-29

## 2023-03-23 ENCOUNTER — ANCILLARY PROCEDURE (OUTPATIENT)
Dept: MAMMOGRAPHY | Facility: CLINIC | Age: 58
End: 2023-03-23
Attending: FAMILY MEDICINE
Payer: COMMERCIAL

## 2023-03-23 DIAGNOSIS — Z12.31 VISIT FOR SCREENING MAMMOGRAM: ICD-10-CM

## 2023-03-23 PROCEDURE — 77067 SCR MAMMO BI INCL CAD: CPT

## 2023-03-23 PROCEDURE — 77067 SCR MAMMO BI INCL CAD: CPT | Mod: 26 | Performed by: RADIOLOGY

## 2023-05-08 ENCOUNTER — E-VISIT (OUTPATIENT)
Dept: URGENT CARE | Facility: CLINIC | Age: 58
End: 2023-05-08
Payer: COMMERCIAL

## 2023-05-08 DIAGNOSIS — J06.9 VIRAL URI WITH COUGH: Primary | ICD-10-CM

## 2023-05-08 PROCEDURE — 99421 OL DIG E/M SVC 5-10 MIN: CPT | Performed by: PHYSICIAN ASSISTANT

## 2023-05-08 NOTE — PATIENT INSTRUCTIONS
Dear Joan Lam    I'm sorry to hear you are not feeling well!   I recommend Mucinex D for congestion, Robitussin DM for cough, and ibuprofen or tylenol for pain/fever/headache/sore throat to treat your symptoms.  Make sure you are using medications like these to help your symptoms. Your immune system is hard at work trying to get you better. The average virus lasts about 7-10 days.  Hope you feel better soon!      Thanks for choosing us as your health care partner,    Debbie Castro PA-C, PABernabeC

## 2023-05-25 ENCOUNTER — PATIENT OUTREACH (OUTPATIENT)
Dept: FAMILY MEDICINE | Facility: CLINIC | Age: 58
End: 2023-05-25
Payer: COMMERCIAL

## 2023-05-25 DIAGNOSIS — R87.810 CERVICAL HIGH RISK HPV (HUMAN PAPILLOMAVIRUS) TEST POSITIVE: ICD-10-CM

## 2023-06-29 ENCOUNTER — OFFICE VISIT (OUTPATIENT)
Dept: OBGYN | Facility: CLINIC | Age: 58
End: 2023-06-29
Attending: NURSE PRACTITIONER
Payer: COMMERCIAL

## 2023-06-29 VITALS
HEIGHT: 65 IN | BODY MASS INDEX: 32.47 KG/M2 | SYSTOLIC BLOOD PRESSURE: 133 MMHG | WEIGHT: 194.9 LBS | DIASTOLIC BLOOD PRESSURE: 78 MMHG | HEART RATE: 68 BPM

## 2023-06-29 DIAGNOSIS — Z80.3 FAMILY HISTORY OF MALIGNANT NEOPLASM OF BREAST: ICD-10-CM

## 2023-06-29 DIAGNOSIS — N85.01 ENDOMETRIAL HYPERPLASIA, SIMPLE: ICD-10-CM

## 2023-06-29 DIAGNOSIS — Z12.4 SCREENING FOR CERVICAL CANCER: ICD-10-CM

## 2023-06-29 DIAGNOSIS — Z86.19 HISTORY OF HPV INFECTION: ICD-10-CM

## 2023-06-29 DIAGNOSIS — Z87.440 PERSONAL HISTORY OF URINARY TRACT INFECTION: ICD-10-CM

## 2023-06-29 DIAGNOSIS — Z01.419 ENCOUNTER FOR GYNECOLOGICAL EXAMINATION WITHOUT ABNORMAL FINDING: Primary | ICD-10-CM

## 2023-06-29 LAB
ALBUMIN UR-MCNC: 30 MG/DL
APPEARANCE UR: CLEAR
BILIRUB UR QL STRIP: ABNORMAL
COLOR UR AUTO: YELLOW
GLUCOSE UR STRIP-MCNC: NEGATIVE MG/DL
HGB UR QL STRIP: NEGATIVE
KETONES UR STRIP-MCNC: NEGATIVE MG/DL
LEUKOCYTE ESTERASE UR QL STRIP: NEGATIVE
NITRATE UR QL: NEGATIVE
PH UR STRIP: 6 [PH] (ref 5–8)
SP GR UR STRIP: 1.02 (ref 1–1.03)
UROBILINOGEN UR STRIP-ACNC: 0.2 E.U./DL

## 2023-06-29 PROCEDURE — G0145 SCR C/V CYTO,THINLAYER,RESCR: HCPCS | Performed by: NURSE PRACTITIONER

## 2023-06-29 PROCEDURE — G0463 HOSPITAL OUTPT CLINIC VISIT: HCPCS | Mod: 25 | Performed by: NURSE PRACTITIONER

## 2023-06-29 PROCEDURE — 87624 HPV HI-RISK TYP POOLED RSLT: CPT | Performed by: NURSE PRACTITIONER

## 2023-06-29 PROCEDURE — 81003 URINALYSIS AUTO W/O SCOPE: CPT | Performed by: NURSE PRACTITIONER

## 2023-06-29 PROCEDURE — G0101 CA SCREEN;PELVIC/BREAST EXAM: HCPCS | Performed by: NURSE PRACTITIONER

## 2023-06-29 RX ORDER — PROGESTERONE 100 MG/1
200 CAPSULE ORAL DAILY
Qty: 180 CAPSULE | Refills: 1 | Status: SHIPPED | OUTPATIENT
Start: 2023-06-29 | End: 2023-06-29

## 2023-06-29 RX ORDER — PROGESTERONE 100 MG/1
200 CAPSULE ORAL DAILY
Qty: 180 CAPSULE | Refills: 1 | Status: SHIPPED | OUTPATIENT
Start: 2023-06-29 | End: 2024-01-17

## 2023-06-29 RX ORDER — BUPROPION HYDROCHLORIDE 150 MG/1
TABLET ORAL
COMMUNITY
Start: 2023-04-21 | End: 2023-11-21 | Stop reason: DRUGHIGH

## 2023-06-29 ASSESSMENT — ANXIETY QUESTIONNAIRES
1. FEELING NERVOUS, ANXIOUS, OR ON EDGE: MORE THAN HALF THE DAYS
GAD7 TOTAL SCORE: 3
6. BECOMING EASILY ANNOYED OR IRRITABLE: NOT AT ALL
2. NOT BEING ABLE TO STOP OR CONTROL WORRYING: NOT AT ALL
GAD7 TOTAL SCORE: 3
5. BEING SO RESTLESS THAT IT IS HARD TO SIT STILL: NOT AT ALL
7. FEELING AFRAID AS IF SOMETHING AWFUL MIGHT HAPPEN: NOT AT ALL
3. WORRYING TOO MUCH ABOUT DIFFERENT THINGS: NOT AT ALL

## 2023-06-29 ASSESSMENT — PATIENT HEALTH QUESTIONNAIRE - PHQ9
5. POOR APPETITE OR OVEREATING: SEVERAL DAYS
SUM OF ALL RESPONSES TO PHQ QUESTIONS 1-9: 1

## 2023-06-29 NOTE — PROGRESS NOTES
"Progress Note    SUBJECTIVE:  Joan Lam is an 58 year old, , who requests a breast and pelvic exam.  Patient is followed by Dr. Aguila for primary care.      Concerns today include:    1. Was treated for a UTI- 2023. She is having some mild bladder discomfort, however, all of her other symptoms have resolved. She recalls this lingering after her last UTI as well, and then spontaneously resolved. Last UTI was several yrs ago.     2. Hx of simple endometrial hyperplasia without atypia: First diagnosed 2018 (perimenopausal). Biopsy 2019 was normal; repeat biopsy on 3/11/2020 showed simple endometrial hyperplasia without atypia, again.  She had an EMB 3/3/2021 which was normal. She continues to take 200mg Prometrium daily, which she started before  - originally prescribed to help improve \"sleep and well-being\". No SE from this medication and she desires to stay on it.     3. Family history of breast and colon cancer: Her mother was diagnosed with breast cancer in her 70s and her maternal aunt had breast cancer diagnosed in her late 50's/ early 60's. Of note, patient does not have any sisters and has not had any children. Menarche at age 16.  Nobody has had genetic testing that she is aware of.  Pt has not had genetic counseling. Getting mammograms yearly. No hx of abnormal results.      4. Hx of HPV pos pap smear, due for pap smear today based on hx below:  19: NIL pap, + HR HPV (not 16 or 18) result, EMB neg for dysplasia. Plan cotest in 1 year.   20: NIL pap, + HR HPV (not 16 or 18) result. Plan Offerle.  3/11/20 colp bx and ECC - no dysplasia. Plan: Would advise Mirena IUD, will likely plan repeat endometrial biopsy at the time of placement.    03/3/21 EMB Neg, NIL Pap, + HR HPV (not 16 or 18) Plan 1 yr co-test.  22 NIL, +HR HPV, not 16/18. Plan Offerle bef 22 Offerle: Benign. Plan 1 yr co-test.   Due for a pap with co-test today.        Sexual hx: Is sexually " active with one partner. Denies any risk for STD exposure. Having vaginal dryness, which she is using lubricants for. Otherwise no sexual health concerns.     Last mammogram: 3/2023 normal.     Preventive health visit with PCP 9/2023 - Labs done at that time    Menstrual History:      7/10/2019     8:20 AM 10/21/2019    10:20 AM 11/11/2019     1:40 PM 3/3/2021    10:30 AM 5/25/2021     7:40 AM   Menstrual History   LAST MENSTRUAL PERIOD 7/5/2019 7/10/2019 10/21/2019 2/17/2021 2/21/2021       HISTORY:  Prescription Medications as of 6/29/2023       Rx Number Disp Refills Start End Last Dispensed Date Next Fill Date Owning Pharmacy    amLODIPine (NORVASC) 5 MG tablet  90 tablet 3 10/28/2022    [facility] - [place], [place] - [address]    Sig: TAKE 1 TABLET BY MOUTH  DAILY    Class: E-Prescribe    Notes to Pharmacy: Requesting 1 year supply    buPROPion (WELLBUTRIN XL) 150 MG 24 hr tablet    4/21/2023        Class: Historical    cetirizine (ZYRTEC) 10 MG tablet            Sig: Take 10 mg by mouth daily    Class: Historical    Route: Oral    cholecalciferol (VITAMIN D3) 1000 UNIT tablet    12/13/2017    [id number] - [place], [place] - [address]    Sig: Take 3 tablets (3,000 Units) by mouth daily    Class: Historical    Route: Oral    desonide (DESOWEN) 0.05 % external ointment  15 g 1 8/11/2022    [facility] - [place], [place] - [address] [place] [address]    Sig: Apply topically 2 times daily as needed (to affected area) up to one week at a time.    Class: E-Prescribe    Route: Topical    FLUoxetine (PROZAC) 20 MG capsule  180 capsule 3 10/7/2022    [facility] - [place], [place] - [address]    Sig: TAKE 2 CAPSULES BY MOUTH  DAILY    Class: E-Prescribe    Notes to Pharmacy: Requesting 1 year supply    fluticasone (FLONASE) 50 MCG/ACT nasal spray            Sig: Spray 2 sprays into  both nostrils daily    Class: Historical    Route: Both Nostrils    levothyroxine (SYNTHROID/LEVOTHROID) 100 MCG tablet  90 tablet 3 2022    [facility] - [place], [place] - [address]    Sig: TAKE 1 TABLET BY MOUTH  DAILY    Class: E-Prescribe    Notes to Pharmacy: Requesting 1 year supply    LORazepam (ATIVAN) 0.5 MG tablet  60 tablet 0 2022    [id number] - [place], [place] - [address] AT [facility]    Sig: TAKE 1 TABLET(0.5 MG) BY MOUTH DAILY AS NEEDED FOR ANXIETY    Class: E-Prescribe    progesterone (PROMETRIUM) 100 MG capsule  180 capsule 1 2023    [id number] - [place], [place] - [address] AT [facility]    Sig: Take 2 capsules (200 mg) by mouth daily    Class: E-Prescribe    Route: Oral    VALERIAN ROOT PO        [facility] - [place], [place] - [address]    Sig: Take 1,000 mg by mouth    Class: Historical    Route: Oral        Allergies   Allergen Reactions     No Known Drug Allergy      Immunization History   Administered Date(s) Administered     COVID-19 Bivalent 18+ (Moderna) 2022     COVID-19 MONOVALENT 12+ (Pfizer) 2021, 2021, 2021     COVID-19 Monovalent 12+ (Pfizer ) 2022     FLU 6-35 months 2009, 10/22/2016     Influenza (IIV3) PF 2010, 2011, 10/18/2012, 2013, 2013     Influenza Vaccine >6 months (Alfuria,Fluzone) 2014, 2016, 10/27/2017, 2018, 10/11/2019, 10/31/2020, 10/18/2021, 2022     TD,PF 7+ (Tenivac) 2005     TDAP (Adacel,Boostrix) 2022     TDAP Vaccine (Adacel) 2012     Zoster recombinant adjuvanted (SHINGRIX) 10/18/2021, 2022       OB History    Para Term  AB Living   0 0 0 0 0 0   SAB IAB Ectopic Multiple Live Births   0 0 0 0 0     Past Medical History:   Diagnosis Date     Allergic rhinitis 2005     Anxiety disorder      Has Panic Attacks     Cervical high risk HPV (human papillomavirus) test positive 01/23/2019 01/23/2019, 02/05/20, 03/3/21     Depressive disorder 1992    and NATALIE. Taking Fluoxetine (40mg)     History of prolactinoma 2005    Dr. Fischer, [facility]     Hypertension 12/2016     Hypothyroidism 2014     Past Surgical History:   Procedure Laterality Date     BIOPSY  mid-90's    from cervix after abnormal Pap. Benign.     BUNIONECTOMY RT/LT  2005    Right Foot     BUNIONECTOMY RT/LT  2006    Left Foot      COLONOSCOPY  January 15, 2018    1 polyp - benign. Next exam in 10 years.     Family History   Problem Relation Age of Onset     Breast Cancer Mother         Cancer-free for 2 years     Hypertension Father      Cerebrovascular Disease Maternal Grandmother      Colon Cancer Maternal Grandmother 67     Substance Abuse Maternal Grandfather      Anxiety Disorder Paternal Grandmother      Depression Paternal Grandfather      Substance Abuse Brother      Breast Cancer Maternal Aunt 60        Mat. Aunt - cancer free now     Social History     Socioeconomic History     Marital status: Single     Spouse name: Single     Number of children: 0     Years of education: None     Highest education level: None   Occupational History     Occupation:      Employer: [employer]   Tobacco Use     Smoking status: Never     Smokeless tobacco: Never   Substance and Sexual Activity     Alcohol use: Yes     Comment: once a month-2 drinks each time     Drug use: No     Sexual activity: Not Currently     Partners: Male   Other Topics Concern     Parent/sibling w/ CABG, MI or angioplasty before 65F 55M? 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 Body mass index is 32.27 kg/m .  General: pleasant female in no acute distress  Psych: normal mentation, well oriented  Respiratory:  Unlabored breathing  Musculoskeletal: no gross deformities    BREAST EXAM:  Breast: Without visible skin changes. No dimpling or lesions seen.   Breasts supple, non-tender with palpation, no dominant mass, nodularity, or nipple discharge noted bilaterally. Axillary nodes negative.      PELVIC EXAM:  EG/BUS: Normal genital architecture without lesions, erythema or abnormal secretions; Bartholin's, Urethra, [place]'s normal   Urethral meatus: normal   Urethra: no masses, tenderness, or scarring   Bladder: no masses; mild tenderness   Vagina: moist, pink, rugae with creamy, white and odorless secretions  Cervix: pink, most, closed, nulliparous, no CMT or lesions  Uterus: anteverted, non-tender, small, mobile  Adnexa: Within normal limits and No masses, nodularity, tenderness  Rectum: anus normal     UA RESULTS:  Recent Labs   Lab Test 06/29/23  0934 11/11/19  1333 06/12/17  1729   COLOR Yellow   < > Yellow   APPEARANCE Clear   < > Clear   URINEGLC Negative   < > Negative   URINEBILI Small*   < > Negative   URINEKETONE Negative   < > Negative   SG 1.025   < > 1.015   UBLD Negative   < > Trace*   URINEPH 6.0   < > 6.0   PROTEIN 30*   < > Negative   UROBILINOGEN 0.2   < > 0.2   NITRITE Negative   < > Negative   LEUKEST Negative   < > Negative   RBCU  --   --  O - 2   WBCU  --   --  O - 2    < > = values in this interval not displayed.     ASSESSMENT:  Encounter Diagnoses   Name Primary?     Screening for cervical cancer      Endometrial hyperplasia, simple      " Personal history of urinary tract infection      Encounter for gynecological examination without abnormal finding Yes     Family history of malignant neoplasm of breast      History of HPV infection       58 year old Female Pelvic and Breast Exam    PLAN:   Orders Placed This Encounter   Procedures     Obtaining, preparing and conveyance of cervical or vaginal smear to laboratory.     [place] Urine Macroscopic POCT       1. Screening for cervical cancer/ 2. Hx of HPV  - Obtaining, preparing and conveyance of cervical or vaginal smear to laboratory.  - Pap thin layer screen with HPV - recommended age 30 - 65 years    3. Endometrial hyperplasia, simple  - Counseled pt on options for continued management. Last EMB was negative in 2021.  She is now 18 months since her LMP and has not had any post menopausal bleeding. Denies pelvic pain and bloating. Offered for patient to continue on Prometrium daily - discussed benefits and risks - vs repeating EMB with the plan to stop the Prometrium if the result is normal. Pt desires to stay on Prometrium at this time.    - progesterone (PROMETRIUM) 100 MG capsule; Take 2 capsules (200 mg) by mouth daily  Dispense: 180 capsule; Refill: 1    4. Personal history of urinary tract infection  - UA negative for infection today. Recommend trial of AZO urinary relief for continued bladder discomfort and increase hydration.     5. Encounter for gynecological examination without abnormal finding  - Counseled on management of vaginal dryness with OTC vaginal moisturizers and lubricant for IC    6. Family history of malignant neoplasm of breast  - According to National Cancer [place] Breast Cancer Risk Calculator, Joan's lifetime risk for breast cancer is 14%. Discussed this with Joan and the limitations of this calculator, as it asks for # of first degree relatives with breast cancer and she has 1, but only has 1 female first degree relative.  Offered patient genetic counseling to  further evaluate her risk and discuss genetic testing and cancer screenings.  Pt will consider and reach out to provider if she desires referral.  - Mammogram is up to date    Return in one year/PRN for preventive care or problems/concerns.    I, Corie Jeffries, completed the PFSH and ROS. I then acted as a scribe for DAVID Dodson, for the remainder of the visit.  Corie Jeffries    I agree with the PFSH and ROS as completed by the DAVID Student, except for changes made by me.  The remainder of the encounter was performed by me and scribed by the DAVID Student.  The scribed note accurately reflects my personal services and decisions made by me.  Abby Mena, DNP, APRN, DAVID

## 2023-06-29 NOTE — PATIENT INSTRUCTIONS
Thank you for trusting us with your care!     If you need to contact us for questions about:  Symptoms, Scheduling & Medical Questions; Non-urgent (2-3 day response) Allan message, Urgent (needing response today) [phone] (if after 3:30pm next day response)   Prescriptions: Please call your Pharmacy   Billing: Doris [phone] or [facility]:[phone]

## 2023-06-29 NOTE — LETTER
"2023       RE: Joan Lam  [address] [facility] [place] [address]     Dear Colleague,    Thank you for referring your patient, Joan Lam, to the [facility] [place] at [facility]. Please see a copy of my visit note below.    Progress Note    SUBJECTIVE:  Joan Lam is an 58 year old, , who requests a breast and pelvic exam.  Patient is followed by Dr. Aguila for primary care.      Concerns today include:    1. Was treated for a UTI- 2023. She is having some mild bladder discomfort, however, all of her other symptoms have resolved. She recalls this lingering after her last UTI as well, and then spontaneously resolved. Last UTI was several yrs ago.     2. Hx of simple endometrial hyperplasia without atypia: First diagnosed 2018 (perimenopausal). Biopsy 2019 was normal; repeat biopsy on 3/11/2020 showed simple endometrial hyperplasia without atypia, again.  She had an EMB 3/3/2021 which was normal. She continues to take 200mg Prometrium daily, which she started before  - originally prescribed to help improve \"sleep and well-being\". No SE from this medication and she desires to stay on it.     3. Family history of breast and colon cancer: Her mother was diagnosed with breast cancer in her 70s and her maternal aunt had breast cancer diagnosed in her late 50's/ early 60's. Of note, patient does not have any sisters and has not had any children. Menarche at age 16.  Nobody has had genetic testing that she is aware of.  Pt has not had genetic counseling. Getting mammograms yearly. No hx of abnormal results.      4. Hx of HPV pos pap smear, due for pap smear today based on hx below:  19: NIL pap, + HR HPV (not 16 or 18) result, EMB neg for dysplasia. Plan cotest in 1 year.   20: NIL pap, + HR HPV (not 16 or 18) result. Plan [place].  3/11/20 colp bx and ECC - no dysplasia. Plan: Would advise " Mirena IUD, will likely plan repeat endometrial biopsy at the time of placement.    03/3/21 EMB Neg, NIL Pap, + HR HPV (not 16 or 18) Plan 1 yr co-test.  4/21/22 NIL, +HR HPV, not 16/18. Plan [place] bef 7/21/22 6/9/22 [place]: Benign. Plan 1 yr co-test.   Due for a pap with co-test today.        Sexual hx: Is sexually active with one partner. Denies any risk for STD exposure. Having vaginal dryness, which she is using lubricants for. Otherwise no sexual health concerns.     Last mammogram: 3/2023 normal.     Preventive health visit with PCP 9/2023 - Labs done at that time    Menstrual History:      7/10/2019     8:20 AM 10/21/2019    10:20 AM 11/11/2019     1:40 PM 3/3/2021    10:30 AM 5/25/2021     7:40 AM   Menstrual History   LAST MENSTRUAL PERIOD 7/5/2019 7/10/2019 10/21/2019 2/17/2021 2/21/2021       HISTORY:  Prescription Medications as of 6/29/2023         Rx Number Disp Refills Start End Last Dispensed Date Next Fill Date Owning Pharmacy    amLODIPine (NORVASC) 5 MG tablet  90 tablet 3 10/28/2022    [facility] ([facility] ) - [place], [place] - [facility]    Sig: TAKE 1 TABLET BY MOUTH  DAILY    Class: E-Prescribe    Notes to Pharmacy: Requesting 1 year supply    buPROPion (WELLBUTRIN XL) 150 MG 24 hr tablet    4/21/2023        Class: Historical    cetirizine (ZYRTEC) 10 MG tablet            Sig: Take 10 mg by mouth daily    Class: Historical    Route: Oral    cholecalciferol (VITAMIN D3) 1000 UNIT tablet    12/13/2017    [id number] - [place], [place] - [address]    Sig: Take 3 tablets (3,000 Units) by mouth daily    Class: Historical    Route: Oral    desonide (DESOWEN) 0.05 % external ointment  15 g 1 8/11/2022    [facility] ([facility]) - [place], [place] - [address] [place] [address]    Sig: Apply topically 2 times daily as needed (to affected area) up to one week at a time.    Class: E-Prescribe    Route: Topical    FLUoxetine (PROZAC)  20 MG capsule  180 capsule 3 10/7/2022    [facility] ([facility] ) - [place], [place] - [address]    Sig: TAKE 2 CAPSULES BY MOUTH  DAILY    Class: E-Prescribe    Notes to Pharmacy: Requesting 1 year supply    fluticasone (FLONASE) 50 MCG/ACT nasal spray            Sig: Spray 2 sprays into both nostrils daily    Class: Historical    Route: Both Nostrils    levothyroxine (SYNTHROID/LEVOTHROID) 100 MCG tablet  90 tablet 3 8/18/2022    [facility] ([facility]) - [place], [place] - [address]    Sig: TAKE 1 TABLET BY MOUTH  DAILY    Class: E-Prescribe    Notes to Pharmacy: Requesting 1 year supply    LORazepam (ATIVAN) 0.5 MG tablet  60 tablet 0 9/13/2022    [id number] - [place], [place] - [address] AT [facility]    Sig: TAKE 1 TABLET(0.5 MG) BY MOUTH DAILY AS NEEDED FOR ANXIETY    Class: E-Prescribe    progesterone (PROMETRIUM) 100 MG capsule  180 capsule 1 6/29/2023    [id number] - [place], [place] - [address] AT [facility]    Sig: Take 2 capsules (200 mg) by mouth daily    Class: E-Prescribe    Route: Oral    VALERIAN ROOT PO        [facility] - [place], [place] - [address]    Sig: Take 1,000 mg by mouth    Class: Historical    Route: Oral          Allergies   Allergen Reactions    No Known Drug Allergy      Immunization History   Administered Date(s) Administered    COVID-19 Bivalent 18+ (Moderna) 12/19/2022    COVID-19 MONOVALENT 12+ (Pfizer) 03/31/2021, 04/21/2021, 11/22/2021    COVID-19 Monovalent 12+ (Pfizer 2022) 08/12/2022    FLU 6-35 months 12/02/2009, 10/22/2016    Influenza (IIV3) PF 11/04/2010, 11/03/2011, 10/18/2012, 09/19/2013, 12/18/2013    Influenza Vaccine >6 months (Alfuria,Fluzone) 09/11/2014, 09/20/2016, 10/27/2017, 11/07/2018, 10/11/2019, 10/31/2020, 10/18/2021, 08/12/2022    TD,PF 7+ (Tenivac) 06/13/2005    TDAP (Adacel,Boostrix) 09/13/2022    TDAP Vaccine  (Adacel) 2012    Zoster recombinant adjuvanted (SHINGRIX) 10/18/2021, 2022       OB History    Para Term  AB Living   0 0 0 0 0 0   SAB IAB Ectopic Multiple Live Births   0 0 0 0 0     Past Medical History:   Diagnosis Date    Allergic rhinitis 2005    Anxiety disorder     Has Panic Attacks    Cervical high risk HPV (human papillomavirus) test positive 2019, 20, 03/3/21    Depressive disorder     and NATALIE. Taking Fluoxetine (40mg)    History of prolactinoma     Dr. Fischer, [facility]    Hypertension 2016    Hypothyroidism      Past Surgical History:   Procedure Laterality Date    BIOPSY  mid-'s    from cervix after abnormal Pap. Benign.    BUNIONECTOMY RT/LT      Right Foot    BUNIONECTOMY RT/LT      Left Foot     COLONOSCOPY  January 15, 2018    1 polyp - benign. Next exam in 10 years.     Family History   Problem Relation Age of Onset    Breast Cancer Mother         Cancer-free for 2 years    Hypertension Father     Cerebrovascular Disease Maternal Grandmother     Colon Cancer Maternal Grandmother 67    Substance Abuse Maternal Grandfather     Anxiety Disorder Paternal Grandmother     Depression Paternal Grandfather     Substance Abuse Brother     Breast Cancer Maternal Aunt 60        Mat. Aunt - cancer free now     Social History     Socioeconomic History    Marital status: Single     Spouse name: Single    Number of children: 0    Years of education: None    Highest education level: None   Occupational History    Occupation:      Employer: [employer]   Tobacco Use    Smoking status: Never    Smokeless tobacco: Never   Substance and Sexual Activity    Alcohol use: Yes     Comment: once a month-2 drinks each time    Drug use: No    Sexual activity: Not Currently     Partners: Male   Other Topics Concern    Parent/sibling w/ CABG, MI or angioplasty before 65F 55M? 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 Body mass index is 32.27 kg/m .  General: pleasant female in no acute distress  Psych: normal mentation, well oriented  Respiratory:  Unlabored breathing  Musculoskeletal: no gross deformities    BREAST EXAM:  Breast: Without visible skin changes. No dimpling or lesions seen.   Breasts supple, non-tender with palpation, no dominant mass, nodularity, or nipple discharge noted bilaterally. Axillary nodes negative.      PELVIC EXAM:  EG/BUS: Normal genital architecture without lesions, erythema or abnormal secretions; Bartholin's, Urethra, Inland's normal   Urethral meatus: normal   Urethra: no masses, tenderness, or scarring   Bladder: no masses; mild tenderness   Vagina: moist, pink, rugae with creamy, white and odorless secretions  Cervix: pink, most, closed, nulliparous, no CMT or lesions  Uterus: anteverted, non-tender, small, mobile  Adnexa: Within normal limits and No masses, nodularity, tenderness  Rectum: anus normal     UA RESULTS:  Recent Labs   Lab Test 06/29/23  0934 11/11/19  1333 06/12/17  1729   COLOR Yellow   < > Yellow   APPEARANCE Clear   < > Clear   URINEGLC Negative   < > Negative   URINEBILI Small*   < > Negative   URINEKETONE Negative   < > Negative   SG 1.025   < > 1.015   UBLD Negative   < > Trace*   URINEPH 6.0   < > 6.0   PROTEIN 30*   < > Negative   UROBILINOGEN 0.2   < > 0.2   NITRITE Negative   < > " Negative   LEUKEST Negative   < > Negative   RBCU  --   --  O - 2   WBCU  --   --  O - 2    < > = values in this interval not displayed.     ASSESSMENT:  Encounter Diagnoses   Name Primary?    Screening for cervical cancer     Endometrial hyperplasia, simple     Personal history of urinary tract infection     Encounter for gynecological examination without abnormal finding Yes    Family history of malignant neoplasm of breast     History of HPV infection       58 year old Female Pelvic and Breast Exam    PLAN:   Orders Placed This Encounter   Procedures    Obtaining, preparing and conveyance of cervical or vaginal smear to laboratory.    Clinitek Urine Macroscopic POCT       1. Screening for cervical cancer/ 2. Hx of HPV  - Obtaining, preparing and conveyance of cervical or vaginal smear to laboratory.  - Pap thin layer screen with HPV - recommended age 30 - 65 years    3. Endometrial hyperplasia, simple  - Counseled pt on options for continued management. Last EMB was negative in 2021.  She is now 18 months since her LMP and has not had any post menopausal bleeding. Denies pelvic pain and bloating. Offered for patient to continue on Prometrium daily - discussed benefits and risks - vs repeating EMB with the plan to stop the Prometrium if the result is normal. Pt desires to stay on Prometrium at this time.    - progesterone (PROMETRIUM) 100 MG capsule; Take 2 capsules (200 mg) by mouth daily  Dispense: 180 capsule; Refill: 1    4. Personal history of urinary tract infection  - UA negative for infection today. Recommend trial of AZO urinary relief for continued bladder discomfort and increase hydration.     5. Encounter for gynecological examination without abnormal finding  - Counseled on management of vaginal dryness with OTC vaginal moisturizers and lubricant for IC    6. Family history of malignant neoplasm of breast  - According to National Cancer [place] Breast Cancer Risk Calculator, Joan's lifetime risk  for breast cancer is 14%. Discussed this with Joan and the limitations of this calculator, as it asks for # of first degree relatives with breast cancer and she has 1, but only has 1 female first degree relative.  Offered patient genetic counseling to further evaluate her risk and discuss genetic testing and cancer screenings.  Pt will consider and reach out to provider if she desires referral.  - Mammogram is up to date    Return in one year/PRN for preventive care or problems/concerns.    I, Corie Jeffries, completed the PFSH and ROS. I then acted as a scribe for DAVID Dodson, for the remainder of the visit.  Corie Jeffries    I agree with the PFSH and ROS as completed by the DAVID Student, except for changes made by me.  The remainder of the encounter was performed by me and scribed by the DAVID Student.  The scribed note accurately reflects my personal services and decisions made by me.  Abby Mena, DNP, APRN, DAVID

## 2023-07-05 LAB
BKR LAB AP GYN ADEQUACY: NORMAL
BKR LAB AP GYN INTERPRETATION: NORMAL
BKR LAB AP HPV REFLEX: NORMAL
BKR LAB AP PREVIOUS ABNL DX: NORMAL
BKR LAB AP PREVIOUS ABNORMAL: NORMAL
PATH REPORT.COMMENTS IMP SPEC: NORMAL
PATH REPORT.COMMENTS IMP SPEC: NORMAL
PATH REPORT.RELEVANT HX SPEC: NORMAL

## 2023-07-07 ENCOUNTER — PATIENT OUTREACH (OUTPATIENT)
Dept: OBGYN | Facility: CLINIC | Age: 58
End: 2023-07-07
Payer: COMMERCIAL

## 2023-07-21 DIAGNOSIS — E03.9 HYPOTHYROIDISM, UNSPECIFIED TYPE: ICD-10-CM

## 2023-07-22 RX ORDER — LEVOTHYROXINE SODIUM 100 UG/1
TABLET ORAL
Qty: 90 TABLET | Refills: 0 | Status: SHIPPED | OUTPATIENT
Start: 2023-07-22 | End: 2023-11-13

## 2023-07-22 NOTE — TELEPHONE ENCOUNTER
"Last Written Prescription Date:  8/18/22  Last Fill Quantity: 90,  # refills: 3   Last office visit provider:  9/13/22     Requested Prescriptions   Pending Prescriptions Disp Refills     levothyroxine (SYNTHROID/LEVOTHROID) 100 MCG tablet [Pharmacy Med Name: MYLAN-LEVOTHYROX 100MCG TABLET] 90 tablet 3     Sig: TAKE 1 TABLET BY MOUTH  DAILY       Thyroid Protocol Passed - 7/21/2023  4:24 AM        Passed - Patient is 12 years or older        Passed - Recent (12 mo) or future (30 days) visit within the authorizing provider's specialty     Patient has had an office visit with the authorizing provider or a provider within the authorizing providers department within the previous 12 mos or has a future within next 30 days. See \"Patient Info\" tab in inbasket, or \"Choose Columns\" in Meds & Orders section of the refill encounter.              Passed - Medication is active on med list        Passed - Normal TSH on file in past 12 months     Recent Labs   Lab Test 09/13/22  0854   TSH 0.82              Passed - No active pregnancy on record     If patient is pregnant or has had a positive pregnancy test, please check TSH.          Passed - No positive pregnancy test in past 12 months     If patient is pregnant or has had a positive pregnancy test, please check TSH.               Bisi Linares RN 07/22/23 3:29 PM  "

## 2023-08-01 ENCOUNTER — MYC REFILL (OUTPATIENT)
Dept: FAMILY MEDICINE | Facility: CLINIC | Age: 58
End: 2023-08-01
Payer: COMMERCIAL

## 2023-08-01 DIAGNOSIS — F41.1 GAD (GENERALIZED ANXIETY DISORDER): ICD-10-CM

## 2023-08-03 RX ORDER — LORAZEPAM 0.5 MG/1
TABLET ORAL
Qty: 60 TABLET | Refills: 0 | Status: SHIPPED | OUTPATIENT
Start: 2023-08-03 | End: 2023-11-21

## 2023-08-03 NOTE — TELEPHONE ENCOUNTER
Routing refill request to provider for review/approval because:  --Drug not on the [facility] refill protocol.      --Last visit:  9/13/2022 [facility] for CPE.    --Future Visit: 9/26/23 [facility] for CPE.        --Last Written Prescription:     Disp Refills Start End MANDY   LORazepam (ATIVAN) 0.5 MG tablet 60 tablet 0 9/13/2022  No   Sig: TAKE 1 TABLET(0.5 MG) BY MOUTH DAILY AS NEEDED FOR ANXIETY

## 2023-09-29 ENCOUNTER — MYC MEDICAL ADVICE (OUTPATIENT)
Dept: FAMILY MEDICINE | Facility: CLINIC | Age: 58
End: 2023-09-29
Payer: COMMERCIAL

## 2023-09-29 DIAGNOSIS — H93.13 TINNITUS, BILATERAL: Primary | ICD-10-CM

## 2023-09-29 NOTE — TELEPHONE ENCOUNTER
Dr. Aguila: pended both ENT & audiology referrals    I'm wondering if I should go to a specialist, and if so, what kind:  For the past 2 years I have had a ringing in my ears that I'm noticing all the time now. (when I go to loud events/live music I always wear earplugs.)  At first only noticeable occasionally (1x a day) but now it's constant.      About 3.5 years ago, I had the middle ear temporary vertigo that I resolved with some PT/head movements. I also got very sick with a head cold/virus last May 2023, where my ears plugged up for about 4-5 days. Not sure if any of this is related or not to the ear ringing.  Any thoughts on how to proceed are appreciated. Thanks!    YOGI [employer]

## 2023-10-05 NOTE — TELEPHONE ENCOUNTER
FUTURE VISIT INFORMATION      FUTURE VISIT INFORMATION:  Date: 12/4/23  Time: 9:40  Location: [facility]  REFERRAL INFORMATION:  Referring provider:  Doreen Aguila MD  Referring providers clinic:  [facility]  Reason for visit/diagnosis  Tinnitus, bilateral. Referred by Doreen Aguila MD in [facility]. Appt per pt. Recs in [facility]. [facility] confirmed.     RECORDS REQUESTED FROM:       Clinic name Comments Records Status Imaging Status   [facility]  9/29/23- Doreen Campos MD Epic

## 2023-10-30 DIAGNOSIS — I10 ESSENTIAL HYPERTENSION WITH GOAL BLOOD PRESSURE LESS THAN 140/90: ICD-10-CM

## 2023-10-31 RX ORDER — AMLODIPINE BESYLATE 5 MG/1
TABLET ORAL
Qty: 90 TABLET | Refills: 3 | Status: SHIPPED | OUTPATIENT
Start: 2023-10-31

## 2023-11-10 DIAGNOSIS — E03.9 HYPOTHYROIDISM, UNSPECIFIED TYPE: ICD-10-CM

## 2023-11-10 DIAGNOSIS — F41.1 GAD (GENERALIZED ANXIETY DISORDER): ICD-10-CM

## 2023-11-10 DIAGNOSIS — F33.42 MAJOR DEPRESSIVE DISORDER, RECURRENT EPISODE, IN FULL REMISSION (H): ICD-10-CM

## 2023-11-13 RX ORDER — LEVOTHYROXINE SODIUM 100 UG/1
TABLET ORAL
Qty: 30 TABLET | Refills: 0 | Status: SHIPPED | OUTPATIENT
Start: 2023-11-13 | End: 2023-11-22

## 2023-11-21 ENCOUNTER — OFFICE VISIT (OUTPATIENT)
Dept: FAMILY MEDICINE | Facility: CLINIC | Age: 58
End: 2023-11-21
Attending: FAMILY MEDICINE
Payer: COMMERCIAL

## 2023-11-21 VITALS
RESPIRATION RATE: 15 BRPM | SYSTOLIC BLOOD PRESSURE: 127 MMHG | HEIGHT: 65 IN | TEMPERATURE: 98 F | OXYGEN SATURATION: 99 % | DIASTOLIC BLOOD PRESSURE: 85 MMHG | WEIGHT: 189 LBS | HEART RATE: 76 BPM | BODY MASS INDEX: 31.49 KG/M2

## 2023-11-21 DIAGNOSIS — E03.9 HYPOTHYROIDISM, UNSPECIFIED TYPE: ICD-10-CM

## 2023-11-21 DIAGNOSIS — Z00.00 ROUTINE GENERAL MEDICAL EXAMINATION AT A HEALTH CARE FACILITY: Primary | ICD-10-CM

## 2023-11-21 DIAGNOSIS — I10 ESSENTIAL HYPERTENSION WITH GOAL BLOOD PRESSURE LESS THAN 140/90: ICD-10-CM

## 2023-11-21 DIAGNOSIS — F41.1 GAD (GENERALIZED ANXIETY DISORDER): ICD-10-CM

## 2023-11-21 DIAGNOSIS — F33.42 MAJOR DEPRESSIVE DISORDER, RECURRENT EPISODE, IN FULL REMISSION (H): ICD-10-CM

## 2023-11-21 DIAGNOSIS — E03.9 ACQUIRED HYPOTHYROIDISM: ICD-10-CM

## 2023-11-21 DIAGNOSIS — Z23 NEED FOR VACCINATION: ICD-10-CM

## 2023-11-21 LAB
ANION GAP SERPL CALCULATED.3IONS-SCNC: 9 MMOL/L (ref 7–15)
BUN SERPL-MCNC: 14.1 MG/DL (ref 6–20)
CALCIUM SERPL-MCNC: 9.4 MG/DL (ref 8.6–10)
CHLORIDE SERPL-SCNC: 105 MMOL/L (ref 98–107)
CREAT SERPL-MCNC: 1.02 MG/DL (ref 0.51–0.95)
CREAT UR-MCNC: 439 MG/DL
DEPRECATED HCO3 PLAS-SCNC: 28 MMOL/L (ref 22–29)
EGFRCR SERPLBLD CKD-EPI 2021: 64 ML/MIN/1.73M2
GLUCOSE SERPL-MCNC: 100 MG/DL (ref 70–99)
MICROALBUMIN UR-MCNC: 79.4 MG/L
MICROALBUMIN/CREAT UR: 18.09 MG/G CR (ref 0–25)
POTASSIUM SERPL-SCNC: 4.3 MMOL/L (ref 3.4–5.3)
SODIUM SERPL-SCNC: 142 MMOL/L (ref 135–145)
TSH SERPL DL<=0.005 MIU/L-ACNC: 1.26 UIU/ML (ref 0.3–4.2)

## 2023-11-21 PROCEDURE — 82570 ASSAY OF URINE CREATININE: CPT | Performed by: FAMILY MEDICINE

## 2023-11-21 PROCEDURE — 96127 BRIEF EMOTIONAL/BEHAV ASSMT: CPT | Performed by: FAMILY MEDICINE

## 2023-11-21 PROCEDURE — 36415 COLL VENOUS BLD VENIPUNCTURE: CPT | Performed by: FAMILY MEDICINE

## 2023-11-21 PROCEDURE — 80048 BASIC METABOLIC PNL TOTAL CA: CPT | Performed by: FAMILY MEDICINE

## 2023-11-21 PROCEDURE — 84443 ASSAY THYROID STIM HORMONE: CPT | Performed by: FAMILY MEDICINE

## 2023-11-21 PROCEDURE — 90636 HEP A/HEP B VACC ADULT IM: CPT | Performed by: FAMILY MEDICINE

## 2023-11-21 PROCEDURE — 90471 IMMUNIZATION ADMIN: CPT | Performed by: FAMILY MEDICINE

## 2023-11-21 PROCEDURE — 99396 PREV VISIT EST AGE 40-64: CPT | Mod: 25 | Performed by: FAMILY MEDICINE

## 2023-11-21 PROCEDURE — 82043 UR ALBUMIN QUANTITATIVE: CPT | Performed by: FAMILY MEDICINE

## 2023-11-21 PROCEDURE — 99214 OFFICE O/P EST MOD 30 MIN: CPT | Mod: 25 | Performed by: FAMILY MEDICINE

## 2023-11-21 RX ORDER — LORAZEPAM 0.5 MG/1
TABLET ORAL
Qty: 30 TABLET | Refills: 1 | Status: SHIPPED | OUTPATIENT
Start: 2023-11-21

## 2023-11-21 RX ORDER — LEVOTHYROXINE SODIUM 100 UG/1
100 TABLET ORAL DAILY
Qty: 30 TABLET | Refills: 0 | Status: CANCELLED | OUTPATIENT
Start: 2023-11-21

## 2023-11-21 RX ORDER — FLUOXETINE 10 MG/1
30 CAPSULE ORAL DAILY
Qty: 270 CAPSULE | Refills: 1 | Status: SHIPPED | OUTPATIENT
Start: 2023-11-21 | End: 2024-02-27

## 2023-11-21 RX ORDER — BUPROPION HYDROCHLORIDE 300 MG/1
300 TABLET ORAL EVERY MORNING
COMMUNITY
Start: 2023-11-21

## 2023-11-21 ASSESSMENT — ENCOUNTER SYMPTOMS
HEMATOCHEZIA: 0
EYE PAIN: 0
HEMATURIA: 0
SHORTNESS OF BREATH: 0
BREAST MASS: 0
WEAKNESS: 0
CHILLS: 0
MYALGIAS: 0
ARTHRALGIAS: 1
HEARTBURN: 1
PALPITATIONS: 0
NERVOUS/ANXIOUS: 1
CONSTIPATION: 0
HEADACHES: 1
SORE THROAT: 0
DIZZINESS: 0
DIARRHEA: 0
PARESTHESIAS: 0
COUGH: 0
FREQUENCY: 0
DYSURIA: 0
FEVER: 0
ABDOMINAL PAIN: 0
JOINT SWELLING: 0
NAUSEA: 0

## 2023-11-21 ASSESSMENT — ANXIETY QUESTIONNAIRES
1. FEELING NERVOUS, ANXIOUS, OR ON EDGE: SEVERAL DAYS
IF YOU CHECKED OFF ANY PROBLEMS ON THIS QUESTIONNAIRE, HOW DIFFICULT HAVE THESE PROBLEMS MADE IT FOR YOU TO DO YOUR WORK, TAKE CARE OF THINGS AT HOME, OR GET ALONG WITH OTHER PEOPLE: NOT DIFFICULT AT ALL
GAD7 TOTAL SCORE: 4
4. TROUBLE RELAXING: SEVERAL DAYS
3. WORRYING TOO MUCH ABOUT DIFFERENT THINGS: SEVERAL DAYS
5. BEING SO RESTLESS THAT IT IS HARD TO SIT STILL: NOT AT ALL
2. NOT BEING ABLE TO STOP OR CONTROL WORRYING: NOT AT ALL
6. BECOMING EASILY ANNOYED OR IRRITABLE: NOT AT ALL
7. FEELING AFRAID AS IF SOMETHING AWFUL MIGHT HAPPEN: SEVERAL DAYS
GAD7 TOTAL SCORE: 4

## 2023-11-21 ASSESSMENT — PAIN SCALES - GENERAL: PAINLEVEL: NO PAIN (0)

## 2023-11-21 NOTE — PROGRESS NOTES
SUBJECTIVE:   Joan is a 58 year old, presenting for the following:  Physical (Physical )        11/21/2023    10:24 AM   Additional Questions   Roomed by Yojana Shepard       Healthy Habits:     Getting at least 3 servings of Calcium per day:  Yes    Bi-annual eye exam:  Yes    Dental care twice a year:  Yes    Sleep apnea or symptoms of sleep apnea:  None    Diet:  Low fat/cholesterol    Frequency of exercise:  4-5 days/week    Duration of exercise:  15-30 minutes    Taking medications regularly:  Yes    Medication side effects:  None    Additional concerns today:  No      Today's PHQ-9 Score:       11/21/2023     8:47 AM   PHQ-9 SCORE   PHQ-9 Total Score MyChart 1 (Minimal depression)   PHQ-9 Total Score 1         Depression and Anxiety Follow-Up  Mood and anxiety are well controlled.  She does not have a history of seasonal affective disorder.  She continues to use lorazepam sparingly with #60 lasting her over a year.      Social History     Tobacco Use    Smoking status: Never    Smokeless tobacco: Never   Vaping Use    Vaping Use: Never used   Substance Use Topics    Alcohol use: Yes     Comment: Less than 4 servings (drinks) per month    Drug use: No         9/13/2022     8:02 AM 6/29/2023     8:31 AM 11/21/2023     8:47 AM   PHQ   PHQ-9 Total Score 0 1 1   Q9: Thoughts of better off dead/self-harm past 2 weeks Not at all Not at all Not at all         9/13/2022     8:02 AM 6/29/2023     8:31 AM 11/21/2023     8:48 AM   NATALIE-7 SCORE   Total Score 1 (minimal anxiety)  4 (minimal anxiety)   Total Score 1 3 4       Suicide Assessment Five-step Evaluation and Treatment (SAFE-T)        Social History     Tobacco Use    Smoking status: Never    Smokeless tobacco: Never   Substance Use Topics    Alcohol use: Yes     Comment: Less than 4 servings (drinks) per month           11/21/2023     8:51 AM   Alcohol Use   Prescreen: >3 drinks/day or >7 drinks/week? No       Reviewed orders with patient.  Reviewed health  maintenance and updated orders accordingly - Yes  BP Readings from Last 3 Encounters:   11/21/23 127/85   06/29/23 133/78   01/25/23 (!) 150/88    Wt Readings from Last 3 Encounters:   11/21/23 85.7 kg (189 lb)   06/29/23 88.4 kg (194 lb 14.4 oz)   01/25/23 88 kg (194 lb)             Breast Cancer Screening:  FHS-7:       5/25/2021     7:33 AM 3/18/2022     9:43 AM 9/13/2022     8:11 AM 3/23/2023     9:16 AM 3/23/2023     9:22 AM 11/21/2023     8:57 AM   Breast CA Risk Assessment (FHS-7)   Did any of your first-degree relatives have breast or ovarian cancer? Yes Yes Yes Yes Yes Yes   Did any of your relatives have bilateral breast cancer? Unknown No Unknown  No Unknown   Did any man in your family have breast cancer? Unknown No No  No No   Did any woman in your family have breast and ovarian cancer? Yes No Yes  No No   Did any woman in your family have breast cancer before age 50 y? Unknown No Unknown  No Unknown   Do you have 2 or more relatives with breast and/or ovarian cancer? Yes Yes Unknown  Yes Yes   Do you have 2 or more relatives with breast and/or bowel cancer? Yes Yes Yes  Yes No       Mammogram Screening: Recommended mammography every 1-2 years with patient discussion and risk factor consideration  Pertinent mammograms are reviewed under the imaging tab.    History of abnormal Pap smear: YES - updated in Problem List and Health Maintenance accordingly      Latest Ref Rng & Units 6/29/2023     8:56 AM 4/21/2022     8:51 AM 3/3/2021    10:30 AM   PAP / HPV   PAP  Negative for Intraepithelial Lesion or Malignancy (NILM)  Negative for Intraepithelial Lesion or Malignancy (NILM)     HPV 16 DNA Negative Negative  Negative  Negative    HPV 18 DNA Negative Negative  Negative  Negative    Other HR HPV Negative Positive  Positive  Positive      Reviewed and updated as needed this visit by clinical staff   Tobacco  Allergies  Meds  Problems             Reviewed and updated as needed this visit by Provider      Meds  Problems            Past Medical History:   Diagnosis Date    Allergic rhinitis 2005    Anxiety disorder     Has Panic Attacks    Cervical high risk HPV (human papillomavirus) test positive 2019, 20, 03/3/21    Depressive disorder     and NATALIE. Taking Fluoxetine (40mg)    History of prolactinoma     Dr. Fischer, [facility]    Hypertension 2016    Hypothyroidism       Past Surgical History:   Procedure Laterality Date    BIOPSY  mid-90's    from cervix after abnormal Pap. Benign.    BUNIONECTOMY RT/LT      Right Foot    BUNIONECTOMY RT/LT      Left Foot     COLONOSCOPY  January 15, 2018    1 polyp - benign. Next exam in 10 years.     OB History    Para Term  AB Living   0 0 0 0 0 0   SAB IAB Ectopic Multiple Live Births   0 0 0 0 0       Review of Systems   Constitutional:  Negative for chills and fever.   HENT:  Positive for congestion and hearing loss. Negative for ear pain and sore throat.    Eyes:  Negative for pain and visual disturbance.   Respiratory:  Negative for cough and shortness of breath.    Cardiovascular:  Negative for chest pain, palpitations and peripheral edema.   Gastrointestinal:  Positive for heartburn. Negative for abdominal pain, constipation, diarrhea, hematochezia and nausea.   Breasts:  Negative for tenderness, breast mass and discharge.   Genitourinary:  Negative for dysuria, frequency, genital sores, hematuria, pelvic pain, urgency, vaginal bleeding and vaginal discharge.   Musculoskeletal:  Positive for arthralgias. Negative for joint swelling and myalgias.   Skin:  Negative for rash.   Neurological:  Positive for headaches. Negative for dizziness, weakness and paresthesias.   Psychiatric/Behavioral:  Negative for mood changes. The patient is nervous/anxious.      All above symptoms are mild and chronic.  She has upcoming audiology appt.  She uses TUMS infrequently PRN and avoids heartburn trigger  "foods.     OBJECTIVE:   /85 (BP Location: Right arm, Patient Position: Sitting, Cuff Size: Adult Large)   Pulse 76   Temp 98  F (36.7  C) (Temporal)   Resp 15   Ht 1.64 m (5' 4.57\")   Wt 85.7 kg (189 lb)   LMP 02/21/2021 (LMP Unknown)   SpO2 99%   BMI 31.87 kg/m    Physical Exam  GENERAL: healthy, alert and no distress  EYES: Eyes grossly normal to inspection, conjunctivae and sclerae normal  HENT: ear canals and TM's normal  NECK: no adenopathy, no asymmetry, masses, or scars and thyroid normal to palpation  RESP: lungs clear to auscultation - no rales, rhonchi or wheezes  CV: regular rate and rhythm, normal S1 S2, no S3 or S4, no murmur, click or rub, no peripheral edema  ABDOMEN: soft, nontender, no hepatosplenomegaly, no masses  MS: no gross musculoskeletal defects noted, no edema  SKIN: no suspicious lesions or rashes  NEURO: Grossly normal strength and tone, mentation intact and speech normal  PSYCH: mentation appears normal, affect normal/bright      ASSESSMENT/PLAN:     Routine general medical examination at a health care facility  Reviewed/updated Health Maintenance     Acquired hypothyroidism  Continue replacement therapy with levothyroxine.  Awaiting TSH results to determine if dose change is indicated.   - TSH WITH FREE T4 REFLEX    Essential hypertension with goal blood pressure less than 140/90  stable/well controlled on amlodipine 5 mg daily.   - BASIC METABOLIC PANEL  - Albumin Random Urine Quantitative with Creat Ratio    Major depressive disorder, recurrent episode, in full remission (H24)  NATALIE (generalized anxiety disorder)  stable/well controlled.  I checked the [place] Prescription Monitoring Program website which showed no concerning activity.   She'd like to try a dose wean of the fluoxetine and notes that she has been prescribed Wellbutrin from her employer's clinic for weight control.  I recommended a very slow wean of the fluoxetine, dropping from 40 mg daily to 30 mg daily and " "holding at that dose for 2-3 months.  If that goes well she can drop dose to 20 mg daily.    - LORazepam (ATIVAN) 0.5 MG tablet  Dispense: 30 tablet; Refill: 1  - FLUoxetine (PROZAC) 10 MG capsule  Dispense: 270 capsule; Refill: 1    Need for vaccination  Discussed CDC recommendation to vaccinate all adults under age 60 against Hepatitis B and that this is a 3-shot vaccine series.  Discussed that this recommendation is a population health strategy and that Hepatitis B is transmitted via blood and bodily fluids.  We discussed option of Twinrix which is also a 3-shot vaccine series and would provide protection against both Hepatitis B and Hepatitis A, which is transmitted via contaminated food and is most prevalent in underdeveloped parts of the world.  She would like to start the Twinrix series today.  - HEP A & B (TWINRIX)      COUNSELING:  Reviewed preventive health counseling, as reflected in patient instructions      BMI:   Estimated body mass index is 31.87 kg/m  as calculated from the following:    Height as of this encounter: 1.64 m (5' 4.57\").    Weight as of this encounter: 85.7 kg (189 lb).   Weight management plan: Discussed healthy diet and exercise guidelines      She reports that she has never smoked. She has never used smokeless tobacco.      Doreen Aguila MD  [facility]      "

## 2023-11-21 NOTE — NURSING NOTE
Prior to immunization administration, verified patients identity using patient s name and date of birth. Please see Immunization Activity for additional information.     Screening Questionnaire for Adult Immunization    Are you sick today?   No   Do you have allergies to medications, food, a vaccine component or latex?   No   Have you ever had a serious reaction after receiving a vaccination?   No   Do you have a long-term health problem with heart, lung, kidney, or metabolic disease (e.g., diabetes), asthma, a blood disorder, no spleen, complement component deficiency, a cochlear implant, or a spinal fluid leak?  Are you on long-term aspirin therapy?   No   Do you have cancer, leukemia, HIV/AIDS, or any other immune system problem?   No   Do you have a parent, brother, or sister with an immune system problem?   Yes   In the past 3 months, have you taken medications that affect  your immune system, such as prednisone, other steroids, or anticancer drugs; drugs for the treatment of rheumatoid arthritis, Crohn s disease, or psoriasis; or have you had radiation treatments?   No   Have you had a seizure, or a brain or other nervous system problem?   No   During the past year, have you received a transfusion of blood or blood    products, or been given immune (gamma) globulin or antiviral drug?   No   For women: Are you pregnant or is there a chance you could become       pregnant during the next month?   No   Have you received any vaccinations in the past 4 weeks?   No     Immunization questionnaire was positive for at least one answer.  Notified sandeep.      Patient instructed to remain in clinic for 15 minutes afterwards, and to report any adverse reactions.     Screening performed by Valencia Vital on 11/21/2023 at 11:17 AM.

## 2023-11-21 NOTE — PATIENT INSTRUCTIONS
Understanding Preventive vs. Non-preventive charges  You were seen today for a preventive visit - for medicare patients this is known as the Annual Welllness Visit.  Due to the Affordable Care Act of 2010, all government-approved preventive services are legally mandated to be covered 100% by your insurance.  This was not always the case and is a great benefit!  Preventive services include cancer screenings, vaccinations, and screening blood tests for conditions you DO NOT already have.  By definition, if you already have a condition or any symptoms of a condition, all ensuing testing and/or treatment (including medication prescriptions) is not preventive - rather it is diagnostic and/or therapeutic and the legally-mandated complete insurance coverage does not apply.  Your coverage for non-preventive services will depend on your individual insurance plan.    If you have any chronic conditions that I am managing with tests or medication (including hypertension, hyperlipidemia, depression, diabetes, asthma, etc.) I do also need to see you at least once annually to address these conditions.  I typically do that at the same time as your annual preventive visit to minimize the number of times you need to come to clinic.  Please understand that when we combine both the preventive visit with the chronic condition visit you will be charged per your insurance plan for the non-preventive services.  If you prefer to come to clinic for two separate visits (one visit for preventive care and one visit for non-preventive care) please let me know.     Patient Education    Preventive Health Recommendations  Female Ages 50 - 64    Yearly exam: See your health care provider every year in order to  Review health changes.   Discuss preventive care.    Review your medicines if your doctor has prescribed any.    Get a Pap test every three years (unless you have an abnormal result and your provider advises testing more often).  If you get  Pap tests with HPV test, you only need to test every 5 years, unless you have an abnormal result.   You do not need a Pap test if your uterus was removed (hysterectomy) and you have not had cancer.  You should be tested each year for STDs (sexually transmitted diseases) if you're at risk.   Have a mammogram every 1 to 2 years.  Have a colonoscopy at age 45, or have a yearly FIT test (stool test). These exams screen for colon cancer.    Have a cholesterol test every 5 years, or more often if advised.  Have a diabetes test (fasting glucose) every three years. If you are at risk for diabetes, you should have this test more often.   If you are at risk for osteoporosis (brittle bone disease), think about having a bone density scan (DEXA).    Shots: Get a flu shot each year. Get a tetanus shot every 10 years.    Nutrition:   Eat at least 5 servings of fruits and vegetables each day.  Eat whole-grain bread, whole-wheat pasta and brown rice instead of white grains and rice.  Get adequate Calcium and Vitamin D.     Lifestyle  Exercise at least 150 minutes a week (30 minutes a day, 5 days a week). This will help you control your weight and prevent disease.  Limit alcohol to one drink per day.  No smoking.   Wear sunscreen to prevent skin cancer.   See your dentist every six months for an exam and cleaning.  See your eye doctor every 1 to 2 years.

## 2023-11-22 RX ORDER — LEVOTHYROXINE SODIUM 100 UG/1
100 TABLET ORAL DAILY
Qty: 90 TABLET | Refills: 3 | Status: SHIPPED | OUTPATIENT
Start: 2023-11-22 | End: 2024-01-17

## 2023-11-22 NOTE — RESULT ENCOUNTER NOTE
Twan Franco,  Your results look ok.  Everything is either normal, stable, or acceptable.  I sent authorization to your pharmacy to fill the levothyroxine at the current dose for another year.    Doreen Aguila MD

## 2023-12-04 ENCOUNTER — PRE VISIT (OUTPATIENT)
Dept: OTOLARYNGOLOGY | Facility: CLINIC | Age: 58
End: 2023-12-04

## 2023-12-04 ENCOUNTER — OFFICE VISIT (OUTPATIENT)
Dept: OTOLARYNGOLOGY | Facility: CLINIC | Age: 58
End: 2023-12-04
Attending: FAMILY MEDICINE
Payer: COMMERCIAL

## 2023-12-04 ENCOUNTER — OFFICE VISIT (OUTPATIENT)
Dept: AUDIOLOGY | Facility: CLINIC | Age: 58
End: 2023-12-04
Attending: FAMILY MEDICINE
Payer: COMMERCIAL

## 2023-12-04 VITALS
BODY MASS INDEX: 32.15 KG/M2 | DIASTOLIC BLOOD PRESSURE: 93 MMHG | HEART RATE: 74 BPM | SYSTOLIC BLOOD PRESSURE: 149 MMHG | HEIGHT: 65 IN | WEIGHT: 193 LBS

## 2023-12-04 DIAGNOSIS — H93.13 TINNITUS, BILATERAL: ICD-10-CM

## 2023-12-04 DIAGNOSIS — Z01.10 EXAMINATION OF EARS AND HEARING: Primary | ICD-10-CM

## 2023-12-04 DIAGNOSIS — H93.13 TINNITUS, BILATERAL: Primary | ICD-10-CM

## 2023-12-04 PROCEDURE — 92557 COMPREHENSIVE HEARING TEST: CPT | Performed by: AUDIOLOGIST

## 2023-12-04 PROCEDURE — 99204 OFFICE O/P NEW MOD 45 MIN: CPT | Performed by: REGISTERED NURSE

## 2023-12-04 PROCEDURE — 92550 TYMPANOMETRY & REFLEX THRESH: CPT | Performed by: AUDIOLOGIST

## 2023-12-04 ASSESSMENT — PAIN SCALES - GENERAL: PAINLEVEL: NO PAIN (0)

## 2023-12-04 NOTE — PROGRESS NOTES
Otolaryngology Clinic  December 4, 2023    Chief Complaint:   Bilateral tinnitus       History of Present Illness:   Joan Lam is a 58 year old female who presents today for evaluation of bilateral tinnitus.  Patient reports an onset of bilateral tinnitus in 2021.  Also reports increased sensitivity to certain noises.  Patient reports that tinnitus is a constant bilateral tone that does not pulsate.  Most bothersome in quiet environments but does not affect ability for patient to fall asleep.  Reports that tinnitus symptoms seem to worsen after a cold last May that included significant sinus congestion and headaches.  Reports bothersome headaches that seem to occur every night when going to bed.  Patient does use saline spray to treat nasal drainage and has used Flonase in the past for allergies.  Patient has a history of BPPV last episode was in 2019.  Reports a family history of hearing loss on her father side.    Patient denies any otalgia, otorrhea, hearing loss, facial numbness/weakness, history of frequent ear infections, or ear surgeries.     Past Medical History:  Past Medical History:   Diagnosis Date     Allergic rhinitis 08/11/2005     Anxiety disorder     Has Panic Attacks     Cervical high risk HPV (human papillomavirus) test positive 01/23/2019 01/23/2019, 02/05/20, 03/3/21     Depressive disorder 1992    and NATALIE. Taking Fluoxetine (40mg)     History of prolactinoma 2005    Dr. Fischer, [facility]     Hypertension 12/2016     Hypothyroidism 2014       Past Surgical History:  Past Surgical History:   Procedure Laterality Date     BIOPSY  mid-90's    from cervix after abnormal Pap. Benign.     BUNIONECTOMY RT/LT  2005    Right Foot     BUNIONECTOMY RT/LT  2006    Left Foot      COLONOSCOPY  January 15, 2018    1 polyp - benign. Next exam in 10 years.       Medications:  Current Outpatient Medications   Medication Sig Dispense Refill     amLODIPine (NORVASC) 5 MG tablet TAKE 1 TABLET  "BY MOUTH DAILY 90 tablet 3     buPROPion (WELLBUTRIN XL) 300 MG 24 hr tablet Take 1 tablet (300 mg) by mouth every morning       cetirizine (ZYRTEC) 10 MG tablet Take 10 mg by mouth daily       cholecalciferol (VITAMIN D3) 1000 UNIT tablet Take 3 tablets (3,000 Units) by mouth daily       FLUoxetine (PROZAC) 10 MG capsule Take 3 capsules (30 mg) by mouth daily 270 capsule 1     fluticasone (FLONASE) 50 MCG/ACT nasal spray Spray 2 sprays into both nostrils daily       levothyroxine (SYNTHROID/LEVOTHROID) 100 MCG tablet Take 1 tablet (100 mcg) by mouth daily 90 tablet 3     LORazepam (ATIVAN) 0.5 MG tablet TAKE 1 TABLET(0.5 MG) BY MOUTH DAILY AS NEEDED FOR ANXIETY 30 tablet 1     progesterone (PROMETRIUM) 100 MG capsule Take 2 capsules (200 mg) by mouth daily 180 capsule 1     desonide (DESOWEN) 0.05 % external ointment Apply topically 2 times daily as needed (to affected area) up to one week at a time. (Patient not taking: Reported on 6/29/2023) 15 g 1     VALERIAN ROOT PO Take 1,000 mg by mouth (Patient not taking: Reported on 6/29/2023)         Allergies:  Allergies   Allergen Reactions     No Known Drug Allergy         Social History:  Social History     Tobacco Use     Smoking status: Never     Smokeless tobacco: Never   Vaping Use     Vaping Use: Never used   Substance Use Topics     Alcohol use: Yes     Comment: Less than 4 servings (drinks) per month     Drug use: No       ROS: 10 point ROS neg other than the symptoms noted above in the HPI.    Physical Exam:    Ht 1.651 m (5' 5\")   Wt 87.5 kg (193 lb)   LMP 02/21/2021 (LMP Unknown)   BMI 32.12 kg/m       Constitutional:  The patient was unaccompanied, well-groomed, and in no acute distress.     Skin: Normal:  warm and pink without rash    Neurologic: Alert and oriented x 3.  CN's III-XII within normal limits.  Voice normal.    Psychiatric: The patient's affect was calm, cooperative, and appropriate.     Communication:  Normal; communicates verbally, normal " voice quality.    Respiratory: Breathing comfortably without stridor or exertion of accessory muscles.    Ears: Pinnae and tragus non-tender.  EAC's and TM's were clear.        Audiogram: 12/4/2023 - data independently reviewed  Normal hearing bilaterally  WR right: 100% left: 96% at 50 dB  Acoustic Reflexes: Present in all conditions  Tympanograms: type A bilaterally       Assessment and Plan:  1. Tinnitus, bilateral  Patient with 2 year history of bilateral tinnitus.  Reviewed audiogram today with patient which shows normal hearing bilaterally but there is a beginning of a high frequency decline. Although patient does have hearing in the normal range, even this decline may be contributing to tinnitus symptoms and hyperacusis. Explained to patient that tinnitus is a central process, meaning that it is a brain generated noise.  Reviewed other factors that can contribute to tinnitus including stress, anxiety, lack of sleep, and neck/muscle tension.     Explained to patient that there is no specific medication or procedure that can eliminate tinnitus, however, there are evidence-based management strategies that can be used to improve symptoms.  Discussed management strategies with patient including tinnitus masking and cognitive behavioral approaches.  Many people with tinnitus find improvement of symptoms with these management strategies but this can take some time and work from patient to adopt these strategies for management.      Recommend that patient continue tinnitus management strategies discussed above.     Patient can return to clinic as needed or in 1 year to monitor hearing.    Hortencia Raya DNP, APRN, CNP  Otolaryngology  Head & Neck Surgery  [phone]    45 minutes spent by me on the date of the encounter doing chart review, history and exam, documentation and further activities per the note

## 2023-12-04 NOTE — PROGRESS NOTES
AUDIOLOGY REPORT    SUMMARY: Audiology visit completed. See audiogram for results.      RECOMMENDATIONS: Follow-up with ENT.      Oliverio Vasques  Audiologist  [place] License  #[id number]

## 2023-12-04 NOTE — LETTER
12/4/2023       RE: Joan Lam  [address] [facility] [id number]     Dear Colleague,    Thank you for referring your patient, Joan Lam, to the [facility] EAR NOSE AND THROAT CLINIC [place] at [facility]. Please see a copy of my visit note below.      Otolaryngology Clinic  December 4, 2023    Chief Complaint:   Bilateral tinnitus       History of Present Illness:   Joan Lam is a 58 year old female who presents today for evaluation of bilateral tinnitus.  Patient reports an onset of bilateral tinnitus in 2021.  Also reports increased sensitivity to certain noises.  Patient reports that tinnitus is a constant bilateral tone that does not pulsate.  Most bothersome in quiet environments but does not affect ability for patient to fall asleep.  Reports that tinnitus symptoms seem to worsen after a cold last May that included significant sinus congestion and headaches.  Reports bothersome headaches that seem to occur every night when going to bed.  Patient does use saline spray to treat nasal drainage and has used Flonase in the past for allergies.  Patient has a history of BPPV last episode was in 2019.  Reports a family history of hearing loss on her father side.    Patient denies any otalgia, otorrhea, hearing loss, facial numbness/weakness, history of frequent ear infections, or ear surgeries.     Past Medical History:  Past Medical History:   Diagnosis Date    Allergic rhinitis 08/11/2005    Anxiety disorder     Has Panic Attacks    Cervical high risk HPV (human papillomavirus) test positive 01/23/2019 01/23/2019, 02/05/20, 03/3/21    Depressive disorder 1992    and NATALIE. Taking Fluoxetine (40mg)    History of prolactinoma 2005    Dr. Fischer, [facility]    Hypertension 12/2016    Hypothyroidism 2014       Past Surgical History:  Past Surgical History:   Procedure Laterality Date    BIOPSY  mid-90's    from cervix  "after abnormal Pap. Benign.    BUNIONECTOMY RT/LT  2005    Right Foot    BUNIONECTOMY RT/LT  2006    Left Foot     COLONOSCOPY  January 15, 2018    1 polyp - benign. Next exam in 10 years.       Medications:  Current Outpatient Medications   Medication Sig Dispense Refill    amLODIPine (NORVASC) 5 MG tablet TAKE 1 TABLET BY MOUTH DAILY 90 tablet 3    buPROPion (WELLBUTRIN XL) 300 MG 24 hr tablet Take 1 tablet (300 mg) by mouth every morning      cetirizine (ZYRTEC) 10 MG tablet Take 10 mg by mouth daily      cholecalciferol (VITAMIN D3) 1000 UNIT tablet Take 3 tablets (3,000 Units) by mouth daily      FLUoxetine (PROZAC) 10 MG capsule Take 3 capsules (30 mg) by mouth daily 270 capsule 1    fluticasone (FLONASE) 50 MCG/ACT nasal spray Spray 2 sprays into both nostrils daily      levothyroxine (SYNTHROID/LEVOTHROID) 100 MCG tablet Take 1 tablet (100 mcg) by mouth daily 90 tablet 3    LORazepam (ATIVAN) 0.5 MG tablet TAKE 1 TABLET(0.5 MG) BY MOUTH DAILY AS NEEDED FOR ANXIETY 30 tablet 1    progesterone (PROMETRIUM) 100 MG capsule Take 2 capsules (200 mg) by mouth daily 180 capsule 1    desonide (DESOWEN) 0.05 % external ointment Apply topically 2 times daily as needed (to affected area) up to one week at a time. (Patient not taking: Reported on 6/29/2023) 15 g 1    VALERIAN ROOT PO Take 1,000 mg by mouth (Patient not taking: Reported on 6/29/2023)         Allergies:  Allergies   Allergen Reactions    No Known Drug Allergy         Social History:  Social History     Tobacco Use    Smoking status: Never    Smokeless tobacco: Never   Vaping Use    Vaping Use: Never used   Substance Use Topics    Alcohol use: Yes     Comment: Less than 4 servings (drinks) per month    Drug use: No       ROS: 10 point ROS neg other than the symptoms noted above in the HPI.    Physical Exam:    Ht 1.651 m (5' 5\")   Wt 87.5 kg (193 lb)   LMP 02/21/2021 (LMP Unknown)   BMI 32.12 kg/m       Constitutional:  The patient was unaccompanied, " well-groomed, and in no acute distress.     Skin: Normal:  warm and pink without rash    Neurologic: Alert and oriented x 3.  CN's III-XII within normal limits.  Voice normal.    Psychiatric: The patient's affect was calm, cooperative, and appropriate.     Communication:  Normal; communicates verbally, normal voice quality.    Respiratory: Breathing comfortably without stridor or exertion of accessory muscles.    Ears: Pinnae and tragus non-tender.  EAC's and TM's were clear.        Audiogram: 12/4/2023 - data independently reviewed  Normal hearing bilaterally  WR right: 100% left: 96% at 50 dB  Acoustic Reflexes: Present in all conditions  Tympanograms: type A bilaterally       Assessment and Plan:  1. Tinnitus, bilateral  Patient with 2 year history of bilateral tinnitus.  Reviewed audiogram today with patient which shows normal hearing bilaterally but there is a beginning of a high frequency decline. Although patient does have hearing in the normal range, even this decline may be contributing to tinnitus symptoms and hyperacusis. Explained to patient that tinnitus is a central process, meaning that it is a brain generated noise.  Reviewed other factors that can contribute to tinnitus including stress, anxiety, lack of sleep, and neck/muscle tension.     Explained to patient that there is no specific medication or procedure that can eliminate tinnitus, however, there are evidence-based management strategies that can be used to improve symptoms.  Discussed management strategies with patient including tinnitus masking and cognitive behavioral approaches.  Many people with tinnitus find improvement of symptoms with these management strategies but this can take some time and work from patient to adopt these strategies for management.      Recommend that patient continue tinnitus management strategies discussed above.     Patient can return to clinic as needed or in 1 year to monitor hearing.    Hortencia Raya DNP, APRN,  [facility]  Otolaryngology  Head & Neck Surgery  [phone]    45 minutes spent by me on the date of the encounter doing chart review, history and exam, documentation and further activities per the note

## 2024-01-05 DIAGNOSIS — N85.01 ENDOMETRIAL HYPERPLASIA, SIMPLE: ICD-10-CM

## 2024-01-17 ENCOUNTER — MYC REFILL (OUTPATIENT)
Dept: FAMILY MEDICINE | Facility: CLINIC | Age: 59
End: 2024-01-17
Payer: COMMERCIAL

## 2024-01-17 ENCOUNTER — MYC REFILL (OUTPATIENT)
Dept: OBGYN | Facility: CLINIC | Age: 59
End: 2024-01-17
Payer: COMMERCIAL

## 2024-01-17 DIAGNOSIS — E03.9 HYPOTHYROIDISM, UNSPECIFIED TYPE: ICD-10-CM

## 2024-01-17 DIAGNOSIS — N85.01 ENDOMETRIAL HYPERPLASIA, SIMPLE: ICD-10-CM

## 2024-01-17 DIAGNOSIS — H01.139 ECZEMA OF EYELID, UNSPECIFIED LATERALITY: ICD-10-CM

## 2024-01-17 RX ORDER — LEVOTHYROXINE SODIUM 100 UG/1
100 TABLET ORAL DAILY
Qty: 90 TABLET | Refills: 2 | Status: SHIPPED | OUTPATIENT
Start: 2024-01-17 | End: 2024-02-27

## 2024-01-17 RX ORDER — DESONIDE 0.5 MG/G
OINTMENT TOPICAL 2 TIMES DAILY PRN
Qty: 15 G | Refills: 1 | Status: SHIPPED | OUTPATIENT
Start: 2024-01-17

## 2024-01-17 RX ORDER — PROGESTERONE 100 MG/1
200 CAPSULE ORAL DAILY
Qty: 180 CAPSULE | Refills: 1 | Status: SHIPPED | OUTPATIENT
Start: 2024-01-17 | End: 2024-05-16

## 2024-02-22 ENCOUNTER — PATIENT OUTREACH (OUTPATIENT)
Dept: CARE COORDINATION | Facility: CLINIC | Age: 59
End: 2024-02-22
Payer: COMMERCIAL

## 2024-02-27 DIAGNOSIS — E03.9 HYPOTHYROIDISM, UNSPECIFIED TYPE: ICD-10-CM

## 2024-02-27 DIAGNOSIS — F33.42 MAJOR DEPRESSIVE DISORDER, RECURRENT EPISODE, IN FULL REMISSION (H): ICD-10-CM

## 2024-02-27 DIAGNOSIS — F41.1 GAD (GENERALIZED ANXIETY DISORDER): ICD-10-CM

## 2024-02-27 RX ORDER — FLUOXETINE 10 MG/1
30 CAPSULE ORAL DAILY
Qty: 270 CAPSULE | Refills: 1 | Status: CANCELLED | OUTPATIENT
Start: 2024-02-27

## 2024-02-27 RX ORDER — FLUOXETINE 10 MG/1
30 CAPSULE ORAL DAILY
Qty: 270 CAPSULE | Refills: 0 | Status: SHIPPED | OUTPATIENT
Start: 2024-02-27 | End: 2024-05-06

## 2024-02-27 RX ORDER — LEVOTHYROXINE SODIUM 100 UG/1
100 TABLET ORAL DAILY
Qty: 90 TABLET | Refills: 2 | Status: CANCELLED | OUTPATIENT
Start: 2024-02-27

## 2024-02-27 RX ORDER — LEVOTHYROXINE SODIUM 100 UG/1
100 TABLET ORAL DAILY
Qty: 90 TABLET | Refills: 1 | Status: SHIPPED | OUTPATIENT
Start: 2024-02-27 | End: 2024-07-23

## 2024-03-21 ENCOUNTER — PATIENT OUTREACH (OUTPATIENT)
Dept: CARE COORDINATION | Facility: CLINIC | Age: 59
End: 2024-03-21
Payer: COMMERCIAL

## 2024-03-28 ENCOUNTER — HOSPITAL ENCOUNTER (OUTPATIENT)
Dept: MAMMOGRAPHY | Facility: CLINIC | Age: 59
Discharge: HOME | End: 2024-03-28
Attending: FAMILY MEDICINE | Admitting: FAMILY MEDICINE
Payer: COMMERCIAL

## 2024-03-28 DIAGNOSIS — Z12.31 VISIT FOR SCREENING MAMMOGRAM: ICD-10-CM

## 2024-03-28 PROCEDURE — 77063 BREAST TOMOSYNTHESIS BI: CPT

## 2024-04-02 ENCOUNTER — PRE VISIT (OUTPATIENT)
Dept: PLASTIC SURGERY | Facility: CLINIC | Age: 59
End: 2024-04-02
Payer: COMMERCIAL

## 2024-04-04 RX ORDER — PROGESTERONE 100 MG/1
200 CAPSULE ORAL DAILY
Qty: 180 CAPSULE | Refills: 3 | OUTPATIENT
Start: 2024-04-04

## 2024-05-04 DIAGNOSIS — F41.1 GAD (GENERALIZED ANXIETY DISORDER): ICD-10-CM

## 2024-05-04 DIAGNOSIS — F33.42 MAJOR DEPRESSIVE DISORDER, RECURRENT EPISODE, IN FULL REMISSION (H): ICD-10-CM

## 2024-05-06 RX ORDER — FLUOXETINE 10 MG/1
30 CAPSULE ORAL DAILY
Qty: 270 CAPSULE | Refills: 0 | Status: SHIPPED | OUTPATIENT
Start: 2024-05-06 | End: 2024-05-16

## 2024-05-14 ENCOUNTER — MYC MEDICAL ADVICE (OUTPATIENT)
Dept: FAMILY MEDICINE | Facility: CLINIC | Age: 59
End: 2024-05-14
Payer: COMMERCIAL

## 2024-05-14 SDOH — HEALTH STABILITY: PHYSICAL HEALTH: ON AVERAGE, HOW MANY MINUTES DO YOU ENGAGE IN EXERCISE AT THIS LEVEL?: 20 MIN

## 2024-05-14 SDOH — HEALTH STABILITY: PHYSICAL HEALTH: ON AVERAGE, HOW MANY DAYS PER WEEK DO YOU ENGAGE IN MODERATE TO STRENUOUS EXERCISE (LIKE A BRISK WALK)?: 3 DAYS

## 2024-05-14 ASSESSMENT — SOCIAL DETERMINANTS OF HEALTH (SDOH): HOW OFTEN DO YOU GET TOGETHER WITH FRIENDS OR RELATIVES?: TWICE A WEEK

## 2024-05-14 NOTE — TELEPHONE ENCOUNTER
Message sent via Altia.    Dilma García, RN, BSN, PHN  [facility]  [phone]

## 2024-05-14 NOTE — COMMUNITY RESOURCES LIST (ENGLISH)
May 14, 2024           YOUR PERSONALIZED LIST OF SERVICES & PROGRAMS           & SHELTER    Housing      Serving People - Shelter for families  [address] [place] [address] (Distance: 2.9 miles)  Phone: [phone]  Language: English  Fee: Free      [facility] Hotline  [address] [facility] [address]th [place] [address] (Distance: 2.7 miles)  Phone: [phone]  Language: English  Accessibility: Translation services      [facility]  Phone: [phone]  Website: https://www.[employer] [facility] CenterSuksh Tech./  Language: English, Hmong, Oromo, Sao Tomean, Iranian  Hours: Mon 9:00 AM - 5:00 PM Tue 9:00 AM - 5:00 PM Wed 9:00 AM - 5:00 PM Thu 9:00 AM - 5:00 PM Fri 9:00 AM - 5:00 PM  Fee: Insurance  Accessibility: Blind accommodation, Deaf or hard of hearing, Translation services  Transportation Options: Free transportation    Case Management      [facility] With Services Independent  [address] [place] [address] (Distance: 5.4 miles)  Phone: [phone]  Website: https://www.My COI.China Wi Max/contact  Language: English, Iranian  Accessibility: Ada accessible, Blind accommodation, Deaf or hard of hearing, Translation services      Living - Housing Support-[facility] (HWS-I)  [address] [place] [address] (Distance: 2.4 miles)  Phone: [phone]  Website: https://JumpIn.[employer]  Language: Vietnamese, English, Sao Tomean  Fee: Insurance      [employer] - Housing Stabilization Services  Phone: [phone]  Website: https://homebasemn.com/  Language: English  Hours: Mon 8:00 AM - 4:00 PM Tue 8:00 AM - 4:00 PM Wed 8:00 AM - 4:00 PM Thu 8:00 AM - 4:00 PM Fri 8:00 AM - 4:00 PM  Fee: Free  Accessibility: Blind accommodation, Deaf or hard of hearing  Transportation Options: Free transportation    Drop-In Services      Incorporated - Drop-in center or day shelter  [address] [place] [address]  (Distance: 4.6 miles)  Phone: [phone]  Language: English  Fee: Free      [employer]. - Drop-in center or day shelter  [address] [place] [address] (Distance: 1.7 miles)  Phone: [phone]  Language: English  Fee: Free  Accessibility: Ada accessible, Translation services      LOVE - LAUNDRY LOVE  Website: http://www.laundrylove.org               IMPORTANT NUMBERS & WEBSITES        Emergency Services  911  .   United Way  211 http://211unitedway.org  .   Poison Control  (310) 812-4271 http://[place]poison.org http://wisconsinpoison.org  .     Suicide and Crisis Lifeline  988 http://988Vineline.org  .   Childhelp [place] Child Abuse Hotline  563.113.2437 http://Childhelphotline.org   .   [place] Sexual Assault Hotline  (779) 588-5702 (HOPE) http://Keniun.org   .     [place] Runaway Safeline  (380) 862-9481 (RUNAWAY) http://Polarion Software.Tatango  .   Pregnancy & Postpartum Support  Call/text 621-266-7333  [place]: http://ppsupportmn.org  [place]: http://Kapow Software.com/wi  .   Substance Abuse National Helpline ([facility])  361-150-HELP (6614) http://Findtreatment.gov   .                DISCLAIMER: These resources have been generated via the StudyTube Platform. StudyTube does not endorse any service providers mentioned in this resource list. StudyTube does not guarantee that the services mentioned in this resource list will be available to you or will improve your health or wellness.    [facility]

## 2024-05-16 ENCOUNTER — OFFICE VISIT (OUTPATIENT)
Dept: FAMILY MEDICINE | Facility: CLINIC | Age: 59
End: 2024-05-16
Attending: FAMILY MEDICINE
Payer: COMMERCIAL

## 2024-05-16 VITALS
WEIGHT: 189 LBS | TEMPERATURE: 97.3 F | RESPIRATION RATE: 16 BRPM | HEIGHT: 64 IN | SYSTOLIC BLOOD PRESSURE: 130 MMHG | HEART RATE: 83 BPM | BODY MASS INDEX: 32.27 KG/M2 | OXYGEN SATURATION: 98 % | DIASTOLIC BLOOD PRESSURE: 90 MMHG

## 2024-05-16 DIAGNOSIS — R87.810 CERVICAL HIGH RISK HPV (HUMAN PAPILLOMAVIRUS) TEST POSITIVE: ICD-10-CM

## 2024-05-16 DIAGNOSIS — I10 ESSENTIAL HYPERTENSION WITH GOAL BLOOD PRESSURE LESS THAN 140/90: ICD-10-CM

## 2024-05-16 DIAGNOSIS — Z00.00 ROUTINE GENERAL MEDICAL EXAMINATION AT A HEALTH CARE FACILITY: Primary | ICD-10-CM

## 2024-05-16 DIAGNOSIS — D35.2 PROLACTINOMA (H): ICD-10-CM

## 2024-05-16 DIAGNOSIS — F33.42 MAJOR DEPRESSIVE DISORDER, RECURRENT EPISODE, IN FULL REMISSION (H): ICD-10-CM

## 2024-05-16 DIAGNOSIS — Z80.3 FAMILY HISTORY OF BREAST CANCER: ICD-10-CM

## 2024-05-16 DIAGNOSIS — N85.01 ENDOMETRIAL HYPERPLASIA, SIMPLE: ICD-10-CM

## 2024-05-16 DIAGNOSIS — F41.1 GAD (GENERALIZED ANXIETY DISORDER): ICD-10-CM

## 2024-05-16 DIAGNOSIS — Z23 NEED FOR VACCINATION: ICD-10-CM

## 2024-05-16 DIAGNOSIS — Z80.0 FAMILY HISTORY OF COLON CANCER: ICD-10-CM

## 2024-05-16 PROCEDURE — 96127 BRIEF EMOTIONAL/BEHAV ASSMT: CPT | Performed by: FAMILY MEDICINE

## 2024-05-16 PROCEDURE — 80048 BASIC METABOLIC PNL TOTAL CA: CPT | Performed by: FAMILY MEDICINE

## 2024-05-16 PROCEDURE — G0145 SCR C/V CYTO,THINLAYER,RESCR: HCPCS | Performed by: FAMILY MEDICINE

## 2024-05-16 PROCEDURE — 90471 IMMUNIZATION ADMIN: CPT | Performed by: FAMILY MEDICINE

## 2024-05-16 PROCEDURE — 87624 HPV HI-RISK TYP POOLED RSLT: CPT | Performed by: FAMILY MEDICINE

## 2024-05-16 PROCEDURE — 90636 HEP A/HEP B VACC ADULT IM: CPT | Performed by: FAMILY MEDICINE

## 2024-05-16 PROCEDURE — 36415 COLL VENOUS BLD VENIPUNCTURE: CPT | Performed by: FAMILY MEDICINE

## 2024-05-16 PROCEDURE — 99214 OFFICE O/P EST MOD 30 MIN: CPT | Mod: 25 | Performed by: FAMILY MEDICINE

## 2024-05-16 PROCEDURE — 99396 PREV VISIT EST AGE 40-64: CPT | Mod: 25 | Performed by: FAMILY MEDICINE

## 2024-05-16 RX ORDER — TOPIRAMATE 50 MG/1
TABLET, FILM COATED ORAL
COMMUNITY
Start: 2024-03-08 | End: 2024-07-09

## 2024-05-16 RX ORDER — PROGESTERONE 100 MG/1
200 CAPSULE ORAL DAILY
Qty: 180 CAPSULE | Refills: 1 | Status: SHIPPED | OUTPATIENT
Start: 2024-05-16 | End: 2024-07-09

## 2024-05-16 RX ORDER — FLUOXETINE 10 MG/1
30 CAPSULE ORAL DAILY
Qty: 270 CAPSULE | Refills: 1 | Status: SHIPPED | OUTPATIENT
Start: 2024-05-16

## 2024-05-16 ASSESSMENT — PATIENT HEALTH QUESTIONNAIRE - PHQ9
SUM OF ALL RESPONSES TO PHQ QUESTIONS 1-9: 0
10. IF YOU CHECKED OFF ANY PROBLEMS, HOW DIFFICULT HAVE THESE PROBLEMS MADE IT FOR YOU TO DO YOUR WORK, TAKE CARE OF THINGS AT HOME, OR GET ALONG WITH OTHER PEOPLE: NOT DIFFICULT AT ALL
SUM OF ALL RESPONSES TO PHQ QUESTIONS 1-9: 0

## 2024-05-16 NOTE — PATIENT INSTRUCTIONS
"Schedule follow-up with GYN MD - office visit for follow-up of endometrial hyperplasia.  Dr. Deal and Dr. Carlin and Dr. Romo are at [facility] on Tuesdays, Thursdays, and Fridays.    If you have MyChart:  1) I kindly request that you check your MyChart prior to all appointments with me and complete any assigned questionnaires ahead of time.    2) You may receive auto-released results from our system before I have the opportunity to review and comment.  Be assured I will review and comment on all of your results as soon as I can.      FYI:  My schedule has been booking out very far in advance (2 months).  I apologize for the lack of timely access.  If you need to be seen for a chronic condition or preventive (wellness) visit, please be sure to schedule that appointment 2-3 months in advance.  If you have a concern that you feel cannot wait until my next available appointment (such as a hospital follow-up, pre-op, or new symptom of concern) please call clinic at [phone] and say \"my care team.\"  If you are still told that I have no availability please ask to speak to a [facility]  or Nurse who can often offer you an appointment with me within a week or so.  Or you can send me a [email or web address] message.  The trick is to avoid speaking with central scheduling.      Patient Education     Preventive Care Advice   This is general advice we often give to help people stay healthy. Your care team may have specific advice just for you. Please talk to your care team about your own preventive care needs.  Lifestyle  Exercise at least 150 minutes each week (30 minutes a day, 5 days a week).  Do muscle strengthening activities 2 days a week. These help control your weight and prevent disease.  No smoking.  Wear sunscreen to prevent skin cancer.  Have your home tested for radon every 2 to 5 years. Radon is a colorless, odorless gas that can harm your lungs. To learn more, go to " "www.health.[facility].mn. and search for \"Radon in Homes.\"  Keep guns unloaded and locked up in a safe place like a safe or gun vault, or, use a gun lock and hide the keys. Always lock away bullets separately. To learn more, visit [facility].mn.gov and search for \"safe gun storage.\"  Nutrition  Eat 5 or more servings of fruits and vegetables each day.  Try wheat bread, brown rice and whole grain pasta (instead of white bread, rice, and pasta).  Get enough calcium and vitamin D. Check the label on foods and aim for 100% of the RDA (recommended daily allowance).  Regular exams  Have a dental exam and cleaning every 6 months.  See your health care team every year to talk about:  Any changes in your health.  Any medicines your care team has prescribed.  Preventive care, family planning, and ways to prevent chronic diseases.  Shots (vaccines)   HPV shots (up to age 26), if you've never had them before.  Hepatitis B shots (up to age 59), if you've never had them before.  COVID-19 shot: Get this shot when it's due.  Flu shot: Get a flu shot every year.  Tetanus shot: Get a tetanus shot every 10 years.  Pneumococcal, hepatitis A, and RSV shots: Ask your care team if you need these based on your risk.  Shingles shot (for age 50 and up).  General health tests  Diabetes screening:  Starting at age 35, Get screened for diabetes at least every 3 years.  If you are younger than age 35, ask your care team if you should be screened for diabetes.  Cholesterol test: At age 39, start having a cholesterol test every 5 years, or more often if advised.  Bone density scan (DEXA): At age 50, ask your care team if you should have this scan for osteoporosis (brittle bones).  Hepatitis C: Get tested at least once in your life.  Abdominal aortic aneurysm screening: Talk to your doctor about having this screening if you:  Have ever smoked; and  Are biologically male; and  Are between the ages of 65 and 75.  STIs (sexually transmitted infections)  Before " age 24: Ask your care team if you should be screened for STIs.  After age 24: Get screened for STIs if you're at risk. You are at risk for STIs (including HIV) if:  You are sexually active with more than one person.  You don't use condoms every time.  You or a partner was diagnosed with a sexually transmitted infection.  If you are at risk for HIV, ask about PrEP medicine to prevent HIV.  Get tested for HIV at least once in your life, whether you are at risk for HIV or not.  Cancer screening tests  Cervical cancer screening: If you have a cervix, begin getting regular cervical cancer screening tests at age 21. Most people who have regular screenings with normal results can stop after age 65. Talk about this with your provider.  Breast cancer scan (mammogram): If you've ever had breasts, begin having regular mammograms starting at age 40. This is a scan to check for breast cancer.  Colon cancer screening: It is important to start screening for colon cancer at age 45.  Have a colonoscopy test every 10 years (or more often if you're at risk) Or, ask your provider about stool tests like a FIT test every year or Cologuard test every 3 years.  To learn more about your testing options, visit: www.Sentillion/542857.pdf.  For help making a decision, visit: hernesto/qb60924.  Prostate cancer screening test: If you have a prostate and are age 55 to 69, ask your provider if you would benefit from a yearly prostate cancer screening test.  Lung cancer screening: If you are a current or former smoker age 50 to 80, ask your care team if ongoing lung cancer screenings are right for you.  For informational purposes only. Not to replace the advice of your health care provider. Copyright   2023 [employer]. All rights reserved. Clinically reviewed by the [facility] Transitions Program. [employer] [id number] - REV 04/24.

## 2024-05-16 NOTE — PROGRESS NOTES
"Preventive Care Visit  [facility]  Doreen Aguila MD, Family Medicine  May 16, 2024      Assessment & Plan     Routine general medical examination at a health care facility  Reviewed/updated Health Maintenance     Cervical high risk HPV (human papillomavirus) test positive  - Pap Diagnostic with HPV    Endometrial hyperplasia, simple  She reports no vaginal bleeding in 2.5 years. I recommended she follow-up with GYN for further management: EMB vs decrease dose or discontinuation of prometrium   - progesterone (PROMETRIUM) 100 MG capsule  Dispense: 180 capsule; Refill: 1    NATALIE (generalized anxiety disorder)  Major depressive disorder, recurrent episode, in full remission (H24)  stable/well controlled - .ccmr   - FLUoxetine (PROZAC) 10 MG capsule  Dispense: 270 capsule; Refill: 1    Essential hypertension with goal blood pressure less than 140/90  Sub-optimal control in patient who notes daily use of NSAID.  I encouraged her to switch to acetaminophen and avoid using any analgesic more than 3 days per week.  Discussed that the regular (daily) use of analgesics can further exacerbate headaches.    - Basic metabolic panel  (Ca, Cl, CO2, Creat, Gluc, K, Na, BUN)    Prolactinoma (H)  Managed by [facility] and she is due for next follow-up with Dr. Townsend in Jan 2025.     Family history of breast cancer  Family history of colon cancer  Referred to cancer risk management clinic  - Adult Oncology/Hematology  Referral    Need for vaccination  - HEP A & B ([place])         BMI  Estimated body mass index is 32.27 kg/m  as calculated from the following:    Height as of this encounter: 1.63 m (5' 4.17\").    Weight as of this encounter: 85.7 kg (189 lb).   Weight management plan: she has started bupropion and topiramate through her workplace weight management clinic    Counseling  Appropriate preventive services were discussed with this patient, including applicable screening as appropriate for " fall prevention, nutrition, physical activity, Tobacco-use cessation, weight loss and cognition.  Checklist reviewing preventive services available has been given to the patient.  Reviewed patient's diet, addressing concerns and/or questions.   She is at risk for lack of exercise and has been provided with information to increase physical activity for the benefit of her well-being.     See Patient Instructions        Monica Franco is a 59 year old, presenting for the following:  Physical        5/16/2024     2:25 PM   Additional Questions   Roomed by CHIVO RN        Health Care Directive  Patient does not have a Health Care Directive or Living Will: Patient states has Advance Directive and will bring in a copy to clinic.    HPI    Depression and Anxiety Follow-Up         6/29/2023     8:31 AM 11/21/2023     8:47 AM 5/16/2024     2:12 PM   PHQ   PHQ-9 Total Score 1 1 0   Q9: Thoughts of better off dead/self-harm past 2 weeks Not at all Not at all Not at all         9/13/2022     8:02 AM 6/29/2023     8:31 AM 11/21/2023     8:48 AM   NATALIE-7 SCORE   Total Score 1 (minimal anxiety)  4 (minimal anxiety)   Total Score 1 3 4       HTN Follow-Up   Has been using ibuprofen daily for headaches.  Tends to develop headache at the end of the day and feels she needs an analgesic to get to sleep.          5/14/2024   General Health   How would you rate your overall physical health? Good   Feel stress (tense, anxious, or unable to sleep) Not at all         5/14/2024   Nutrition   Three or more servings of calcium each day? Yes   Diet: Low fat/cholesterol   How many servings of fruit and vegetables per day? (!) 2-3   How many sweetened beverages each day? 0-1         5/14/2024   Exercise   Days per week of moderate/strenous exercise 3 days   Average minutes spent exercising at this level 20 min         5/14/2024   Social Factors   Frequency of gathering with friends or relatives Twice a week   Worry food won't last until get  money to buy more No   Food not last or not have enough money for food? No   Do you have housing?  No   Are you worried about losing your housing? No   Lack of transportation? No   Unable to get utilities (heat,electricity)? No   Want help with housing or utility concern? No   (!) HOUSING CONCERN PRESENT      5/14/2024   Fall Risk   Fallen 2 or more times in the past year? No   Trouble with walking or balance? No          5/14/2024   Dental   Dentist two times every year? Yes         5/14/2024   TB Screening   Were you born outside of the [place]? No       Today's PHQ-9 Score:       5/16/2024     2:12 PM   PHQ-9 SCORE   PHQ-9 Total Score MyChart 0   PHQ-9 Total Score 0         5/14/2024   Substance Use   Alcohol more than 3/day or more than 7/wk No   Do you use any other substances recreationally? (!) CANNABIS PRODUCTS     Social History     Tobacco Use    Smoking status: Never    Smokeless tobacco: Never   Vaping Use    Vaping status: Never Used   Substance Use Topics    Alcohol use: Yes     Comment: Less than 4 servings (drinks) per month    Drug use: No           3/28/2024   LAST FHS-7 RESULTS   1st degree relative breast or ovarian cancer Yes   Any relative bilateral breast cancer No   Any male have breast cancer No   2 or more relatives with breast AND/OR ovarian cancer Yes   2 or more relatives with breast AND/OR bowel cancer Yes        Mammogram Screening - Mammogram every 1-2 years updated in Health Maintenance based on mutual decision making        5/14/2024   STI Screening   New sexual partner(s) since last STI/HIV test? No     History of abnormal Pap smear: YES - reflected in Problem List and Health Maintenance accordingly        Latest Ref Rng & Units 6/29/2023     8:56 AM 4/21/2022     8:51 AM 3/3/2021    10:30 AM   PAP / HPV   PAP  Negative for Intraepithelial Lesion or Malignancy (NILM)  Negative for Intraepithelial Lesion or Malignancy (NILM)     HPV 16 DNA Negative Negative  Negative  Negative    HPV 18  "DNA Negative Negative  Negative  Negative    Other HR HPV Negative Positive  Positive  Positive      ASCVD Risk   The 10-year ASCVD risk score (Brandon FALL, et al., 2019) is: 3.7%    Values used to calculate the score:      Age: 59 years      Sex: Female      Is Non- : No      Diabetic: No      Tobacco smoker: No      Systolic Blood Pressure: 130 mmHg      Is BP treated: Yes      HDL Cholesterol: 34 mg/dL      Total Cholesterol: 143 mg/dL       Reviewed and updated as needed this visit by Provider   Tobacco  Allergies  Meds  Problems  Med Hx  Surg Hx  Fam Hx            BP Readings from Last 3 Encounters:   05/16/24 (!) 130/90   12/04/23 (!) 149/93   11/21/23 127/85    Wt Readings from Last 3 Encounters:   05/16/24 85.7 kg (189 lb)   12/04/23 87.5 kg (193 lb)   11/21/23 85.7 kg (189 lb)                    Objective    Exam  BP (!) 130/90 (BP Location: Right arm, Patient Position: Sitting, Cuff Size: Adult Large)   Pulse 83   Temp 97.3  F (36.3  C) (Temporal)   Resp 16   Ht 1.63 m (5' 4.17\")   Wt 85.7 kg (189 lb)   LMP 02/21/2021 (LMP Unknown)   SpO2 98%   BMI 32.27 kg/m     Estimated body mass index is 32.27 kg/m  as calculated from the following:    Height as of this encounter: 1.63 m (5' 4.17\").    Weight as of this encounter: 85.7 kg (189 lb).    Physical Exam  GENERAL: healthy, alert and no distress  EYES: Eyes grossly normal to inspection, conjunctivae and sclerae normal  HENT: ear canals and TM's normal  NECK: no adenopathy, no asymmetry, masses, or scars and thyroid normal to palpation  RESP: lungs clear to auscultation - no rales, rhonchi or wheezes  CV: regular rate and rhythm, normal S1 S2, no S3 or S4, no murmur, click or rub  ABDOMEN: soft, nontender, no hepatosplenomegaly, no masses  :  vulva, vagina, and cervix are normal in appearance and pap smear was obtained   MS: no gross musculoskeletal defects noted, no edema  SKIN: no suspicious lesions or " mk  NEURO: Grossly normal strength and tone, mentation intact and speech normal  PSYCH: mentation appears normal, affect normal/bright        Signed Electronically by: Doreen Aguila MD

## 2024-05-17 ENCOUNTER — PATIENT OUTREACH (OUTPATIENT)
Dept: ONCOLOGY | Facility: CLINIC | Age: 59
End: 2024-05-17
Payer: COMMERCIAL

## 2024-05-17 LAB
ANION GAP SERPL CALCULATED.3IONS-SCNC: 11 MMOL/L (ref 7–15)
BUN SERPL-MCNC: 17.1 MG/DL (ref 6–20)
CALCIUM SERPL-MCNC: 9.4 MG/DL (ref 8.6–10)
CHLORIDE SERPL-SCNC: 108 MMOL/L (ref 98–107)
CREAT SERPL-MCNC: 1.08 MG/DL (ref 0.51–0.95)
DEPRECATED HCO3 PLAS-SCNC: 23 MMOL/L (ref 22–29)
EGFRCR SERPLBLD CKD-EPI 2021: 60 ML/MIN/1.73M2
GLUCOSE SERPL-MCNC: 91 MG/DL (ref 70–99)
POTASSIUM SERPL-SCNC: 4.3 MMOL/L (ref 3.4–5.3)
SODIUM SERPL-SCNC: 142 MMOL/L (ref 135–145)

## 2024-05-18 LAB
HPV HR 12 DNA CVX QL NAA+PROBE: POSITIVE
HPV16 DNA CVX QL NAA+PROBE: NEGATIVE
HPV18 DNA CVX QL NAA+PROBE: NEGATIVE
HUMAN PAPILLOMA VIRUS FINAL DIAGNOSIS: ABNORMAL

## 2024-05-19 NOTE — RESULT ENCOUNTER NOTE
Twan Franco,  Your basic metabolic panel results (blood salts, blood sugar, and kidney function) show that your kidney function (creatinine and eGFR) has definitely worsened over the past year and a half.  Your creatinine used to run around 0.7 and now it is 1.08 while your GFR has dropped from > 90 (normal) to 60.  I think this is likely related to your daily use of ibuprofen.  Hopefully that drop in kidney function will reverse if you stop the ibuprofen.  I placed a future lab order for a repeat BMP.  Please schedule a lab-only appointment in 3 months.    Keeping blood pressure and blood sugars controlled helps keep the kidneys healthy.  I also recommend staying hydrated and avoiding frequent use of over-the-counter NSAID medications (ibuprofen, naproxen, advil, motrin, aleve).   Doreen Aguila MD

## 2024-05-21 NOTE — PROGRESS NOTES
5/22/2024    Virtual Visit Details  Type of service:  Video Visit   Originating Location (pt. Location): Home  Distant Location (provider location):  Off-site  Platform used for Video Visit: Mary    Referring Provider: Doreen Aguila MD     Presenting Information:   I met with Jaon Lam today for her video genetic counseling visit through the Cancer Risk Management Program to discuss her family history of breast, colon, and prostate cancer. She is here today to review this history, cancer screening recommendations, and available genetic testing options.    Personal History:  Joan is a 59 year old female. She does not have any personal history of cancer. Joan has a history of simple endometrial hyperplasia without atypia. In her hormonal-based medical history, she had her first menstrual period at age 16, does not have biological children, and underwent menopause at age 56. Joan has her ovaries, fallopian tubes and uterus in place, and reports no ovarian cancer screening to date. She reports no history of hormone replacement therapy. Joan reports oral contraceptive use for approximately 30 years.       Joan has regular clinical breast exams and mammograms; her most recent mammogram in March 2024 was normal. Her first colonoscopy in January 2018 found one 2 mm hyperplastic polyp in the rectum. Follow-up was recommended in 5 years. Joan reports a history of dermatological exams. She does not regularly do any other cancer screening at this time. Joan reported no history of nicotine or tobacco use and occasional alcohol use.    Family History: (Please see scanned pedigree for detailed family history information)  Joan's mother was diagnosed with breast cancer at age 71. Treatment included a lumpectomy and radiation therapy. She is currently 79.   Maternal aunt was diagnosed with breast cancer over 10 years ago. She was recently diagnosed with cancer; it is unknown if her diagnosis  is a recurrence or a new primary cancer. She is currently 75.  Maternal grandmother was diagnosed with colon cancer in her late 50's/early 60's with recurrence in her mid/late 60's. She passed away at age 69.   Paternal uncle was recently diagnosed with prostate cancer in his late 60's.   Another paternal uncle was diagnosed with skin cancer.   Paternal first cousin was diagnosed with skin cancer.   Her maternal ethnicity is Nigerian , [place], and [place]ian. Her paternal ethnicity is [place]ian. There is no known Ashkenazi Zoroastrian ancestry on either side of her family. There is no reported consanguinity.    Discussion:  Based upon Joan's current personal and family history, Joan is likely at a lower risk for a hereditary cancer syndrome.   We reviewed the features of sporadic, familial, and hereditary cancers and discussed the features commonly associated with hereditary cancer syndromes. The vast majority of cancers are considered sporadic and not primarily due to an inherited factor. Individuals can develop cancer due to aging, chance events, environmental exposures or lifestyles. A handout regarding this information will be sent to Joan and can be found in the after visit summary.  As discussed in our session, her family history is absent for the following features typically seen in high risk families:   Cancers diagnosed at a young age (often before age 50)  Individuals with more than one primary cancer  Certain rare cancers/tumors  (i.e., male breast cancer)  Multiple relatives with similar cancers on the same side of the family  Cancers in multiple generations  Because of the absence of these features, it is unlikely that there is a currently identifiable hereditary cancer syndrome present in Joan's family. We discussed that insurance coverage for genetic testing is dependent upon personal and family history being suggestive of a hereditary cancer syndrome.  After our discussion, Joan was not  interested in pursuing genetic testing at this time.  We discussed the importance of Joan contacting me if her personal or family history changes. This may modify our assessment regarding risk for a hereditary cancer syndrome and/or genetic testing options.   Screening recommendations based upon personal/family history:  Based on her personal and family history, Joan has a 19.6% lifetime risk of developing breast cancer based on the YARED model. Therefore, Joan does not meet current National Comprehensive Cancer Network (NCCN) guidelines for high risk breast screening, which is offered to women with a 20% lifetime risk or higher. As Joan was close to the 20% threshold, we did discuss high risk breast cancer screening as an option. However, she declined. It is still important for Joan to continue with routine breast screening under the care of her physicians. Breast cancer screening is generally recommended to begin approximately 10 years younger than the earliest age of breast cancer diagnosis in the family, or at age 40, whichever comes first. In this family, screening may begin at age 40. Joan is encouraged to discuss breast screening with her physicians.  Other population cancer screening options, such as those recommended by the American Cancer Society and the National Comprehensive Cancer Network (NCCN), are also appropriate for Joan and her family. These screening recommendations may change if there are changes to Joan's personal and/or family history of cancer.   Final screening recommendations should be made by each individual's managing physician.  Of note, these screening recommendations may change should Joan elect to pursue genetic testing in the future.    Plan:  1) Joan elected not to have genetic testing at this time. Therefore, no genetic testing was ordered today.   2) Information regarding recommended screening, based upon the family history, was reviewed for  Joan.  3) Joan will contact me regularly and/or if the family or personal history changes. My contact information was provided    Joan is 59 year old and is being evaluated via a billable video visit.    Time spent on video: 43 minutes    Nohemi Toro MS, [facility] [place]  Licensed, Certified Genetic Counselor  Phone: [phone]

## 2024-05-22 ENCOUNTER — VIRTUAL VISIT (OUTPATIENT)
Dept: ONCOLOGY | Facility: CLINIC | Age: 59
End: 2024-05-22
Attending: FAMILY MEDICINE
Payer: COMMERCIAL

## 2024-05-22 DIAGNOSIS — Z80.8 FAMILY HISTORY OF SKIN CANCER: ICD-10-CM

## 2024-05-22 DIAGNOSIS — Z80.0 FAMILY HISTORY OF COLON CANCER: ICD-10-CM

## 2024-05-22 DIAGNOSIS — Z80.42 FAMILY HISTORY OF PROSTATE CANCER: ICD-10-CM

## 2024-05-22 DIAGNOSIS — Z80.3 FAMILY HISTORY OF BREAST CANCER: Primary | ICD-10-CM

## 2024-05-22 PROCEDURE — 96040 HC GENETIC COUNSELING, EACH 30 MINUTES: CPT | Mod: GT,95

## 2024-05-22 NOTE — PATIENT INSTRUCTIONS
Assessing Cancer Risk  Cancer is a common diagnosis which impacts many families. Individuals may develop cancer due to environmental factors (such as exposures and lifestyle), aging, genetic predisposition, or a combination of these factors. The vast majority of cancer diagnoses are considered sporadic, and not primarily due to an inherited risk factor. Approximately 5-10% of cancer diagnoses are thought to be caused by inherited risk factors.       There are several features that are likely to be present in a family that has an inherited alteration in a cancer susceptibility gene. However, this may not be the case for all families that carry a cancer risk gene, such as those with small family size or limited history information.  Cancers diagnosed at a young age (often before age 50)  Individuals with more than one primary cancer  Certain rare tumors/cancers  Multiple generations of the family affected with cancer    Categories of Cancer        Cancers may be:  Sporadic: somatic (acquired) genetic errors, environmental exposures, and lifestyle contribute to cancer as we age.  Familial: clustering of cancer in a family due to a combination of multiple genetic and shared environmental factors.  Hereditary: inherited risk for cancer, typically in a single gene.      Cancer Screening and Management  Cancer risk, as well as screening and management recommendations, are based on a combination of factors. This includes both personal and family history factors. Population cancer screening options, such as those recommended by the American Cancer Society and the National Comprehensive Cancer Network (NCCN), are appropriate for many families at average risk for cancer. However, earlier and/or more frequent screening may be recommended based on personal factors (lifestyle, exposures, medications, screening results), family history of cancer, and sometimes genetic factors. These cancer risk management options should be  discussed in more detail with an individual's medical providers.    Resources  National Society of Genetic Counselors nsgc.org   American Cancer Society cancer.org     Please call us if you have any questions or concerns.   Cancer Risk Management Program (Appointments: [phone])  Ramana Bentley, MS, [facility] [place] [phone]  Nohemi Toro, MS, [facility] [place] [phone]  Angie Kingsley, MS, [facility] [place]  [phone]  Dayna Mendes, MS, [facility] [place]  [phone]  Shiloh Dowell, MS, [facility] [place]  [phone]  Felisha Ardon, MS, [facility] [place] [phone]  Hannah Carr, MS, [facility] [place] [phone]

## 2024-05-22 NOTE — Clinical Note
5/22/2024         RE: Joan Lam  [address] [facility] [address]        Dear Colleague,    Thank you for referring your patient, Joan Lam, to the [facility]. Please see a copy of my visit note below.    5/22/2024    Virtual Visit Details  Type of service:  Video Visit   Originating Location (pt. Location): Home  Distant Location (provider location):  Off-site  Platform used for Video Visit: [facility]    Referring Provider: Doreen Aguila MD     Presenting Information:   I met with Joan Lam today for her video genetic counseling visit through the Cancer Risk Management Program to discuss her family history of breast, colon, and prostate cancer. She is here today to review this history, cancer screening recommendations, and available genetic testing options.    Personal History:  Joan is a 59 year old female. She does not have any personal history of cancer. Joan has a history of simple endometrial hyperplasia without atypia. In her hormonal-based medical history, she had her first menstrual period at age 16, does not have biological children, and underwent menopause at age 56. Joan has her ovaries, fallopian tubes and uterus in place, and reports no ovarian cancer screening to date. She reports no history of hormone replacement therapy. Joan reports oral contraceptive use for approximately 30 years.       Joan has regular clinical breast exams and mammograms; her most recent mammogram in March 2024 was normal. Her first colonoscopy in January 2018 found one 2 mm hyperplastic polyp in the rectum. Follow-up was recommended in 5 years. Joan reports a history of dermatological exams. She does not regularly do any other cancer screening at this time. Joan reported no history of nicotine or tobacco use and occasional alcohol use.    Family History: (Please see scanned pedigree for detailed family history information)  Joan's  mother was diagnosed with breast cancer at age 71. Treatment included a lumpectomy and radiation therapy. She is currently 79.   Maternal aunt was diagnosed with breast cancer over 10 years ago. She was recently diagnosed with cancer; it is unknown if her diagnosis is a recurrence or a new primary cancer. She is currently 75.  Maternal grandmother was diagnosed with colon cancer in her late 50's/early 60's with recurrence in her mid/late 60's. She passed away at age 69.   Paternal uncle was recently diagnosed with prostate cancer in his late 60's.   Another paternal uncle was diagnosed with skin cancer.   Paternal first cousin was diagnosed with skin cancer.   Her maternal ethnicity is Burkinan , Polish, and Finnish. Her paternal ethnicity is Finnish. There is no known Ashkenazi Church ancestry on either side of her family. There is no reported consanguinity.    Discussion:  Based upon Joan's current personal and family history, Joan is likely at a lower risk for a hereditary cancer syndrome.   We reviewed the features of sporadic, familial, and hereditary cancers and discussed the features commonly associated with hereditary cancer syndromes. The vast majority of cancers are considered sporadic and not primarily due to an inherited factor. Individuals can develop cancer due to aging, chance events, environmental exposures or lifestyles. A handout regarding this information will be sent to Joan and can be found in the after visit summary.  As discussed in our session, her family history is absent for the following features typically seen in high risk families:   Cancers diagnosed at a young age (often before age 50)  Individuals with more than one primary cancer  Certain rare cancers/tumors  (i.e., male breast cancer)  Multiple relatives with similar cancers on the same side of the family  Cancers in multiple generations  Because of the absence of these features, it is unlikely that there is a  currently identifiable hereditary cancer syndrome present in Joan's family. We discussed that insurance coverage for genetic testing is dependent upon personal and family history being suggestive of a hereditary cancer syndrome.  After our discussion, Joan was not interested in pursuing genetic testing at this time.  We discussed the importance of Joan contacting me if her personal or family history changes. This may modify our assessment regarding risk for a hereditary cancer syndrome and/or genetic testing options.   Screening recommendations based upon personal/family history:  Based on her personal and family history, Joan has a 19.6% lifetime risk of developing breast cancer based on the YARED model. Therefore, Joan does not meet current National Comprehensive Cancer Network (NCCN) guidelines for high risk breast screening, which is offered to women with a 20% lifetime risk or higher. However, it is still important for Joan to continue with routine breast screening under the care of her physicians. Breast cancer screening is generally recommended to begin approximately 10 years younger than the earliest age of breast cancer diagnosis in the family, or at age 40, whichever comes first. In this family, screening may begin at age 40. Joan is encouraged to discuss breast screening with her physicians.  Other population cancer screening options, such as those recommended by the American Cancer Society and the National Comprehensive Cancer Network (NCCN), are also appropriate for Joan and her family. These screening recommendations may change if there are changes to Joan's personal and/or family history of cancer.   Final screening recommendations should be made by each individual's managing physician.  Of note, these screening recommendations may change should Joan elect to pursue genetic testing in the future.    Plan:  1) Joan elected not to have genetic testing at this  time. Therefore, no genetic testing was ordered today.   2) Information regarding recommended screening, based upon the family history, was reviewed for Joan.  3) Joan will contact me regularly and/or if the family or personal history changes. My contact information was provided    Joan is 59 year old and is being evaluated via a billable video visit.    Time spent on video: 43 minutes    Nohemi Toro MS, [facility] [place]  Licensed, Certified Genetic Counselor  Phone: [phone]      Again, thank you for allowing me to participate in the care of your patient.        Sincerely,        Nohemi Toro GC

## 2024-05-22 NOTE — NURSING NOTE
Is the patient currently in the state of [place]? YES    Visit mode:VIDEO    If the visit is dropped, the patient can be reconnected by: VIDEO VISIT: Text to cell phone:   Telephone Information:   Mobile [phone]       Will anyone else be joining the visit? No  (If patient encounters technical issues they should call [phone])    How would you like to obtain your AVS? MyChart    Are changes needed to the allergy or medication list? N/A    Are refills needed on medications prescribed by this physician? NO    Rooming Documentation: Assigned questionnaire(s) completed .    Reason for visit: Consult     KELVIN Armendariz

## 2024-05-22 NOTE — LETTER
Cancer Risk Management  Program Locations    [facility]  [facility]  [facility]  [facility]  [facility]  Mailing Address  Cancer Risk Management Program  [address]  [place] [address]    New patient appointments  [phone]    May 22, 2024    Joan Buckleyguanaco  [address] [facility] [address]    Dear Joan,    It was a pleasure talking with you via your virtual genetic counseling visit on 5/22/2024.  Below is a copy of the progress note from that recent visit through the Cancer Risk Management Program.  If you have any additional questions, please feel free to contact me.    Referring Provider: Doreen Aguila MD     Presenting Information:   I met with Joan Genaro today for her video genetic counseling visit through the Cancer Risk Management Program to discuss her family history of breast, colon, and prostate cancer. She is here today to review this history, cancer screening recommendations, and available genetic testing options.    Personal History:  Joan is a 59 year old female. She does not have any personal history of cancer. Joan has a history of simple endometrial hyperplasia without atypia. In her hormonal-based medical history, she had her first menstrual period at age 16, does not have biological children, and underwent menopause at age 56. Joan has her ovaries, fallopian tubes and uterus in place, and reports no ovarian cancer screening to date. She reports no history of hormone replacement therapy. Joan reports oral contraceptive use for approximately 30 years.       Joan has regular clinical breast exams and mammograms; her most recent mammogram in March 2024 was normal. Her first colonoscopy in January 2018 found one 2 mm hyperplastic polyp in the rectum. Follow-up was recommended in 5  years. Joan reports a history of dermatological exams. She does not regularly do any other cancer screening at this time. Joan reported no history of nicotine or tobacco use and occasional alcohol use.    Family History: (Please see scanned pedigree for detailed family history information)  Joan's mother was diagnosed with breast cancer at age 71. Treatment included a lumpectomy and radiation therapy. She is currently 79.   Maternal aunt was diagnosed with breast cancer over 10 years ago. She was recently diagnosed with cancer; it is unknown if her diagnosis is a recurrence or a new primary cancer. She is currently 75.  Maternal grandmother was diagnosed with colon cancer in her late 50's/early 60's with recurrence in her mid/late 60's. She passed away at age 69.   Paternal uncle was recently diagnosed with prostate cancer in his late 60's.   Another paternal uncle was diagnosed with skin cancer.   Paternal first cousin was diagnosed with skin cancer.   Her maternal ethnicity is [place]i , [place], and [place] citizen. Her paternal ethnicity is [place] citizen. There is no known Ashkenazi Yarsanism ancestry on either side of her family. There is no reported consanguinity.    Discussion:  Based upon Joan's current personal and family history, Joan is likely at a lower risk for a hereditary cancer syndrome.   We reviewed the features of sporadic, familial, and hereditary cancers and discussed the features commonly associated with hereditary cancer syndromes. The vast majority of cancers are considered sporadic and not primarily due to an inherited factor. Individuals can develop cancer due to aging, chance events, environmental exposures or lifestyles. A handout regarding this information will be sent to Joan and can be found in the after visit summary.  As discussed in our session, her family history is absent for the following features typically seen in high risk families:   Cancers diagnosed at a young age  (often before age 50)  Individuals with more than one primary cancer  Certain rare cancers/tumors  (i.e., male breast cancer)  Multiple relatives with similar cancers on the same side of the family  Cancers in multiple generations  Because of the absence of these features, it is unlikely that there is a currently identifiable hereditary cancer syndrome present in Joan's family. We discussed that insurance coverage for genetic testing is dependent upon personal and family history being suggestive of a hereditary cancer syndrome.  After our discussion, Joan was not interested in pursuing genetic testing at this time.  We discussed the importance of Joan contacting me if her personal or family history changes. This may modify our assessment regarding risk for a hereditary cancer syndrome and/or genetic testing options.   Screening recommendations based upon personal/family history:  Based on her personal and family history, Joan has a 19.6% lifetime risk of developing breast cancer based on the YARED model. Therefore, Joan does not meet current National Comprehensive Cancer Network (NCCN) guidelines for high risk breast screening, which is offered to women with a 20% lifetime risk or higher. However, it is still important for Joan to continue with routine breast screening under the care of her physicians. Breast cancer screening is generally recommended to begin approximately 10 years younger than the earliest age of breast cancer diagnosis in the family, or at age 40, whichever comes first. In this family, screening may begin at age 40. Joan is encouraged to discuss breast screening with her physicians.  Other population cancer screening options, such as those recommended by the American Cancer Society and the National Comprehensive Cancer Network (NCCN), are also appropriate for Joan and her family. These screening recommendations may change if there are changes to Joan's personal  and/or family history of cancer.   Final screening recommendations should be made by each individual's managing physician.  Of note, these screening recommendations may change should Joan elect to pursue genetic testing in the future.    Plan:  1) Joan elected not to have genetic testing at this time. Therefore, no genetic testing was ordered today.   2) Information regarding recommended screening, based upon the family history, was reviewed for Joan.  3) Joan will contact me regularly and/or if the family or personal history changes. My contact information was provided    Joan is 59 year old and is being evaluated via a billable video visit.    Time spent on video: 43 minutes    Nohemi Toro MS, [facility] [place]  Licensed, Certified Genetic Counselor  Phone: [phone]

## 2024-05-22 NOTE — PROGRESS NOTES
"Virtual Visit Details    Type of service:  Video Visit     Originating Location (pt. Location): {video visit patient location:314091::\"Home\"}  {PROVIDER LOCATION On-site should be selected for visits conducted from your clinic location or adjoining [facility] hospital, academic office, or other nearby [facility] building. Off-site should be selected for all other provider locations, including home:[id number]}  Distant Location (provider location):  {virtual location provider:159855}  Platform used for Video Visit: {Virtual Visit Platforms:617227::\"[employer]\"}    "

## 2024-05-23 ENCOUNTER — PATIENT OUTREACH (OUTPATIENT)
Dept: FAMILY MEDICINE | Facility: CLINIC | Age: 59
End: 2024-05-23
Payer: COMMERCIAL

## 2024-05-23 DIAGNOSIS — R87.810 CERVICAL HIGH RISK HPV (HUMAN PAPILLOMAVIRUS) TEST POSITIVE: Primary | ICD-10-CM

## 2024-05-23 LAB
BKR LAB AP GYN ADEQUACY: NORMAL
BKR LAB AP GYN INTERPRETATION: NORMAL
BKR LAB AP LMP: NORMAL
BKR LAB AP PREVIOUS ABNL DX: NORMAL
BKR LAB AP PREVIOUS ABNORMAL: NORMAL
PATH REPORT.COMMENTS IMP SPEC: NORMAL
PATH REPORT.COMMENTS IMP SPEC: NORMAL
PATH REPORT.RELEVANT HX SPEC: NORMAL

## 2024-06-18 ENCOUNTER — ALLIED HEALTH/NURSE VISIT (OUTPATIENT)
Dept: FAMILY MEDICINE | Facility: CLINIC | Age: 59
End: 2024-06-18
Payer: COMMERCIAL

## 2024-06-18 VITALS — DIASTOLIC BLOOD PRESSURE: 84 MMHG | HEART RATE: 81 BPM | SYSTOLIC BLOOD PRESSURE: 124 MMHG

## 2024-06-18 DIAGNOSIS — I10 ESSENTIAL HYPERTENSION WITH GOAL BLOOD PRESSURE LESS THAN 140/90: Primary | ICD-10-CM

## 2024-06-18 PROCEDURE — 99207 PR NO CHARGE NURSE ONLY: CPT

## 2024-06-18 NOTE — PROGRESS NOTES
Good BP control.    Hyperplastic polyp is benign (carries no increased risk for colon cancer) so follows routine screening regimen (Q10 years).  Her next screening colonoscopy is due in 2028.    Please let her know.    Doreen Aguila MD  [facility]

## 2024-06-18 NOTE — PROGRESS NOTES
Joan Lam is a 59 year old year old patient who comes in today for a Blood Pressure check because of ongoing blood pressure monitoring.    Vital Signs as taken by RN:  132/86  HR 81  124/84    *did not take her amlodipine this AM    Patient is taking medication as prescribed. Has reduced NSAID intake as directed by PCP. Since, 5/16/24 has taken no NSAIDs and only the occasional tylenol.    Patient is tolerating medications well.    Patient is not monitoring Blood Pressure at home.    Current complaints: none    Disposition:  patient to continue with the same medication. Knows PCP will send a [employer] message if any changes are recommended. She states is due for a repeat colonoscopy and wondering how to get orders. Pulled the below from 2018 outside colonoscopy. Although appears that patient may be ok for the 10-year repeat plan? There was no clinician clarification on 1/15/18 pathology results.           Tere Edwards RN  [facility]

## 2024-06-19 NOTE — PROGRESS NOTES
Called patient back and relayed information from PCP below    TIFFANY NazarioN RN  [facility]

## 2024-06-30 ENCOUNTER — MYC REFILL (OUTPATIENT)
Dept: FAMILY MEDICINE | Facility: CLINIC | Age: 59
End: 2024-06-30
Payer: COMMERCIAL

## 2024-06-30 DIAGNOSIS — I10 ESSENTIAL HYPERTENSION WITH GOAL BLOOD PRESSURE LESS THAN 140/90: ICD-10-CM

## 2024-07-01 RX ORDER — AMLODIPINE BESYLATE 5 MG/1
TABLET ORAL
Qty: 90 TABLET | Refills: 3 | OUTPATIENT
Start: 2024-07-01

## 2024-07-09 ENCOUNTER — OFFICE VISIT (OUTPATIENT)
Dept: FAMILY MEDICINE | Facility: CLINIC | Age: 59
End: 2024-07-09
Payer: COMMERCIAL

## 2024-07-09 ENCOUNTER — TELEPHONE (OUTPATIENT)
Dept: FAMILY MEDICINE | Facility: CLINIC | Age: 59
End: 2024-07-09

## 2024-07-09 VITALS
HEART RATE: 78 BPM | TEMPERATURE: 97 F | SYSTOLIC BLOOD PRESSURE: 120 MMHG | RESPIRATION RATE: 20 BRPM | WEIGHT: 183.7 LBS | DIASTOLIC BLOOD PRESSURE: 80 MMHG | BODY MASS INDEX: 31.36 KG/M2 | OXYGEN SATURATION: 96 % | HEIGHT: 64 IN

## 2024-07-09 DIAGNOSIS — N85.01 ENDOMETRIAL HYPERPLASIA, SIMPLE: ICD-10-CM

## 2024-07-09 DIAGNOSIS — N62 MACROMASTIA: ICD-10-CM

## 2024-07-09 DIAGNOSIS — Z01.818 PREOP GENERAL PHYSICAL EXAM: Primary | ICD-10-CM

## 2024-07-09 DIAGNOSIS — E83.52 HYPERCALCEMIA: ICD-10-CM

## 2024-07-09 DIAGNOSIS — I10 ESSENTIAL HYPERTENSION WITH GOAL BLOOD PRESSURE LESS THAN 140/90: ICD-10-CM

## 2024-07-09 LAB
ANION GAP SERPL CALCULATED.3IONS-SCNC: 9 MMOL/L (ref 7–15)
BUN SERPL-MCNC: 15 MG/DL (ref 6–20)
CALCIUM SERPL-MCNC: 10.6 MG/DL (ref 8.6–10)
CHLORIDE SERPL-SCNC: 108 MMOL/L (ref 98–107)
CREAT SERPL-MCNC: 1.15 MG/DL (ref 0.51–0.95)
DEPRECATED HCO3 PLAS-SCNC: 26 MMOL/L (ref 22–29)
EGFRCR SERPLBLD CKD-EPI 2021: 55 ML/MIN/1.73M2
GLUCOSE SERPL-MCNC: 107 MG/DL (ref 70–99)
HGB BLD-MCNC: 14.1 G/DL (ref 11.7–15.7)
POTASSIUM SERPL-SCNC: 4.3 MMOL/L (ref 3.4–5.3)
SODIUM SERPL-SCNC: 143 MMOL/L (ref 135–145)

## 2024-07-09 PROCEDURE — 85018 HEMOGLOBIN: CPT | Performed by: FAMILY MEDICINE

## 2024-07-09 PROCEDURE — 93000 ELECTROCARDIOGRAM COMPLETE: CPT | Performed by: FAMILY MEDICINE

## 2024-07-09 PROCEDURE — G2211 COMPLEX E/M VISIT ADD ON: HCPCS | Performed by: FAMILY MEDICINE

## 2024-07-09 PROCEDURE — 99214 OFFICE O/P EST MOD 30 MIN: CPT | Performed by: FAMILY MEDICINE

## 2024-07-09 PROCEDURE — 36415 COLL VENOUS BLD VENIPUNCTURE: CPT | Performed by: FAMILY MEDICINE

## 2024-07-09 PROCEDURE — 80048 BASIC METABOLIC PNL TOTAL CA: CPT | Performed by: FAMILY MEDICINE

## 2024-07-09 RX ORDER — PROGESTERONE 200 MG/1
200 CAPSULE ORAL DAILY
Qty: 90 CAPSULE | Refills: 3 | Status: SHIPPED | OUTPATIENT
Start: 2024-07-09

## 2024-07-09 RX ORDER — TOPIRAMATE 50 MG/1
TABLET, FILM COATED ORAL
COMMUNITY
Start: 2024-07-09

## 2024-07-09 ASSESSMENT — PAIN SCALES - GENERAL: PAINLEVEL: NO PAIN (0)

## 2024-07-09 NOTE — TELEPHONE ENCOUNTER
Not able to leave message. Patient need to come back for blood work that need to be done before surgery.     FRANKIE Turcios  [facility]

## 2024-07-09 NOTE — PROGRESS NOTES
Preoperative Evaluation  [address]  [address]  [place] [address]  Phone: [phone]  Fax: [phone]  Primary Provider: Doreen Aguila MD  Pre-op Performing Provider: Doreen Aguila MD  Jul 9, 2024 7/8/2024   Surgical Information   What procedure is being done? Mammoplasty reduction   Facility or Hospital where procedure/surgery will be performed: [facility] [address], [place], [address]   Who is doing the procedure / surgery? Dr. Vance Stinson   Date of surgery / procedure: 8/1/2024   Time of surgery / procedure: 11:00 a.m.   Where do you plan to recover after surgery? at home with family        Fax number for surgical facility: [phone]    Assessment & Plan     The proposed surgical procedure is considered INTERMEDIATE risk.    Preop general physical exam  Macromastia  Macromastia with back and shoulder pain  - Hemoglobin  - EKG 12-lead complete w/read - Clinics    Essential hypertension with goal blood pressure less than 140/90  stable/well controlled   - **Basic metabolic panel     Endometrial hyperplasia, simple  Stable - Renewed Rx  - progesterone (PROMETRIUM) 200 MG capsule  Dispense: 90 capsule; Refill: 3     - No identified additional risk factors other than previously addressed    Preoperative Medication Instructions  Antiplatelet or Anticoagulation Medication Instructions   - Patient is on no antiplatelet or anticoagulation medications.    Additional Medication Instructions  Take all scheduled medications on the day of surgery   - Herbal medications and vitamins: DO NOT TAKE 14 days prior to surgery.    Recommendation  Approval given to proceed with proposed procedure, without further diagnostic evaluation.    The longitudinal plan of care for the diagnosis(es)/condition(s) as documented were addressed during this visit. Due to the added complexity in care, I will continue to support  Joan in the subsequent management and with ongoing continuity of care.    Monica Franco is a 59 year old, presenting for the following:  Pre-Op Exam          7/9/2024     8:24 AM   Additional Questions   Roomed by Mariaelena   Accompanied by alone         7/9/2024     8:24 AM   Patient Reported Additional Medications   Patient reports taking the following new medications none     HPI related to upcoming procedure: desires breast reduction due to back pain        7/8/2024   Pre-Op Questionnaire   Have you ever had a heart attack or stroke? No   Have you ever had surgery on your heart or blood vessels, such as a stent placement, a coronary artery bypass, or surgery on an artery in your head, neck, heart, or legs? No   Do you have chest pain with activity? No   Do you have a history of heart failure? No   Do you currently have a cold, bronchitis or symptoms of other infection? No   Do you have a cough, shortness of breath, or wheezing? No   Do you or anyone in your family have previous history of blood clots? No   Do you or does anyone in your family have a serious bleeding problem such as prolonged bleeding following surgeries or cuts? No   Have you ever had problems with anemia or been told to take iron pills? No   Have you had any abnormal blood loss such as black, tarry or bloody stools, or abnormal vaginal bleeding? No   Have you ever had a blood transfusion? No   Are you willing to have a blood transfusion if it is medically needed before, during, or after your surgery? Yes   Have you or any of your relatives ever had problems with anesthesia? No    Do you have sleep apnea, excessive snoring or daytime drowsiness? No   Do you have any artifical heart valves or other implanted medical devices like a pacemaker, defibrillator, or continuous glucose monitor? No   Do you have artificial joints? No   Are you allergic to latex? No        Health Care Directive  Patient does not have a Health Care Directive or  Living Will: Discussed advance care planning with patient; information given to patient to review.    Preoperative Review of    reviewed - controlled substances reflected in medication list.      Status of Chronic Conditions:  See problem list for active medical problems.  Problems all longstanding and stable, except as noted/documented.  See ROS for pertinent symptoms related to these conditions.    Patient Active Problem List   Diagnosis    Allergic rhinitis    Mild recurrent major depression (H24)    Eczema of eyelid    NATALIE (generalized anxiety disorder)    Essential hypertension with goal blood pressure less than 140/90    Cervical high risk HPV (human papillomavirus) test positive    BMI 34.0-34.9,adult    Acquired hypothyroidism    Endometrial hyperplasia, simple    Prolactinoma (H)        Past Medical History:   Diagnosis Date    Allergic rhinitis 08/11/2005    Anxiety disorder     Has Panic Attacks    Cervical high risk HPV (human papillomavirus) test positive 01/23/2019 01/23/2019, 02/05/20, 03/3/21    Depressive disorder 1992    History of prolactinoma 2005    Dr. Fischer, [facility]    Hypertension 12/2016    Hypothyroidism 2014     Past Surgical History:   Procedure Laterality Date    BIOPSY  mid-90's    from cervix after abnormal Pap. Benign.    BUNIONECTOMY RT/LT  2005    Right Foot    BUNIONECTOMY RT/LT  2006    Left Foot     COLONOSCOPY  January 15, 2018    1 polyp - benign. Next exam in 10 years.     Current Outpatient Medications   Medication Sig Dispense Refill    amLODIPine (NORVASC) 5 MG tablet TAKE 1 TABLET BY MOUTH DAILY 90 tablet 3    buPROPion (WELLBUTRIN XL) 300 MG 24 hr tablet Take 1 tablet (300 mg) by mouth every morning      cetirizine (ZYRTEC) 10 MG tablet Take 10 mg by mouth daily      desonide (DESOWEN) 0.05 % external ointment Apply topically 2 times daily as needed (to affected area) up to one week at a time. 15 g 1    FLUoxetine (PROZAC) 10 MG capsule Take 3  "capsules (30 mg) by mouth daily 270 capsule 1    fluticasone (FLONASE) 50 MCG/ACT nasal spray Spray 2 sprays into both nostrils daily      levothyroxine (SYNTHROID/LEVOTHROID) 100 MCG tablet Take 1 tablet (100 mcg) by mouth daily 90 tablet 1    LORazepam (ATIVAN) 0.5 MG tablet TAKE 1 TABLET(0.5 MG) BY MOUTH DAILY AS NEEDED FOR ANXIETY 30 tablet 1    progesterone (PROMETRIUM) 200 MG capsule Take 1 capsule (200 mg) by mouth daily 90 capsule 3    topiramate (TOPAMAX) 50 MG tablet Take one tablet (50 mg) by mouth in the morning and two tablets (100 mg) in the evening      VALERIAN ROOT PO Take 1,000 mg by mouth         Allergies   Allergen Reactions    No Known Drug Allergy         Social History     Tobacco Use    Smoking status: Never    Smokeless tobacco: Never   Substance Use Topics    Alcohol use: Yes     Comment: Less than 4 servings (drinks) per month       History   Drug Use No             Review of Systems  Constitutional, HEENT, cardiovascular, pulmonary, GI, , musculoskeletal, neuro, skin, endocrine and psych systems are negative, except as otherwise noted.    Objective    /80 (BP Location: Right arm, Patient Position: Sitting, Cuff Size: Adult Regular)   Pulse 78   Temp 97  F (36.1  C) (Temporal)   Resp 20   Ht 1.635 m (5' 4.37\")   Wt 83.3 kg (183 lb 11.2 oz)   LMP 02/21/2021 (LMP Unknown)   SpO2 96%   BMI 31.17 kg/m     Estimated body mass index is 31.17 kg/m  as calculated from the following:    Height as of this encounter: 1.635 m (5' 4.37\").    Weight as of this encounter: 83.3 kg (183 lb 11.2 oz).  Physical Exam  GENERAL: healthy, alert and no distress  EYES: Eyes grossly normal to inspection, conjunctivae and sclerae normal  HENT: ear canals and TM's normal, oropharynx clear with normal landmarks   NECK: no adenopathy, no asymmetry, masses, or scars and thyroid normal to palpation  RESP: lungs clear to auscultation - no rales, rhonchi or wheezes  CV: regular rate and rhythm, normal S1 " S2, no S3 or S4, no murmur, click or rub  ABDOMEN: soft, nontender, no hepatosplenomegaly, no masses  MS: no gross musculoskeletal defects noted, no edema  SKIN: no suspicious lesions or rashes  NEURO: Grossly normal strength and tone, mentation intact and speech normal  PSYCH: mentation appears normal, affect normal/bright    Recent Labs   Lab Test 05/16/24  1528 11/21/23  1121    142   POTASSIUM 4.3 4.3   CR 1.08* 1.02*         Diagnostics  Recent Results (from the past 168 hour(s))   **Basic metabolic panel FUTURE 2mo    Collection Time: 07/09/24 10:10 AM   Result Value Ref Range    Sodium 143 135 - 145 mmol/L    Potassium 4.3 3.4 - 5.3 mmol/L    Chloride 108 (H) 98 - 107 mmol/L    Carbon Dioxide (CO2) 26 22 - 29 mmol/L    Anion Gap 9 7 - 15 mmol/L    Urea Nitrogen 15.0 6.0 - 20.0 mg/dL    Creatinine 1.15 (H) 0.51 - 0.95 mg/dL    GFR Estimate 55 (L) >60 mL/min/1.73m2    Calcium 10.6 (H) 8.6 - 10.0 mg/dL    Glucose 107 (H) 70 - 99 mg/dL   Hemoglobin    Collection Time: 07/09/24 10:10 AM   Result Value Ref Range    Hemoglobin 14.1 11.7 - 15.7 g/dL      EKG: appears normal, NSR, normal axis, normal intervals, no acute ST/T changes c/w ischemia, no LVH by voltage criteria     Revised Cardiac Risk Index (RCRI)  The patient has the following serious cardiovascular risks for perioperative complications:   - No serious cardiac risks = 0 points     RCRI Interpretation: 0 points: Class I (very low risk - 0.4% complication rate)         Signed Electronically by: Doreen Aguila MD  Copy of this evaluation report is provided to requesting physician.

## 2024-07-09 NOTE — PATIENT INSTRUCTIONS

## 2024-07-10 NOTE — RESULT ENCOUNTER NOTE
Twan Franco,  Your basic metabolic panel results are just a little off from your usual - with a mildly elevated calcium and creatinine.  I'd like to recheck these along with a few other labs (parathyroid level and Vitamin D).      This is not urgent and you are fine to proceed with surgery, but please schedule another lab-only appointment some time in the next couple of weeks.    Doreen Aguila MD

## 2024-07-23 ENCOUNTER — LAB (OUTPATIENT)
Dept: LAB | Facility: CLINIC | Age: 59
End: 2024-07-23
Payer: COMMERCIAL

## 2024-07-23 DIAGNOSIS — E03.9 HYPOTHYROIDISM, UNSPECIFIED TYPE: ICD-10-CM

## 2024-07-23 DIAGNOSIS — E83.52 HYPERCALCEMIA: ICD-10-CM

## 2024-07-23 LAB
CREAT SERPL-MCNC: 1.06 MG/DL (ref 0.51–0.95)
EGFRCR SERPLBLD CKD-EPI 2021: 61 ML/MIN/1.73M2

## 2024-07-23 PROCEDURE — 36415 COLL VENOUS BLD VENIPUNCTURE: CPT

## 2024-07-23 PROCEDURE — 82565 ASSAY OF CREATININE: CPT

## 2024-07-23 RX ORDER — LEVOTHYROXINE SODIUM 100 UG/1
100 TABLET ORAL DAILY
Qty: 90 TABLET | Refills: 3 | Status: SHIPPED | OUTPATIENT
Start: 2024-07-23

## 2024-07-24 NOTE — RESULT ENCOUNTER NOTE
Twan Franco,  Your creatinine is back to your baseline.  However, the lab stated that you asked them to not run all of the orders I had placed.  I'm not sure why you asked for only the creatinine, but I do still recommend that we do the hypercalcemia work-up.  You will need to return for another lab draw to have the rest of the tests done.  Please schedule that at your earliest convenience.    Please refer to my last result note where I explained that your calcium level was elevated and I wanted to do additional labs for the hypercalcemia (repeat calcium, ionized calcium, parathyroid hormone and Vitamin D levels).      Doreen Aguila MD

## 2024-07-29 ENCOUNTER — LAB (OUTPATIENT)
Dept: LAB | Facility: CLINIC | Age: 59
End: 2024-07-29
Payer: COMMERCIAL

## 2024-07-29 DIAGNOSIS — E83.52 HYPERCALCEMIA: ICD-10-CM

## 2024-07-29 LAB
CA-I BLD-MCNC: 5.1 MG/DL (ref 4.4–5.2)
CALCIUM SERPL-MCNC: 9.7 MG/DL (ref 8.8–10.4)
PTH-INTACT SERPL-MCNC: 16 PG/ML (ref 15–65)
VIT D+METAB SERPL-MCNC: 36 NG/ML (ref 20–50)

## 2024-07-29 PROCEDURE — 36415 COLL VENOUS BLD VENIPUNCTURE: CPT

## 2024-07-29 PROCEDURE — 82310 ASSAY OF CALCIUM: CPT

## 2024-07-29 PROCEDURE — 83970 ASSAY OF PARATHORMONE: CPT

## 2024-07-29 PROCEDURE — 82330 ASSAY OF CALCIUM: CPT

## 2024-07-29 PROCEDURE — 82306 VITAMIN D 25 HYDROXY: CPT

## 2024-07-31 NOTE — RESULT ENCOUNTER NOTE
Twan Franco,  Thank you for coming back.  It is a relief to see all these normal results - that your calcium level has dropped back into the normal range, and your ionized calcium, parathyroid hormone (PTH), and Vitamin D levels are all normal.  Kurtis, much ado about nothing.    Doreen Aguila MD

## 2024-08-01 PROCEDURE — 88305 TISSUE EXAM BY PATHOLOGIST: CPT | Mod: TC,ORL | Performed by: PLASTIC SURGERY

## 2024-08-02 ENCOUNTER — LAB REQUISITION (OUTPATIENT)
Dept: LAB | Facility: CLINIC | Age: 59
End: 2024-08-02
Payer: COMMERCIAL

## 2024-08-05 PROCEDURE — 88305 TISSUE EXAM BY PATHOLOGIST: CPT | Mod: 26 | Performed by: PATHOLOGY

## 2024-08-22 ENCOUNTER — OFFICE VISIT (OUTPATIENT)
Dept: OBGYN | Facility: CLINIC | Age: 59
End: 2024-08-22
Payer: COMMERCIAL

## 2024-08-22 VITALS
HEART RATE: 79 BPM | DIASTOLIC BLOOD PRESSURE: 85 MMHG | WEIGHT: 183 LBS | OXYGEN SATURATION: 98 % | SYSTOLIC BLOOD PRESSURE: 131 MMHG | HEIGHT: 64 IN | BODY MASS INDEX: 31.24 KG/M2

## 2024-08-22 DIAGNOSIS — B97.7 HIGH RISK HUMAN PAPILLOMA VIRUS (HPV) INFECTION OF CERVIX: Primary | ICD-10-CM

## 2024-08-22 DIAGNOSIS — N72 HIGH RISK HUMAN PAPILLOMA VIRUS (HPV) INFECTION OF CERVIX: Primary | ICD-10-CM

## 2024-08-22 DIAGNOSIS — N85.01 SIMPLE ENDOMETRIAL HYPERPLASIA WITHOUT ATYPIA: ICD-10-CM

## 2024-08-22 PROCEDURE — 57454 BX/CURETT OF CERVIX W/SCOPE: CPT | Performed by: OBSTETRICS & GYNECOLOGY

## 2024-08-22 PROCEDURE — 58110 BX DONE W/COLPOSCOPY ADD-ON: CPT | Performed by: OBSTETRICS & GYNECOLOGY

## 2024-08-22 PROCEDURE — 88305 TISSUE EXAM BY PATHOLOGIST: CPT | Performed by: PATHOLOGY

## 2024-08-22 NOTE — PROGRESS NOTES
"GYN CLINIC VISIT  2024  CC: colposcopy and endometrial biopsy    HPI:  Joan Lam is a 59 year old year old female  who presents for initial colposcopy, referred. Pap smear on 24 showed: normal and with high risk HPV present: other. The prior pap showed normal and with high risk HPV present: other.     Recently had a breast reduction  Tried papillex and AHCC but made her creatinine high so she stopped    19: NIL pap, + HR HPV (not 16 or 18) result, EMB neg for dysplasia. Plan cotest in 1 year.   20: NIL pap, + HR HPV (not 16 or 18) result. Plan [facility]uni.  3/11/20 colp bx and ECC - no dysplasia. Plan: Would advise Mirena IUD, will likely plan repeat endometrial biopsy at the time of placement.    03/3/21 EMB Neg, NIL Pap, + HR HPV (not 16 or 18) Plan 1 yr co-test.  22 NIL pap, + HR HPV, not 16/18. Plan [facility]uni bef 22 / pt notified  22 Zuni: Benign. Plan 1 yr co-test / notified  23 NIL pap, + HR HPV (not 16 or 18). Plan: cotest in 1 yr.   24 NIL pap, +HR HPV, not 16/18. Plan [facility] bef 24 /       Simple endometrial hyperplasia without atypia  Treated with prometrium  3/2021 - Multiple fragments of benign nonphasic endometrium, negative for hyperplasia, atypia and malignancy.   3/2020 - simple hyperplasia without atypia  2019 - negative for hyperplasia  2018 - - Simple endometrial hyperplasia; no evidence of atypia or malignancy.     Patient's last menstrual period was 2021 (lmp unknown).  UPT today is not done  Patient does not smoke  Type of contraception: none  Age at first sexual intercourse: 20  Number of sexual partners (lifetime): 6+  Past GYN history: HPV  Prior cervical/vaginal disease: none.  Prior cervical treatment: no treatment.    Vitals:    24 0802   BP: 131/85   Pulse: 79   SpO2: 98%   Weight: 83 kg (183 lb)   Height: 1.635 m (5' 4.37\")         PROCEDURE: colposcopy  Before the procedure, it was ensured that the patient was educated " regarding the nature of her findings to date, the implications, and what was to be done. She has been made aware of the role of HPV, the natural history of infection, ways to minimize her future risk, the effect of HPV on the cervix, and treatment options available should they be indicated. The details of the colposcopic procedure were reviewed. All questions were answered before proceeding, and informed consent was therefore obtained.    Speculum placed in vagina and excellent visualization of cervix acheived, cervix swabbed x 3 with acetic acid solution.      FINDINGS:  Cervix: acetowhitening noted at 5 o'clock  SCJ seen?: yes   ECC done?: Yes   Satisfactory examination?: yes  Representative biopsy taken at 5 o'clock with tischler forceps  ECC performed with curette followed by cytobrush  Silver nitrate and pressure held to achieve hemostasis  Sample was sent for pathology.   Patient tolerated procedure well.      PROCEDURE: Endometrial biopsy.   After informed consent was obtained, the cervix was cleansed with betadine x3.  The pipel was inserted easily and four quadrant sampling was done x2 passes. Uterus sounded to 6.5cm Scant tissue obtained. Sample was sent for pathology.   Patient tolerated procedure well.      ASSESSMENT: 59 year old  postmenopausal female with +HR HPV other and h/o simple endometrial hyperplasia without atypia here for colpo and endometrial biopsy    PLAN:  1. High risk human papilloma virus (HPV) infection of cervix    - Surgical Pathology Exam  - COLP CERVIX/UPPER VAGINA W BX CERVIX/ENDOCERV CURETT  - IA PELVIC EXAMINATION    2. Simple endometrial hyperplasia without atypia    - Surgical Pathology Exam  - ENDOMETRIAL BIOPSY W/O CERVICAL DILATION; Standing  - IA PELVIC EXAMINATION        Giana Deal MD  specimens labelled and sent to Pathology, will base further treatment on Pathology findings, treatment options discussed with patient, post biopsy instructions given to  patient, and call to discuss Pathology results if treatment needed. If no treatment plan to communicate results via [employer].    Giana Deal MD

## 2024-08-22 NOTE — PATIENT INSTRUCTIONS

## 2024-08-26 LAB
PATH REPORT.COMMENTS IMP SPEC: NORMAL
PATH REPORT.FINAL DX SPEC: NORMAL
PATH REPORT.GROSS SPEC: NORMAL
PATH REPORT.MICROSCOPIC SPEC OTHER STN: NORMAL
PATH REPORT.RELEVANT HX SPEC: NORMAL
PHOTO IMAGE: NORMAL

## 2024-08-29 ENCOUNTER — TELEPHONE (OUTPATIENT)
Dept: OBGYN | Facility: CLINIC | Age: 59
End: 2024-08-29
Payer: COMMERCIAL

## 2024-09-03 ENCOUNTER — PATIENT OUTREACH (OUTPATIENT)
Dept: OBGYN | Facility: CLINIC | Age: 59
End: 2024-09-03
Payer: COMMERCIAL

## 2024-09-03 DIAGNOSIS — R87.810 CERVICAL HIGH RISK HPV (HUMAN PAPILLOMAVIRUS) TEST POSITIVE: Primary | ICD-10-CM

## 2024-09-03 NOTE — TELEPHONE ENCOUNTER
8/22/24 Alexander: Bx/ECC-neg. EMB-scant. Plan: cotest in 1 year. Awaiting follow up plan from Dr Deal for EMB.  Plan for virtual visit.

## 2024-09-19 ENCOUNTER — VIRTUAL VISIT (OUTPATIENT)
Dept: OBGYN | Facility: CLINIC | Age: 59
End: 2024-09-19
Payer: COMMERCIAL

## 2024-09-19 DIAGNOSIS — N85.01 SIMPLE ENDOMETRIAL HYPERPLASIA WITHOUT ATYPIA: Primary | ICD-10-CM

## 2024-09-19 PROCEDURE — 99213 OFFICE O/P EST LOW 20 MIN: CPT | Mod: 95 | Performed by: OBSTETRICS & GYNECOLOGY

## 2024-09-19 NOTE — Clinical Note
Pls call Joan to schedule her ultrasound at [place]. She can have an in person or virtual appt with me for follow up Thanks Giana Deal MD

## 2024-09-19 NOTE — PROGRESS NOTES
Joan is a 59 year old who is being evaluated via a billable video visit.    How would you like to obtain your AVS? MyChart  If the video visit is dropped, the invitation should be resent by: Text to cell phone: [phone]  Will anyone else be joining your video visit? No        Assessment & Plan   59 year old postmenopausal female with h/o endometrial hyperplasia without atypia, recent biopsy with scant tissue. Recommend ultrasound to help decide if repeat sampling needed.    Simple endometrial hyperplasia without atypia  Recommend ultrasound to evaluate endometrium  Discussed if 3mm or less then no additional tissue need  Given h/o endometrial hyperplasia if lining 4mm or greater will recommend hysteroscopy D&C for thorough evaluation given scant tissue on recent EMB in clinic  - US Transvaginal Pelvic Non-OB; Future    Giana Deal MD       Subjective   Joan is a 59 year old, presenting for the following health issues:  Follow Up    HPI   Joan had an endometrial biopsy 8/22 at time of colposcopy due to h/o endometrial hyperplaia.  No bleeding  Taking prometrium 200mg PO daily  Biopsy results inconclusive -   C.  Endometrium, biopsy:  -Abundant endocervical mucus and scattered fragments of benign endocervical glandular epithelium.  -Rare fragments of unremarkable surface glandular epithelium, consistent with endometrial origin.  -No intact endometrial tissue present for evaluation (see comment).     Comment    The endometrial biopsy (part C) is insufficiently cellular for evaluation of the endometrium, especially given the prior history of simple endometrial hyperplasia without atypia.  Clinical correlation is recommended to determine the need for repeat biopsy.         Simple endometrial hyperplasia without atypia  Treated with prometrium  3/2021 - Multiple fragments of benign nonphasic endometrium, negative for hyperplasia, atypia and malignancy.   3/2020 - simple hyperplasia without  atypia  1/2019 - negative for hyperplasia  8/2018 - - Simple endometrial hyperplasia; no evidence of atypia or malignancy.           Objective           Vitals:  No vitals were obtained today due to virtual visit.    Physical Exam   GENERAL: alert and no distress  EYES: Eyes grossly normal to inspection.  No discharge or erythema, or obvious scleral/conjunctival abnormalities.  RESP: No audible wheeze, cough, or visible cyanosis.    SKIN: Visible skin clear. No significant rash, abnormal pigmentation or lesions.  NEURO: Cranial nerves grossly intact.  Mentation and speech appropriate for age.  PSYCH: Appropriate affect, tone, and pace of words          Video-Visit Details    Type of service:  Video Visit   Joined the call at 9/19/2024, 10:16:01 am.  Left the call at 9/19/2024, 10:24:31 am.  You were on the call for 8 minutes 29 seconds .    Originating Location (pt. Location): Home  Distant Location (provider location):  On-site  Platform used for Video Visit: Mary  Signed Electronically by: Giana Deal MD

## 2024-10-02 ENCOUNTER — VIRTUAL VISIT (OUTPATIENT)
Dept: OBGYN | Facility: CLINIC | Age: 59
End: 2024-10-02
Attending: OBSTETRICS & GYNECOLOGY
Payer: COMMERCIAL

## 2024-10-02 ENCOUNTER — ANCILLARY PROCEDURE (OUTPATIENT)
Dept: ULTRASOUND IMAGING | Facility: CLINIC | Age: 59
End: 2024-10-02
Attending: OBSTETRICS & GYNECOLOGY
Payer: COMMERCIAL

## 2024-10-02 DIAGNOSIS — N85.01 SIMPLE ENDOMETRIAL HYPERPLASIA WITHOUT ATYPIA: Primary | ICD-10-CM

## 2024-10-02 DIAGNOSIS — N85.01 SIMPLE ENDOMETRIAL HYPERPLASIA WITHOUT ATYPIA: ICD-10-CM

## 2024-10-02 PROCEDURE — 76830 TRANSVAGINAL US NON-OB: CPT | Performed by: OBSTETRICS & GYNECOLOGY

## 2024-10-02 PROCEDURE — 99213 OFFICE O/P EST LOW 20 MIN: CPT | Mod: 95 | Performed by: OBSTETRICS & GYNECOLOGY

## 2024-10-02 NOTE — PROGRESS NOTES
Virtual Visit Details    Type of service:  Video Visit   Joined the call at 10/2/2024, 3:56:28 pm.  Left the call at 10/2/2024, 4:01:16 pm.  You were on the call for 4 minutes 47 seconds .  Originating Location (pt. Location): Home  Distant Location (provider location):  On-site  Platform used for Video Visit: [facility]    HPI:  59 year old  with h/o simple endometrial hyperplasia () who has a phone visit to discuss recent ultrasound results.     She has a history of simple endometrial hyperplasia without atypia.   3/2021 - Multiple fragments of benign nonphasic endometrium, negative for hyperplasia, atypia and malignancy.   3/2020 - simple hyperplasia without atypia  2019 - negative for hyperplasia  2018 - - Simple endometrial hyperplasia; no evidence of atypia or malignancy.     She has been taking Prometrium 100mg daily.    Recent attempted endometrial biopsy:  C.  Endometrium, biopsy:  -Abundant endocervical mucus and scattered fragments of benign endocervical glandular epithelium.  -Rare fragments of unremarkable surface glandular epithelium, consistent with endometrial origin.  -No intact endometrial tissue present for evaluation    No bleeding  Has still been taking progesterone    Ultrasound 10/2/24  Gynecological Ultrasonography:   Uterus: anteflexed. Contour is smooth/regular.  Size: 5.8 x 3.0 x 2.6 cm  Endometrium: Thickness Total 1.4 - 2.5 mm  Findings: Small amount of fluid in endometrium  Right Ovary: 0.9 x 1.4 x 1.4 cm. Simple cyst 1.0 x 0.8 x 0.8 cm  Left Ovary: 1.9 x 1.6 x 1.6 cm. Simple cyst 1.7 x 1.4 x 1.2 cm  Cul de Sac Free Fluid: No free fluid  Technique: Transvaginal Imaging performed     Impression:   The uterus is normal sized. The endometrium is thin and measures 2.5mm at the most. There is a trace amt of fluid in endometrium.   Bilateral ovaries contain small simple cysts, no follow up indicated.       ASSESSMENT  59 year old  postmenopausal female with h/o simple  endometrial hyperplasia without atypia in the past. Has been on low dose progesterone, no bleeding, recent biopsy was scant, ultrasound shows endometrial stripe thin    PLAN:  1. Simple endometrial hyperplasia without atypia  No bleeding. Normal biopsy in 2021. Normal ultrasound now with thin stripe now which is reassurance.  No need for endometrial sampling  Can stop progesterone. Okay to have bleeding within 1 month of stopping progesterone.   Instructed her to contact me if any future bleeding after that      Giana Deal MD

## 2024-11-05 ENCOUNTER — MYC REFILL (OUTPATIENT)
Dept: FAMILY MEDICINE | Facility: CLINIC | Age: 59
End: 2024-11-05
Payer: COMMERCIAL

## 2024-11-05 DIAGNOSIS — F41.1 GAD (GENERALIZED ANXIETY DISORDER): ICD-10-CM

## 2024-11-05 RX ORDER — LORAZEPAM 0.5 MG/1
TABLET ORAL
Qty: 30 TABLET | Refills: 1 | Status: SHIPPED | OUTPATIENT
Start: 2024-11-05

## 2024-11-11 DIAGNOSIS — I10 ESSENTIAL HYPERTENSION WITH GOAL BLOOD PRESSURE LESS THAN 140/90: ICD-10-CM

## 2024-11-12 RX ORDER — AMLODIPINE BESYLATE 5 MG/1
TABLET ORAL
Qty: 90 TABLET | Refills: 3 | Status: SHIPPED | OUTPATIENT
Start: 2024-11-12

## 2024-12-09 NOTE — LETTER
Anesthesia Post-op Note    Patient: Tyler Diallo  Procedure(s) Performed: Left knee arthroscopy with medial meniscus repair (Left: Knee)  Anesthesia type: General    Vitals Value Taken Time   Temp 36.5 °C (97.7 °F) 12/09/24 1635   Pulse 66 12/09/24 1640   Resp 12 12/09/24 1640   SpO2 98 % 12/09/24 1640   /60 12/09/24 1640         Patient Location: PACU Phase 1  Post-op Vital Signs:stable  Level of Consciousness: awake, alert, participates in exam and oriented  Respiratory Status: spontaneous ventilation, unassisted and room air  Cardiovascular stable and blood pressure returned to baseline  Hydration: euvolemic  Pain Management: adequately controlled  Handoff: Handoff to receiving clinician was performed and questions were answered  Vomiting: none  Nausea: None  Airway Patency:patent  Post-op Assessment: no complications, patient tolerated procedure well, dentition, mouth, tongue, and oropharynx unchanged  and No Corneal Abrasion      There were no known notable events for this encounter.                       March 2, 2021      Joan Lam  [address] [facility] [address]        Dear ,    At [facility], your health and wellness are our primary concern. That is why we are following up on your most recent Colposcopy.    Please call [phone] to schedule an appointment for your recommended follow-up Pap smear and Human Papillomavirus (HPV) test and EMB (this cannot be scheduled through [facility]) at your earliest convenience.      If you have completed the appointment outside of the [facility] system, please have the records forwarded to our office. We will update your chart for your provider to review before your next annual wellness visit.     Thank you for choosing [facility]!      Sincerely,    Your [facility] Care Team

## 2025-02-26 ENCOUNTER — PATIENT OUTREACH (OUTPATIENT)
Dept: CARE COORDINATION | Facility: CLINIC | Age: 60
End: 2025-02-26
Payer: COMMERCIAL

## 2025-03-21 ENCOUNTER — MYC MEDICAL ADVICE (OUTPATIENT)
Dept: FAMILY MEDICINE | Facility: CLINIC | Age: 60
End: 2025-03-21
Payer: COMMERCIAL

## 2025-03-24 NOTE — TELEPHONE ENCOUNTER
Writer replied to patient via [employer]. Rx routed to clinician for review.    YELENA Kitchen BSN, PHN, AMB-BC (she/her)  [facility] RN

## 2025-03-31 ENCOUNTER — HOSPITAL ENCOUNTER (OUTPATIENT)
Dept: MAMMOGRAPHY | Facility: CLINIC | Age: 60
Discharge: HOME | End: 2025-03-31
Attending: FAMILY MEDICINE | Admitting: FAMILY MEDICINE
Payer: COMMERCIAL

## 2025-03-31 DIAGNOSIS — Z12.31 VISIT FOR SCREENING MAMMOGRAM: ICD-10-CM

## 2025-03-31 PROCEDURE — 77063 BREAST TOMOSYNTHESIS BI: CPT

## 2025-04-22 DIAGNOSIS — F33.42 MAJOR DEPRESSIVE DISORDER, RECURRENT EPISODE, IN FULL REMISSION: ICD-10-CM

## 2025-04-22 DIAGNOSIS — F41.1 GAD (GENERALIZED ANXIETY DISORDER): ICD-10-CM

## 2025-04-24 RX ORDER — FLUOXETINE 10 MG/1
30 CAPSULE ORAL DAILY
Qty: 90 CAPSULE | Refills: 0 | Status: SHIPPED | OUTPATIENT
Start: 2025-04-24

## 2025-06-04 DIAGNOSIS — F41.1 GAD (GENERALIZED ANXIETY DISORDER): ICD-10-CM

## 2025-06-04 DIAGNOSIS — F33.42 MAJOR DEPRESSIVE DISORDER, RECURRENT EPISODE, IN FULL REMISSION: ICD-10-CM

## 2025-06-04 RX ORDER — FLUOXETINE 10 MG/1
30 CAPSULE ORAL DAILY
Qty: 90 CAPSULE | Refills: 11 | OUTPATIENT
Start: 2025-06-04

## 2025-06-19 DIAGNOSIS — F33.42 MAJOR DEPRESSIVE DISORDER, RECURRENT EPISODE, IN FULL REMISSION: ICD-10-CM

## 2025-06-19 DIAGNOSIS — F41.1 GAD (GENERALIZED ANXIETY DISORDER): ICD-10-CM

## 2025-06-19 RX ORDER — FLUOXETINE 10 MG/1
30 CAPSULE ORAL DAILY
Qty: 270 CAPSULE | Refills: 0 | Status: SHIPPED | OUTPATIENT
Start: 2025-06-19

## 2025-07-27 SDOH — HEALTH STABILITY: PHYSICAL HEALTH: ON AVERAGE, HOW MANY MINUTES DO YOU ENGAGE IN EXERCISE AT THIS LEVEL?: 20 MIN

## 2025-07-27 SDOH — HEALTH STABILITY: PHYSICAL HEALTH: ON AVERAGE, HOW MANY DAYS PER WEEK DO YOU ENGAGE IN MODERATE TO STRENUOUS EXERCISE (LIKE A BRISK WALK)?: 4 DAYS

## 2025-07-27 ASSESSMENT — SOCIAL DETERMINANTS OF HEALTH (SDOH): HOW OFTEN DO YOU GET TOGETHER WITH FRIENDS OR RELATIVES?: ONCE A WEEK

## 2025-07-29 ENCOUNTER — OFFICE VISIT (OUTPATIENT)
Dept: FAMILY MEDICINE | Facility: CLINIC | Age: 60
End: 2025-07-29
Attending: FAMILY MEDICINE
Payer: COMMERCIAL

## 2025-07-29 VITALS
DIASTOLIC BLOOD PRESSURE: 88 MMHG | BODY MASS INDEX: 31.31 KG/M2 | RESPIRATION RATE: 14 BRPM | OXYGEN SATURATION: 98 % | HEIGHT: 64 IN | HEART RATE: 84 BPM | TEMPERATURE: 97.2 F | SYSTOLIC BLOOD PRESSURE: 132 MMHG | WEIGHT: 183.4 LBS

## 2025-07-29 DIAGNOSIS — E03.9 ACQUIRED HYPOTHYROIDISM: ICD-10-CM

## 2025-07-29 DIAGNOSIS — E66.811 CLASS 1 OBESITY DUE TO EXCESS CALORIES WITH SERIOUS COMORBIDITY AND BODY MASS INDEX (BMI) OF 31.0 TO 31.9 IN ADULT: ICD-10-CM

## 2025-07-29 DIAGNOSIS — E03.9 HYPOTHYROIDISM, UNSPECIFIED TYPE: ICD-10-CM

## 2025-07-29 DIAGNOSIS — R87.810 CERVICAL HIGH RISK HPV (HUMAN PAPILLOMAVIRUS) TEST POSITIVE: ICD-10-CM

## 2025-07-29 DIAGNOSIS — Z23 NEED FOR VACCINATION: ICD-10-CM

## 2025-07-29 DIAGNOSIS — E66.09 CLASS 1 OBESITY DUE TO EXCESS CALORIES WITH SERIOUS COMORBIDITY AND BODY MASS INDEX (BMI) OF 31.0 TO 31.9 IN ADULT: ICD-10-CM

## 2025-07-29 DIAGNOSIS — F41.1 GAD (GENERALIZED ANXIETY DISORDER): ICD-10-CM

## 2025-07-29 DIAGNOSIS — I10 ESSENTIAL HYPERTENSION WITH GOAL BLOOD PRESSURE LESS THAN 140/90: ICD-10-CM

## 2025-07-29 DIAGNOSIS — Z00.00 ROUTINE GENERAL MEDICAL EXAMINATION AT A HEALTH CARE FACILITY: Primary | ICD-10-CM

## 2025-07-29 DIAGNOSIS — D35.2 PROLACTINOMA (H): ICD-10-CM

## 2025-07-29 DIAGNOSIS — F33.42 MAJOR DEPRESSIVE DISORDER, RECURRENT EPISODE, IN FULL REMISSION: ICD-10-CM

## 2025-07-29 LAB
ANION GAP SERPL CALCULATED.3IONS-SCNC: 10 MMOL/L (ref 7–15)
BUN SERPL-MCNC: 12.3 MG/DL (ref 6–20)
CALCIUM SERPL-MCNC: 9.3 MG/DL (ref 8.8–10.4)
CHLORIDE SERPL-SCNC: 108 MMOL/L (ref 98–107)
CREAT SERPL-MCNC: 0.99 MG/DL (ref 0.51–0.95)
EGFRCR SERPLBLD CKD-EPI 2021: 66 ML/MIN/1.73M2
GLUCOSE SERPL-MCNC: 82 MG/DL (ref 70–99)
HCO3 SERPL-SCNC: 24 MMOL/L (ref 22–29)
POTASSIUM SERPL-SCNC: 4 MMOL/L (ref 3.4–5.3)
SODIUM SERPL-SCNC: 142 MMOL/L (ref 135–145)
TSH SERPL DL<=0.005 MIU/L-ACNC: 1.11 UIU/ML (ref 0.3–4.2)

## 2025-07-29 PROCEDURE — 84443 ASSAY THYROID STIM HORMONE: CPT | Performed by: FAMILY MEDICINE

## 2025-07-29 PROCEDURE — 3079F DIAST BP 80-89 MM HG: CPT | Performed by: FAMILY MEDICINE

## 2025-07-29 PROCEDURE — 90472 IMMUNIZATION ADMIN EACH ADD: CPT | Performed by: FAMILY MEDICINE

## 2025-07-29 PROCEDURE — 90636 HEP A/HEP B VACC ADULT IM: CPT | Performed by: FAMILY MEDICINE

## 2025-07-29 PROCEDURE — 1126F AMNT PAIN NOTED NONE PRSNT: CPT | Performed by: FAMILY MEDICINE

## 2025-07-29 PROCEDURE — 80048 BASIC METABOLIC PNL TOTAL CA: CPT | Performed by: FAMILY MEDICINE

## 2025-07-29 PROCEDURE — G2211 COMPLEX E/M VISIT ADD ON: HCPCS | Performed by: FAMILY MEDICINE

## 2025-07-29 PROCEDURE — 99396 PREV VISIT EST AGE 40-64: CPT | Mod: 25 | Performed by: FAMILY MEDICINE

## 2025-07-29 PROCEDURE — 90677 PCV20 VACCINE IM: CPT | Performed by: FAMILY MEDICINE

## 2025-07-29 PROCEDURE — 99214 OFFICE O/P EST MOD 30 MIN: CPT | Mod: 25 | Performed by: FAMILY MEDICINE

## 2025-07-29 PROCEDURE — 87624 HPV HI-RISK TYP POOLED RSLT: CPT | Performed by: FAMILY MEDICINE

## 2025-07-29 PROCEDURE — 36415 COLL VENOUS BLD VENIPUNCTURE: CPT | Performed by: FAMILY MEDICINE

## 2025-07-29 PROCEDURE — 90471 IMMUNIZATION ADMIN: CPT | Performed by: FAMILY MEDICINE

## 2025-07-29 PROCEDURE — 3075F SYST BP GE 130 - 139MM HG: CPT | Performed by: FAMILY MEDICINE

## 2025-07-29 PROCEDURE — 96127 BRIEF EMOTIONAL/BEHAV ASSMT: CPT | Performed by: FAMILY MEDICINE

## 2025-07-29 RX ORDER — AMLODIPINE BESYLATE 5 MG/1
TABLET ORAL
Qty: 90 TABLET | Refills: 3 | Status: SHIPPED | OUTPATIENT
Start: 2025-07-29

## 2025-07-29 RX ORDER — BUPROPION HYDROCHLORIDE 300 MG/1
300 TABLET ORAL EVERY MORNING
Qty: 90 TABLET | Refills: 3 | Status: SHIPPED | OUTPATIENT
Start: 2025-07-29 | End: 2025-07-29

## 2025-07-29 RX ORDER — AMLODIPINE BESYLATE 5 MG/1
TABLET ORAL
Qty: 90 TABLET | Refills: 3 | Status: SHIPPED | OUTPATIENT
Start: 2025-07-29 | End: 2025-07-29

## 2025-07-29 RX ORDER — LORAZEPAM 0.5 MG/1
TABLET ORAL
Qty: 30 TABLET | Refills: 1 | Status: SHIPPED | OUTPATIENT
Start: 2025-07-29 | End: 2025-07-29

## 2025-07-29 RX ORDER — TOPIRAMATE 50 MG/1
150 TABLET, FILM COATED ORAL AT BEDTIME
Qty: 270 TABLET | Refills: 3 | Status: SHIPPED | OUTPATIENT
Start: 2025-07-29

## 2025-07-29 RX ORDER — BUPROPION HYDROCHLORIDE 300 MG/1
300 TABLET ORAL EVERY MORNING
Qty: 90 TABLET | Refills: 3 | Status: SHIPPED | OUTPATIENT
Start: 2025-07-29

## 2025-07-29 RX ORDER — FLUOXETINE 10 MG/1
30 CAPSULE ORAL DAILY
Qty: 270 CAPSULE | Refills: 3 | Status: SHIPPED | OUTPATIENT
Start: 2025-07-29 | End: 2025-07-29

## 2025-07-29 RX ORDER — LORAZEPAM 0.5 MG/1
TABLET ORAL
Qty: 30 TABLET | Refills: 1 | Status: SHIPPED | OUTPATIENT
Start: 2025-07-29

## 2025-07-29 RX ORDER — TOPIRAMATE 50 MG/1
150 TABLET, FILM COATED ORAL AT BEDTIME
Qty: 270 TABLET | Refills: 3 | Status: SHIPPED | OUTPATIENT
Start: 2025-07-29 | End: 2025-07-29

## 2025-07-29 RX ORDER — FLUOXETINE 10 MG/1
30 CAPSULE ORAL DAILY
Qty: 270 CAPSULE | Refills: 3 | Status: SHIPPED | OUTPATIENT
Start: 2025-07-29

## 2025-07-29 ASSESSMENT — PAIN SCALES - GENERAL: PAINLEVEL_OUTOF10: NO PAIN (0)

## 2025-07-29 NOTE — PATIENT INSTRUCTIONS
"Patient Education   Preventive Care Advice   This is general advice we often give to help people stay healthy. Your care team may have specific advice just for you. Please talk to your care team about your own preventive care needs.  Lifestyle  Exercise at least 150 minutes each week (30 minutes a day, 5 days a week).  Do muscle strengthening activities 2 days a week. These help control your weight and prevent disease.  No smoking.  Wear sunscreen to prevent skin cancer.  Take time with family and friends.  Have your home tested for radon every 2 to 5 years. Radon is a colorless, odorless gas that can harm your lungs. To learn more, go to [email or web address].[email or web address] and search for \"Radon in Homes.\"  Keep guns unloaded and locked up in a safe place like a safe or gun vault, or, use a gun lock and hide the keys. Always lock away bullets separately. To learn more, visit [employer].[place].gov and search for \"safe gun storage.\"  Nutrition  Eat 5 or more servings of fruits and vegetables each day.  Try wheat bread, brown rice and whole grain pasta (instead of white bread, rice, and pasta).  Get enough calcium and vitamin D. Check the label on foods and aim for 100% of the RDA (recommended daily allowance).  Regular exams  Have a dental exam and cleaning every 6 months.  Older adults: Ask your care team how often to have memory testing.  See your health care team every year to talk about:  Any changes in your health.  Any medicines your care team has prescribed.  Preventive care, family planning, and ways to prevent chronic diseases.  Shots (vaccines)   HPV shots (up to age 26), if you've never had them before.  Hepatitis B shots (up to age 59), if you've never had them before.  COVID-19 shot: Get this shot when it's due.  Flu shot: Get a flu shot every year.  Tetanus shot: Get a tetanus shot every 10 years.  Pneumococcal, hepatitis A, and RSV shots: Ask your care team if you need these based on your risk.  Shingles shot (for age 50 and " up).  General health tests  Diabetes screening:  Starting at age 35, Get screened for diabetes at least every 3 years.  If you are younger than age 35, ask your care team if you should be screened for diabetes.  Cholesterol test: At age 39, start having a cholesterol test every 5 years, or more often if advised.  Bone density scan (DEXA): At age 50, ask your care team if you should have this scan for osteoporosis (brittle bones).  Hepatitis C: Get tested at least once in your life.  Abdominal aortic aneurysm screening: Talk to your doctor about having this screening if you:  Have ever smoked; and  Are biologically male; and  Are between the ages of 65 and 75.  STIs (sexually transmitted infections)  Before age 24: Ask your care team if you should be screened for STIs.  After age 24: Get screened for STIs if you're at risk. You are at risk for STIs (including HIV) if:  You are sexually active with more than one person.  You don't use condoms every time.  You or a partner was diagnosed with a sexually transmitted infection.  If you are at risk for HIV, ask about PrEP medicine to prevent HIV.  Get tested for HIV at least once in your life, whether you are at risk for HIV or not.  Cancer screening tests  Cervical cancer screening: If you have a cervix, begin getting regular cervical cancer screening tests at age 21. Most people who have regular screenings with normal results can stop after age 65. Talk about this with your provider.  Breast cancer scan (mammogram): If you've ever had breasts, begin having regular mammograms starting at age 40. This is a scan to check for breast cancer.  Colon cancer screening: It is important to start screening for colon cancer at age 45.  Have a colonoscopy test every 10 years (or more often if you're at risk) Or, ask your provider about stool tests like a FIT test every year or Cologuard test every 3 years.  To learn more about your testing options, visit:  www.[employer]/416543.pdf.  For help making a decision, visit: fabienne.maryam/wj59221.  Prostate cancer screening test: If you have a prostate and are age 55 to 69, ask your provider if you would benefit from a yearly prostate cancer screening test.  Lung cancer screening: If you are a current or former smoker age 50 to 80, ask your care team if ongoing lung cancer screenings are right for you.    For informational purposes only. Not to replace the advice of your health care provider. Copyright   2023 [facility]. All rights reserved. Clinically reviewed by the [facility] Transitions Program. [employer] [id number] - REV 6/25.

## 2025-07-29 NOTE — PROGRESS NOTES
"Preventive Care Visit  [facility]  Doreen Aguila MD, Family Medicine  Jul 29, 2025      Assessment & Plan     Routine general medical examination at a health care facility  Reviewed/updated Health Maintenance     Cervical high risk HPV (human papillomavirus) test positive  - HPV and Gynecologic Cytology Panel - Recommended Age 30 - 65 Years    Major depressive disorder, recurrent episode, in full remission  NATALIE (generalized anxiety disorder)  stable/well controlled - Continue current medication regimen.  She uses the lorazepam sparingly.  I checked the [place] Prescription Monitoring Program website which showed no concerning activity.     - LORazepam (ATIVAN) 0.5 MG tablet  Dispense: 30 tablet; Refill: 1  - FLUoxetine (PROZAC) 10 MG capsule  Dispense: 270 capsule; Refill: 3    Essential hypertension with goal blood pressure less than 140/90  stable/well controlled - Continue current medication regimen.   - BASIC METABOLIC PANEL  - amLODIPine (NORVASC) 5 MG tablet  Dispense: 90 tablet; Refill: 3      Class 1 obesity due to excess calories with serious comorbidity and body mass index (BMI) of 31.0 to 31.9 in adult  Successful weight loss and now maintaining weight   - buPROPion (WELLBUTRIN XL) 300 MG 24 hr tablet  Dispense: 90 tablet; Refill: 3  - topiramate (TOPAMAX) 50 MG tablet  Dispense: 270 tablet; Refill: 3    Acquired hypothyroidism  Continue replacement therapy with levothyroxine.  Awaiting TSH results to determine if dose change is indicated.   - TSH WITH FREE T4 REFLEX    Prolactinoma (H)  Endo had wanted 2-year follow-up which was due this past January.  I reminded her to schedule that.      Need for vaccination  - Pneumococcal 20 Valent Conjugate (PCV20)  - HEP A & B ([place])      BMI  Estimated body mass index is 31.09 kg/m  as calculated from the following:    Height as of this encounter: 1.636 m (5' 4.4\").    Weight as of this encounter: 83.2 kg (183 lb 6.4 oz).   Weight " Nocturia symptoms are stable. PSA will be monitored with annual labs.     Orders:    PSA DIAGNOSTIC; Future     management plan: Topamax and Wellbutrin    Counseling  Appropriate preventive services were addressed with this patient via screening, questionnaire, or discussion as appropriate for fall prevention, nutrition, physical activity, Tobacco-use cessation, social engagement, weight loss and cognition.  Checklist reviewing preventive services available has been given to the patient.  Reviewed patient's diet, addressing concerns and/or questions.   Reviewed preventive health counseling, as reflected in patient instructions    Follow-up    Follow-up Visit   Expected date:  Jul 29, 2026 (Approximate)      Follow Up Appointment Details:     Follow-up with whom?: PCP    Follow-Up for what?: Adult Preventive    How?: In Person               The longitudinal plan of care for the diagnosis(es)/condition(s) as documented were addressed during this visit. Due to the added complexity in care, I will continue to support Joan in the subsequent management and with ongoing continuity of care.    Doreen Aguila MD  [facility]   Joan is a 60 year old, presenting for the following:  Physical (Pt will like to discuss about her medications, and depending on her lab results she might have some questions about taking supplement. ) and Gyn Exam        7/29/2025     7:58 AM   Additional Questions   Roomed by [place]   Accompanied by Self          HPI     HTN - gets readings of 120-130/80 on home machine      Advance Care Planning    Patient states has Health Care Directive and will send to [employer].         7/27/2025   General Health   How would you rate your overall physical health? Good   Feel stress (tense, anxious, or unable to sleep) Only a little   (!) STRESS CONCERN      7/27/2025   Nutrition   Three or more servings of calcium each day? Yes   Diet: Low fat/cholesterol   How many servings of fruit and vegetables per day? (!) 2-3   How many sweetened beverages each day? 0-1          7/27/2025   Exercise   Days per week of moderate/strenous exercise 4 days   Average minutes spent exercising at this level 20 min         7/27/2025   Social Factors   Frequency of gathering with friends or relatives Once a week   Worry food won't last until get money to buy more No   Food not last or not have enough money for food? No   Do you have housing? (Housing is defined as stable permanent housing and does not include staying outside in a car, in a tent, in an abandoned building, in an overnight shelter, or couch-surfing.) Yes   Are you worried about losing your housing? No   Lack of transportation? No   Unable to get utilities (heat,electricity)? No         7/27/2025   Fall Risk   Fallen 2 or more times in the past year? No   Trouble with walking or balance? No          7/27/2025   Dental   Dentist two times every year? Yes       Today's PHQ-9 Score:       7/29/2025     7:45 AM   PHQ-9 SCORE   PHQ-9 Total Score MyChart 1 (Minimal depression)   PHQ-9 Total Score 1        Patient-reported         7/27/2025   Substance Use   Alcohol more than 3/day or more than 7/wk No   Do you use any other substances recreationally? (!) CANNABIS PRODUCTS     Social History     Tobacco Use    Smoking status: Never    Smokeless tobacco: Never   Vaping Use    Vaping status: Never Used   Substance Use Topics    Alcohol use: Yes     Comment: Less than 4 servings (drinks) per month    Drug use: No           3/31/2025   LAST FHS-7 RESULTS   1st degree relative breast or ovarian cancer Yes   Any relative bilateral breast cancer No   Any male have breast cancer No   Any ONE woman have BOTH breast AND ovarian cancer No   Any woman with breast cancer before 50yrs No   2 or more relatives with breast AND/OR ovarian cancer Yes   2 or more relatives with breast AND/OR bowel cancer Yes        Mammogram Screening - Mammogram every 1-2 years updated in Health Maintenance based on mutual decision making        7/27/2025   STI Screening    New sexual partner(s) since last STI/HIV test? No     History of abnormal Pap smear: YES - reflected in Problem List and Health Maintenance accordingly        Latest Ref Rng & Units 5/16/2024     3:17 PM 6/29/2023     8:56 AM 4/21/2022     8:51 AM   PAP / HPV   PAP  Negative for Intraepithelial Lesion or Malignancy (NILM)  Negative for Intraepithelial Lesion or Malignancy (NILM)  Negative for Intraepithelial Lesion or Malignancy (NILM)    HPV 16 DNA Negative Negative  Negative  Negative    HPV 18 DNA Negative Negative  Negative  Negative    Other HR HPV Negative Positive  Positive  Positive      ASCVD Risk   The 10-year ASCVD risk score (Brandon FALL, et al., 2019) is: 4.2%    Values used to calculate the score:      Age: 60 years      Sex: Female      Is Non- : No      Diabetic: No      Tobacco smoker: No      Systolic Blood Pressure: 132 mmHg      Is BP treated: Yes      HDL Cholesterol: 34 mg/dL      Total Cholesterol: 143 mg/dL         Reviewed and updated as needed this visit by Provider   Tobacco  Allergies  Meds  Problems  Med Hx  Surg Hx  Fam Hx            BP Readings from Last 3 Encounters:   07/29/25 132/88   08/22/24 131/85   07/09/24 120/80    Wt Readings from Last 3 Encounters:   07/29/25 83.2 kg (183 lb 6.4 oz)   08/22/24 83 kg (183 lb)   07/09/24 83.3 kg (183 lb 11.2 oz)         Wt Readings from Last 10 Encounters:   07/29/25 83.2 kg (183 lb 6.4 oz)   08/22/24 83 kg (183 lb)   07/09/24 83.3 kg (183 lb 11.2 oz)   05/16/24 85.7 kg (189 lb)   12/04/23 87.5 kg (193 lb)   11/21/23 85.7 kg (189 lb)   06/29/23 88.4 kg (194 lb 14.4 oz)   01/25/23 88 kg (194 lb)   09/13/22 93.5 kg (206 lb 2 oz)   06/09/22 94.3 kg (207 lb 14.4 oz)            Patient Active Problem List   Diagnosis    Allergic rhinitis    Mild recurrent major depression    Eczema of eyelid    NATALIE (generalized anxiety disorder)    Essential hypertension with goal blood pressure less than 140/90    Cervical  "high risk HPV (human papillomavirus) test positive    BMI 34.0-34.9,adult    Acquired hypothyroidism    Endometrial hyperplasia, simple    Prolactinoma (H)     Past Surgical History:   Procedure Laterality Date    BIOPSY  mid-90's    from cervix after abnormal Pap. Benign.    BUNIONECTOMY RT/LT  2005    Right Foot    BUNIONECTOMY RT/LT  2006    Left Foot     COLONOSCOPY  January 15, 2018    1 polyp - benign. Next exam in 10 years.       Social History     Tobacco Use    Smoking status: Never    Smokeless tobacco: Never   Substance Use Topics    Alcohol use: Yes     Comment: Less than 4 servings (drinks) per month     Family History   Problem Relation Age of Onset    Breast Cancer Mother         Cancer-free for 2 years    Obesity Mother     Hypertension Father     Cerebrovascular Disease Father         When he was 20 yrs    Obesity Father     Cerebrovascular Disease Maternal Grandmother     Colon Cancer Maternal Grandmother 67    Breast Cancer Maternal Grandmother     Substance Abuse Maternal Grandfather     Anxiety Disorder Paternal Grandmother     Depression Paternal Grandfather     Substance Abuse Brother     Obesity Brother     Breast Cancer Maternal Aunt 60        Mat. Aunt - cancer free now    Breast Cancer Other         Mat. Aunt - cancer free now    Prostate Cancer No family hx of                 Objective    Exam  /88 (BP Location: Right arm, Patient Position: Sitting, Cuff Size: Adult Regular)   Pulse 84   Temp 97.2  F (36.2  C) (Temporal)   Resp 14   Ht 1.636 m (5' 4.4\")   Wt 83.2 kg (183 lb 6.4 oz)   LMP 02/21/2021 (LMP Unknown)   SpO2 98%   Breastfeeding No   BMI 31.09 kg/m     Estimated body mass index is 31.09 kg/m  as calculated from the following:    Height as of this encounter: 1.636 m (5' 4.4\").    Weight as of this encounter: 83.2 kg (183 lb 6.4 oz).    Physical Exam  GENERAL: healthy, alert and no distress  EYES: Eyes grossly normal to inspection, conjunctivae and sclerae " normal  HENT: ear canals and TM's normal  NECK: no adenopathy, no asymmetry, masses, or scars and thyroid normal to palpation  RESP: lungs clear to auscultation - no rales, rhonchi or wheezes  CV: regular rate and rhythm, normal S1 S2, no S3 or S4, no murmur, click or rub  ABDOMEN: soft, nontender, no hepatosplenomegaly, no masses  :  vulva, vagina, and cervix are normal in appearance and pap smear was obtained   MS: no gross musculoskeletal defects noted, no edema  SKIN: no suspicious lesions or rashes  NEURO: Grossly normal strength and tone, mentation intact and speech normal  PSYCH: mentation appears normal, affect normal/bright        Signed Electronically by: Doreen Aguila MD    Answers submitted by the patient for this visit:  Patient Health Questionnaire (Submitted on 7/29/2025)  If you checked off any problems, how difficult have these problems made it for you to do your work, take care of things at home, or get along with other people?: Not difficult at all  PHQ9 TOTAL SCORE: 1

## 2025-07-30 LAB
HPV HR 12 DNA CVX QL NAA+PROBE: NEGATIVE
HPV16 DNA CVX QL NAA+PROBE: NEGATIVE
HPV18 DNA CVX QL NAA+PROBE: NEGATIVE
HUMAN PAPILLOMA VIRUS FINAL DIAGNOSIS: NORMAL

## 2025-07-30 RX ORDER — LEVOTHYROXINE SODIUM 100 UG/1
100 TABLET ORAL DAILY
Qty: 90 TABLET | Refills: 3 | Status: SHIPPED | OUTPATIENT
Start: 2025-07-30

## 2025-07-30 RX ORDER — LEVOTHYROXINE SODIUM 100 UG/1
100 TABLET ORAL DAILY
Qty: 90 TABLET | Refills: 3 | Status: SHIPPED | OUTPATIENT
Start: 2025-07-30 | End: 2025-07-30

## 2025-08-06 DIAGNOSIS — E66.811 CLASS 1 OBESITY DUE TO EXCESS CALORIES WITH SERIOUS COMORBIDITY AND BODY MASS INDEX (BMI) OF 31.0 TO 31.9 IN ADULT: ICD-10-CM

## 2025-08-06 DIAGNOSIS — E66.09 CLASS 1 OBESITY DUE TO EXCESS CALORIES WITH SERIOUS COMORBIDITY AND BODY MASS INDEX (BMI) OF 31.0 TO 31.9 IN ADULT: ICD-10-CM

## 2025-08-06 RX ORDER — TOPIRAMATE 50 MG/1
150 TABLET, FILM COATED ORAL AT BEDTIME
Qty: 270 TABLET | Refills: 3 | OUTPATIENT
Start: 2025-08-06